# Patient Record
Sex: FEMALE | Race: WHITE
[De-identification: names, ages, dates, MRNs, and addresses within clinical notes are randomized per-mention and may not be internally consistent; named-entity substitution may affect disease eponyms.]

---

## 2021-02-11 NOTE — HPE
HISTORY AND PHYSICAL



DATE OF ADMISSION:  2021



HISTORY OF PRESENT ILLNESS:  This lady is a 24-year-old  4, para 2, last

menstrual period (LMP) May 23, 2020, estimated date of confinement (EDC) 2021 by dating ultrasound at eight weeks, two days.  Her past history was a

previous  section times two.  Initial section was at 39 weeks because

of a breech presentation and the second was an elective repeat 

section.  She came in several days ago with acute itching. liver enzymes were 
performed

and she was given ursodiol for the severe itching and her  section was

moved up to 2021.  She received a Beta 12 mg 02/10/2021 and

received her second dose on 2021.



PAST MEDICAL HISTORY:

1.  2012 at 38 weeks, primary  section, breech presentation, 8

pounds 5 ounces; infant required hip braces for four months.

2.  2017 at 39 weeks, elective repeat  section, 7 pounds 11

ounces.

3.  Several years later, had a spontaneous  at 6 weeks, which was

confirmed.  



PHYSICAL EXAMINATION:   

The rest of the examination is unremarkable.  She is normocephalic, atraumatic.

 Neck full range of motion.  Pupils equal and reactive to light.  Distal pulses

are symmetric.  No evidence of deep venous thrombosis (DVT), pulmonary embolism

(PE) or superficial phlebitis.  

CHEST:  Clear bilaterally to bases.  No wheezes or rhonchi.  No CVAT. 

ABDOMEN:  Soft.  Four quadrant bowel sounds are noted.  

Symphysis fundus height is 36.5.  

Fetal heart is 145 and regular.  

Her weight today is 198.8 pounds.  She is 5 feet 6 inches.  Her initial body

mass index (BMI) at visit was 25.8.  

Blood pressure 131/65, respirations 18, pulse 72.  



LABORATORY DATA:

She is A positive. Human immunodeficiency virus negative.  Hepatitis negative. 

RPR negative.  Rubella immune.  Varicella nonimmune.  Pap was normal.  Urine

was mixed alfa.  Gonorrhea and chlamydia were negative.  Her one-hour glucose

was 113.  Group B Streptococcus (GBS) is pending.  



We reviewed the risks and benefits of  section including hemorrhage,

infection, perforation, death, reoperation, remote possibility of blood

transfusion, remote possibility of hysterectomy for life-threatening bleeding

issues, remote possibility of fetal laceration and/or baby in the 

intensive care unit (NICU) for respiratory difficulty, despite the fact she has

had two Beta shots already.  Patient expressed understanding of risks and

benefits, has significant itching today.



She had a nonstress test, which is reactive.  All questions were answered, 30

minute discussion.  Patient signed a consent form.  She is scheduled for COVID

testing.   her medications at [place].

[facility]

## 2021-02-12 ENCOUNTER — HOSPITAL ENCOUNTER (INPATIENT)
Dept: HOSPITAL 53 - M LDI | Age: 26
LOS: 2 days | Discharge: HOME | DRG: 771 | End: 2021-02-14
Attending: OBSTETRICS & GYNECOLOGY | Admitting: OBSTETRICS & GYNECOLOGY
Payer: COMMERCIAL

## 2021-02-12 VITALS — WEIGHT: 199.3 LBS | HEIGHT: 66 IN | BODY MASS INDEX: 32.03 KG/M2

## 2021-02-12 VITALS — SYSTOLIC BLOOD PRESSURE: 128 MMHG | DIASTOLIC BLOOD PRESSURE: 82 MMHG

## 2021-02-12 VITALS — DIASTOLIC BLOOD PRESSURE: 70 MMHG | SYSTOLIC BLOOD PRESSURE: 125 MMHG

## 2021-02-12 VITALS — DIASTOLIC BLOOD PRESSURE: 68 MMHG | SYSTOLIC BLOOD PRESSURE: 126 MMHG

## 2021-02-12 VITALS — DIASTOLIC BLOOD PRESSURE: 63 MMHG | SYSTOLIC BLOOD PRESSURE: 115 MMHG

## 2021-02-12 VITALS — DIASTOLIC BLOOD PRESSURE: 56 MMHG | SYSTOLIC BLOOD PRESSURE: 114 MMHG

## 2021-02-12 VITALS — SYSTOLIC BLOOD PRESSURE: 117 MMHG | DIASTOLIC BLOOD PRESSURE: 76 MMHG

## 2021-02-12 VITALS — DIASTOLIC BLOOD PRESSURE: 69 MMHG | SYSTOLIC BLOOD PRESSURE: 117 MMHG

## 2021-02-12 VITALS — DIASTOLIC BLOOD PRESSURE: 64 MMHG | SYSTOLIC BLOOD PRESSURE: 128 MMHG

## 2021-02-12 DIAGNOSIS — Z3A.36: ICD-10-CM

## 2021-02-12 DIAGNOSIS — D64.9: ICD-10-CM

## 2021-02-12 DIAGNOSIS — O34.211: ICD-10-CM

## 2021-02-12 DIAGNOSIS — K83.1: ICD-10-CM

## 2021-02-12 LAB
BASE EXCESS BLDCOA CALC-SCNC: -4.5 MMOL/L
BASE EXCESS BLDCOV CALC-SCNC: -3.3 MMOL/L
CO2 BLDCOA CALC-SCNC: 24.5 MEQ/L
CO2 BLDCOV CALC-SCNC: 23.9 MEQ/L
HCO3 STD BLDCOA-SCNC: 19.3 MEQ/L
HCO3 STD BLDCOA-SCNC: 22.9 MEQ/L
HCO3 STD BLDCOV-SCNC: 21 MEQ/L
HCO3 STD BLDCOV-SCNC: 22.5 MEQ/L
HCT VFR BLD AUTO: 32.1 % (ref 36–47)
HGB BLD-MCNC: 9.4 G/DL (ref 12–15.5)
MCH RBC QN AUTO: 21.8 PG (ref 27–33)
MCHC RBC AUTO-ENTMCNC: 29.3 G/DL (ref 32–36.5)
MCV RBC AUTO: 74.5 FL (ref 80–96)
PCO2 BLDCOA: 51.1 MMHG
PCO2 BLDCOV: 43.3 MMHG
PH BLDCOA: 7.27 UNITS
PH BLDCOV: 7.33 UNITS
PLATELET # BLD AUTO: 276 10^3/UL (ref 150–450)
PO2 BLDCOA: 17.8 MMHG
PO2 BLDCOV: 29.2 MMHG
RBC # BLD AUTO: 4.31 10^6/UL (ref 4–5.4)
SAO2 % BLDCOA: 35.9 %
SAO2 % BLDCOV: 69.1 %
WBC # BLD AUTO: 12.6 10^3/UL (ref 4–10)

## 2021-02-12 RX ADMIN — KETOROLAC TROMETHAMINE SCH MG: 30 INJECTION, SOLUTION INTRAMUSCULAR at 21:13

## 2021-02-12 RX ADMIN — KETOROLAC TROMETHAMINE SCH MG: 30 INJECTION, SOLUTION INTRAMUSCULAR at 15:14

## 2021-02-12 NOTE — RO
OPERATIVE NOTE



DATE OF OPERATION: 2021



This is a 25-year-old  4, para 2 admitted for repeat  section at

36 weeks and 6 days gestation. She has had two previous  sections and

she has cholestasis of pregnancy which is significant in that she has elevated

liver enzymes and bile salts.



PREOPERATIVE DIAGNOSIS: Repeat  section x3, cholestasis of pregnancy.



POSTOPERATIVE DIAGNOSIS: Repeat  section x3, cholestasis of pregnancy.



OPERATION PROPOSED: Repeat  section.



PROCEDURE PERFORMED: Repeat  section.



SURGEON: Wil Lindsay MD



ASSISTANT: Dr. Hsu for extraction, retraction and visualization without

which the procedure could not be completed.



ANESTHESIA: Spinal plus local anesthetic for intraperitoneal procedures.



ESTIMATED BLOOD LOSS: 400 mL. 



DESCRIPTION OF PROCEDURE: After adequate time out prepped and draped in supine

position, Davenport catheter in the bladder draining clear urine, Acetaminophen

suppository 1300 mg per rectum, sequentials in place, appropriate antibiotics

preoperatively, a Pfannenstiel incision was made through the two previous

Pfannenstiel incisions, passing through abdominal layers, securing hemostasis,

opening the peritoneal cavity. We noticed that this lady had a huge varicosity

on her left side and had a very thin lower uterine segment. A Mobius was placed

in the abdomen. The bladder flap was developed. Small transverse incision into

the uterus was made, ARM, draining clear lochia. We delivered a livebirth

female infant, 7 pounds 0 ounces, 3170 gm, Apgars of 8 and 9 at 1 and 5 minutes

respectively, cord x1 around the neck and around the ankle. Arterial pH 7.27,

base excess -4.5, venous pH 7.33, base excess -3.3. Placenta was manually

removed, three vessels of the cord, membranes and tissues intact. The uterus

contracted well down under Pitocin. The lower segment was oversewn in usual

fashion in two layers and reperitonealization was performed. With instrument

and pad count correct we viewed the left side. She still had the varicosities,

we did remove the Mobius and evaluated the left side, seemed to be stable. We

then closed the abdomen with running stitch for peritoneum, same for the

fascia, irrigation to subcu, subcuticular stitch was placed. Marcaine 0.25% 10

mL to the incision site. The Mepore dressing was placed. The patient was sent

to recovery in good condition.

## 2021-02-12 NOTE — CCD
Summarization Of Episode

                             Created on: 2021



MR ODILIA SANTIAGO

External Reference #: [id number]

: 1995

Sex: Female



Demographics





                          Address                   [address]  [address]

 

                          Home Phone                [phone]

 

                          Preferred Language        English

 

                          Marital Status            

 

                          Orthodoxy Affiliation     Mormon

 

                          Race                      White

 

                          Ethnic Group              Not  or 





Author





                          Author                    [facility]

 

                          Organization              [facility]

 

                          Address                   Unknown

 

                          Phone                     Unavailable







Support





                Name            Relationship    Address         Phone

 

                    OLNAKUL              Next Of Kin         [address], [place]  [address] [phone]

 

                    PAMELA  GERHARD    Next Of Kin         [address]  [address] [phone]

 

                UE              Next Of Kin     Unknown         Unavailable

 

                    PAMELA  ADRIAN      Next Of Kin         [address]  [address] [phone]







Care Team Providers





                    Care Team Member Name Role                Phone

 

                    Li,  Zhenbo PA      Unavailable         Unavailable

 

                    Li,  Zhenbo PA      Unavailable         Unavailable

 

                    Li,  Zhenbo PA      Unavailable         Unavailable

 

                    Li,  Zhenbo PA      Unavailable         Unavailable

 

                    Swatsworth, A Dwayne PA Unavailable         Unavailable

 

                    Swatsworth, A Dwayne PA Unavailable         Unavailable

 

                    Swatsworth, A Dwayne PA Unavailable         Unavailable

 

                    Swatsworth, A Dwayne PA Unavailable         Unavailable

 

                    Swatsworth, A Dwayne PA Unavailable         Unavailable

 

                    Swatsworth, A Dwayne PA Unavailable         Unavailable

 

                    Swatsworth, A Dwayne PA Unavailable         Unavailable

 

                    Swatsworth, A Dwayne PA Unavailable         Unavailable

 

                    Swatsworth, A Dwayne PA Unavailable         Unavailable

 

                    Swatsworth, A Dwayne PA Unavailable         Unavailable

 

                    Swatsworth, A Dwayne PA Unavailable         Unavailable

 

                    Swatsworth, A Dwayne PA Unavailable         Unavailable

 

                    Swatsworth, A Dwayne PA Unavailable         Unavailable

 

                    Swatsworth, A Dwayne PA Unavailable         Unavailable

 

                    WALESKA CRAWFORD MD Unavailable         Unavailable

 

                    WALESKA CRAWFORD MD Unavailable         Unavailable

 

                    WALESKA CRAWFORD MD Unavailable         Unavailable

 

                    WALESKA CRAWFORD MD Unavailable         Unavailable

 

                    WALESKA CRAWFORD MD Unavailable         Unavailable

 

                    WALESKA CRAWFORD MD Unavailable         Unavailable

 

                    WALESKA CRAWFORD MD Unavailable         Unavailable

 

                    WALESKA CRAWFORD MD Unavailable         Unavailable

 

                    WALESKA CRAWFORD MD Unavailable         Unavailable

 

                    WALESKA CRAWFORD MD Unavailable         Unavailable

 

                    WALESKA CRAWFORD MD Unavailable         Unavailable

 

                    WALESKA CRAWFORD MD Unavailable         Unavailable

 

                    TY F MICHAEL STAUFFER Unavailable         Unavailable

 

                    TYWALESKA MD Unavailable         Unavailable

 

                    TY F MICHAEL STAUFFER Unavailable         Unavailable

 

                    TY, F MICHAEL STAUFFER Unavailable         Unavailable

 

                    TY, F MICHAEL STAUFFER Unavailable         Unavailable

 

                    TY, F MICHAEL STAUFFER Unavailable         Unavailable

 

                    TY, F MICHAEL STAUFFER Unavailable         Unavailable

 

                    TY, F MICHAEL STAUFFER Unavailable         Unavailable

 

                    TY, F MICHAEL STAUFFER Unavailable         Unavailable

 

                    TY, F MICAHEL STAUFFER Unavailable         Unavailable

 

                    TY, F MICHAEL STAUFFER Unavailable         Unavailable

 

                    TY, F MICHAEL STAUFFER Unavailable         Unavailable

 

                    TY, F MICHAEL STAUFFER Unavailable         Unavailable

 

                    TY, F MICHAEL STAUFFER Unavailable         Unavailable

 

                    TY, F MICHAEL STAUFFER Unavailable         Unavailable

 

                    [facility], [facility] Unavailable         Unavailable

 

                    TURRIN,  AV   Unavailable         Unavailable

 

                    TURRIN,  AV   Unavailable         Unavailable

 

                    TURRIN,  [facility]   Unavailable         Unavailable

 

                    TURRIN,  AV   Unavailable         Unavailable

 

                    Ezequiel, J David PA-C Unavailable         Unavailable

 

                    Ezequiel, J David PA-C Unavailable         Unavailable

 

                    Ezequiel, J David PA-C Unavailable         Unavailable

 

                    Ezequiel, J David PA-C Unavailable         Unavailable

 

                    Ezequiel, J David PA-C Unavailable         Unavailable

 

                    Ezequiel, J David PA-C Unavailable         Unavailable

 

                    Ezequiel, J David PA-C Unavailable         Unavailable

 

                    Ezequiel, J David PA-C Unavailable         Unavailable

 

                    Ezequiel, J David PA-C Unavailable         Unavailable

 

                    Ezequiel, J David PA-C Unavailable         Unavailable

 

                    Ezequiel, J Advid PA-C Unavailable         Unavailable

 

                    MARQUES FAIR MD  Unavailable         Unavailable

 

                    MARQUES FAIR MD  Unavailable         Unavailable

 

                    MARQUES FAIR MD  Unavailable         Unavailable

 

                    MARQUES FAIR MD  Unavailable         Unavailable

 

                    MARQUES FAIR MD  Unavailable         Unavailable

 

                    MARQUES FAIR MD  Unavailable         Unavailable

 

                    MARQUES FAIR MD  Unavailable         Unavailable

 

                    MARQUES FAIR MD  Unavailable         Unavailable

 

                    MARQUES FAIR MD  Unavailable         Unavailable

 

                    MARQUES FAIR MD  Unavailable         Unavailable

 

                    MARQUES FAIR MD  Unavailable         Unavailable

 

                    MARQUES FAIR MD  Unavailable         Unavailable

 

                    MARQUES FAIR MD  Unavailable         Unavailable

 

                    MARQUES FAIR MD  Unavailable         Unavailable

 

                    MARQUES FAIR MD  Unavailable         Unavailable

 

                    MARQUES FAIR MD  Unavailable         Unavailable

 

                    MARQUES FAIR MD  Unavailable         Unavailable

 

                    MARQUES FAIR MD  Unavailable         Unavailable

 

                    MARQUES FAIR MD  Unavailable         Unavailable

 

                    MARQUES FAIR MD  Unavailable         Unavailable

 

                    MARQUES FAIR MD  Unavailable         Unavailable

 

                    MARQUES FAIR MD  Unavailable         Unavailable

 

                    MARQUES FAIR MD  Unavailable         Unavailable

 

                    VENERUS, POPPY DOVER MD Unavailable         Unavailable

 

                    VENERUS, POPPY DOVER MD Unavailable         Unavailable

 

                    VENERUS, POPPY DOVER MD Unavailable         Unavailable

 

                    VENERUS, POPPY DOVER MD Unavailable         Unavailable

 

                    VENERUS, POPPY DOVER MD Unavailable         Unavailable

 

                    VENERUS, POPPY DOVER MD Unavailable         Unavailable

 

                    VENERUS, POPPY DOVER MD Unavailable         Unavailable

 

                    VENERUS, POPPY DOVER MD Unavailable         Unavailable

 

                    VENERUS, POPPY DOVER MD Unavailable         Unavailable



                                  



Re-disclosure Warning

          The records that you are about to access may contain information from 
federally-assisted alcohol or drug abuse programs. If such information is 
present, then the following federally mandated warning applies: This information
has been disclosed to you from records protected by federal confidentiality 
rules (42 CFR part 2). The federal rules prohibit you from making any further 
disclosure of this information unless further disclosure is expressly permitted 
by the written consent of the person to whom it pertains or as otherwise 
permitted by 42 CFR part 2. A general authorization for the release of medical 
or other information is NOT sufficient for this purpose. The Federal rules 
restrict any use of the information to criminally investigate or prosecute any 
alcohol or drug abuse patient.The records that you are about to access may 
contain highly sensitive health information, the redisclosure of which is 
protected by Article 27-F of the [facility] Public Health law. If you 
continue you may have access to information: Regarding HIV / AIDS; Provided by 
facilities licensed or operated by the [facility] Office of Mental Health; 
or Provided by the [facility] Office for People With Developmental 
Disabilities. If such information is present, then the following [place] 
mandated warning applies: This information has been disclosed to you from 
confidential records which are protected by state law. State law prohibits you 
from making any further disclosure of this information without the specific 
written consent of the person to whom it pertains, or as otherwise permitted by 
law. Any unauthorized further disclosure in violation of state law may result in
a fine or prison sentence or both. A general authorization for the release of 
medical or other information is NOT sufficient authorization for further disc
losure.                                                                         
    



Allergies and Adverse Reactions

          



           Type       Description Substance  Reaction   Status     Data Source(s

)

 

                      No Known Allergies No Known Allergies                     

  [facility]



                                                                                
       



Encounters

          



           Encounter  Providers  Location   Date       Indications Data Source(s

)

 

                Outpatient      Attender: MICHAEL CRAWFORD MDConsultant: CARINA NEGRON

NKNOWBRIDGETTE                 2021 

07:27:00 PM EST - 2021 09:59:00 PM [facility]

 

                                        Patient discharged. 

 

                Emergency       Attender: David Nieves PA-C                 2020 02:08:00 PM EDT - 2020

05:45:00 PM EDT                                     [facility]

 

                                        Patient discharged. 

 

                Inpatient       Attender: Donna Morrison PAAttender: AV DAVIS 

                2020 01:45:00 

PM EDT - 2020 02:00:00 PM EDT                           [facility]

ital

 

                                        Patient discharged. 

 

                Emergency       Attender: KALEN BAXTER MD                  08:12:00 AM EDT - 2020 

12:59:00 PM EDT                                     [facility]

 

                                        Patient discharged. 

 

                Emergency       Attender: AV DAVIS                 2020 09:16:00 PM EDT - 2020 

01:07:00 AM EDT                                     [facility]

 

                                        Patient discharged. 

 

                Emergency       Attender: AV DAVIS                 2020 06:58:00 PM EDT - 2020 

10:04:00 PM EDT                                     [facility]

 

                                        Patient discharged. 

 

                Emergency       Attender: CAITY FAIR MD                 2020 06:07:00 PM EDT - 2020 

09:28:00 PM EDT                                     [facility]

 

                                        Patient discharged. 

 

                Emergency       Attender: Dwayne STAPLES                 2020 09:32:00 PM EDT - 2020

12:11:00 AM EDT                                     [facility]

 

                                        Patient discharged. 



                                                                                
                                                                             



Medications

          



          Medication Brand Name Start Date Product Form Dose      Route     Admi

nistrative 

Instructions Pharmacy Instructions Status     Indications Reaction   Description

 Data 

Source(s)

 

          400 mg (241.3 mg magnesium)           2020 12:00:00 AM EDT table

t    40                  TAKE ONE 

TABLET BY MOUTH TWICE A DAY TAKE ONE TABLET BY MOUTH TWICE A DAY SOLD: 

2020                                                      [facility]

 

                    Meclizine Hydrochloride 25 MG Oral Tablet MECLIZINE HCL     

  2020 12:00:00 AM 

EDT          tablet       30                        TAKE ONE TABLET BY MOUTH TWI

CE A DAY TAKE ONE TABLET BY MOUTH 

TWICE A DAY  SOLD: 2020                                        [facility]

s



                                                                              



Insurance Providers

          



             Payer name   Policy type / Coverage type Policy ID    Covered party

 ID Covered 

party's relationship to pierson Policy Pierson             Plan Information

 

          [facility]           [id number]           [facility]        

         [id number]

 

          [facility] - O/[facility]           [id number]           01          

        [id number]

 

          [facility] - I/P           [id number]           01          

        [id number]

 

          [facility] - PHYSICIAN           [id number]           01    

              [id number]



                                                                                
                                     



Problems, Conditions, and Diagnoses

          



           Code       Display Name Description Problem Type Effective Dates Data

 Source(s)

 

             Z3A35        35 weeks gestation of pregnancy 35 weeks gestation of 

pregnancy Diagnosis    

2021 07:27:00 PM [facility]

 

                    [id number]              Maternal care for low transverse scar fr

om previous  delivery 

Maternal care for low transverse scar from previous  delivery Diagnosis 

                

2021 07:27:00 PM [facility]

 

             Z3A09        9 weeks gestation of pregnancy 9 weeks gestation of pr

egnancy Diagnosis    

2020 02:08:00 PM EDT              [facility]

 

                    O211                Hyperemesis gravidarum with metabolic di

sturbance Hyperemesis gravidarum 

with metabolic disturbance Diagnosis           2020 02:08:00 PM EDT [facility]

 

                O219            Vomiting of pregnancy, unspecified Vomiting of p

regnancy, unspecified 

Diagnosis                 2020 02:08:00 PM EDT [facility]

 

                          [id number]                    Endocrine, nutritional and m

etabolic diseases complicating pregnancy, 

first trimester                         Endocrine, nutritional and metabolic dis

eases complicating 

pregnancy, first trimester Diagnosis           2020 01:45:00 PM EDT [facility]

 

                 Hypomagnesemia Hypomagnesemia Diagnosis  2020 01:45:

00 PM EDT 

[facility]

 

           E860       Dehydration Dehydration Diagnosis  2020 01:45:00 PM 

EDT [facility]

 

           R824       Acetonuria Acetonuria Diagnosis  2020 01:45:00 PM ED

T [facility]

 

             Z3A08        8 weeks gestation of pregnancy 8 weeks gestation of pr

egnancy Diagnosis    

2020 01:45:00 PM EDT              [facility]

 

             E869         Volume depletion, unspecified Volume depletion, unspec

ified Diagnosis    

2020 08:12:00 AM EDT              [facility]

 

                    M48685              Other specified pregnancy related condit

ions, first trimester Other 

specified pregnancy related conditions, first trimester Diagnosis               

  2020 

08:12:00 AM EDT                         [facility]

 

                    Z3A01               Less than 8 weeks gestation of pregnancy

 Less than 8 weeks gestation of 

pregnancy           Diagnosis           2020 06:58:00 PM EDT [facility]

 

           E876       Hypokalemia Hypokalemia Diagnosis  2020 06:07:00 PM 

EDT [facility]

 

             R102         Pelvic and perineal pain Pelvic and perineal pain Diag

nosis    2020 

09:32:00 PM [facility]



                                                                                
                                                                                
                                                                            



Surgeries/Procedures

          



             Procedure    Description  Date         Indications  Data Source(s)

 

                                        Introduction of Electrolytic and Water B

alance Substance into Peripheral Vein, 

Percutaneous Approach                   Introduction of Electrolytic and Water B

alance Substance 

into Peripheral Vein, Percutaneous Approach 2020 12:00:00 AM [facility]



                                                                                
       



Results

          



                    ID                  Date                Data Source

 

                    [id number]     2021 02:38:00 PM EST [facility]









          Name      Value     Range     Interpretation Code Description Data Karlie

rce(s) Supporting 

Document(s)

 

          CULTURE URINE                                         [facility]

spital  

 

                                            _CULTURE 

URINE_$$[id number]$$[id number]$$584260$$449134$$501055$$683480$$597247$$290250$$917765$$

[id number]$$010420$$916695$$144729$$813866$$114536$$655814$$591118$$535551$$395349$$

[id number]$$591810$$322687$$849424$$714833$$942798$$455381$$991083                  
      -- Continued on next page --Patient: PAMELA MCKEON                Order:
[id number]                   Page 2Culture: CULTURE URINE                            
              Status: 
Final===========================================================================

====                         -- Continued on next page --Patient: PAMELA MCKEON                Order: [id number]                   Page 2Culture: CULTURE URINE    
                                     Status: 
Prelim==========================================================================

=====                         -- Continued on next page --Patient: PAMELA MCKEON                Order: [id number]                   Page 2Culture: CULTURE URINE    
                                     Status: 
Prelim==========================================================================

=====$$[id number]$$[id number] DATE/TIME: 2021 14:07Culture: CULTURE URINE 
                                         Status: FinalUrine 
Culture,Comprehensive:                                                  P1No 
growth in 36 - 48 hours.    **** Previous result entered on 2021 13:31 ET 
****Specimen has been received and testing has been initiated.    **** Previous 
result entered on 2021 04:45 ET ****Specimen has been received and testing
has been initiated.P1 Test performed by: [facility] #: [id number]                      [address] [place] [address]                      
[id number]                      [facility] [id number] Director
 :   Reyes Araceli B MD                       NPI #:      :         
                                                                          
[email or web address] REF                                                      
                             [id number].SENT REF                         
                                                          [id number].SENT
REF 









                    ID                  Date                Data Source

 

                    [id number]     2021 07:55:00 PM EST [facility]









          Name      Value     Range     Interpretation Code Description Data Karlie

rce(s) Supporting 

Document(s)

 

          URINALYSIS                                         [facility]

te  

 

                                            URINALYSIS 

 

          SOURCE    R                                       [facility]

al  

 

          COLOR     yellow    NORMAL: Yellow                     [facility]

ospital  

 

          CLARITY   clear     NORMAL: Clear                     [facility]

spital  

 

           Specific gravity of Urine by Test strip 1.020      1.001 - 1.030     

                  [facility]                                 

 

          pH        6         5 - 9                         [facility]

al  

 

           Glucose [Mass/volume] in Urine by Test strip NORM       NORMAL: Negat

[facility]                            

 

           Bilirubin.total [Presence] in Urine by Test strip NEG        NORMAL: 

Negative                       

[facility]                   

 

           Ketones [Presence] in Urine by Test strip 15         NORMAL: Negative

 A                     [facility]                                 

 

           Protein [Mass/volume] in Urine by Test strip NEG        NORMAL: Negat

[facility]                            

 

           Nitrite [Presence] in Urine by Test strip NEG        NORMAL: Negative

                       [facility]                                

 

          BLOOD     NEG       NORMAL: Negative                     [facility]  

 

           Leukocyte esterase [Presence] in Urine by Test strip NEG        CAITY

L: Negative                       

[facility]                   

 

           Urobilinogen [Mass/volume] in Urine by Test strip NOR        less radha

n 1.0 mg/dL                       

[facility]                   

 

          MICROSCOPIC Not Indicate                               [facility]

ospital  









                    ID                  Date                Data Source

 

                    [id number]     2020 11:32:00 AM EST [facility]









          Name      Value     Range     Interpretation Code Description Data Karlie

rce(s) Supporting 

Document(s)

 

          SARS-COV-2 RNA RESP QL ELENA+PROBE                                      

   [facility]     

 

                                        This lab was ordered by [facility] 6434 and rep

orted by [facility]. 









                    ID                  Date                Data Source

 

                    [id number]     2020 09:53:00 AM EDT [facility] [address] [place] [address] PHONE: [phone]

 FAX: [phone]  Name .................. : PAMELA MCKEON              Acct 
Number.................. : [id number] ROOM. ................. : [place]             
            Number ................... : [id number] Stay type ............. : E/R 
                         Discharge Date......... ... : 20 Admit Date 
......... : 20                        Admit Phys .................... : 
EZEQUIEL LIANNA Date of Birth ....... : 1995                     Family Phys 
................... : UNKNOWN  Phone .................. : [phone]        
        Age ................................ : 24 Film# .................. 
.:[id number]                      Sex ................................. : F  
Unsigned transcriptions are preliminary reports and do not represent a medical 
or legal document         OB 1ST TRI UP TO 14 WEEKS 05263        
COMPLETE:20 15:12 KNB [id number]                              Reason(s): lower 
abd pain, vomiting     OBSTETRICAL ULTRASOUND:  HISTORY: Lower abdominal pain 
and vomiting.  FINDINGS: The uterus measures 9.3 x 7.6 x 6.2 cm.  The right 
ovary measures 2.7 x 2.9 x 3.0 cm.  The left ovary measures 2.3 x 1.9 x 1.7 cm. 
 Within the endometrial cavity, there is a single live intrauterine gestation 
with a crown rump length measuring 28 mm, which corresponds to 9 weeks 5 days 
plus/minus 1 week. EDC is 3/4/21. Fetal heart rate is 172 beats per minute.  
IMPRESSION: Single live intrauterine gestation at 9 weeks 5 days. EDC is 3/4/21.
 EDC based on the initial sonogram was 3/7/21. This indicates appropriate 
interval growth. Fetal heart rate is 172 beats per minute.  Examination dictated
 by JHOAN Garcias. Examination was reviewed with Arash Artis MD, 
radiologist at the time of this dictation.   ___________________________________
 Electronically Reviewed and Signed By Arash Artis MD                 , 
20 09:53, AML  Transcribe Initials: DZ , Transcribe Date: 20 00:34, 
Dictation Date:   Copy for: EZEQUIEL MCWILLIAMS              via fax Copy for: 
EMERGENCY DEPT                via modem                                         
                                                      Page 1 of 2 [facility] [address] [place] [address] PHONE: [phone] FAX: 
[phone]  Name .................. : PAMELA MCKEON              Acct 
Number.................. : [id number] ROOM. ................. : [place]             
           MR Number ................... : [id number] Stay type ............. : E/R 
                         Discharge Date......... ... : 20 Admit Date 
......... : 20                        Admit Phys .................... : 
EZEQUIEL LIANNA Date of Birth ....... : 1995                     Family Phys 
................... : UNKNOWN  Phone .................. : [phone]        
        Age ................................ : 24 Film# .................. 
.:[id number]                      Sex ................................. : F  
Unsigned transcriptions are preliminary reports and do not represent a medical 
or legal document        US OB 1ST TRI UP TO 14 WEEKS 91458        
COMPLETE:20 15:12 KNB [id number]                              Reason(s): lower 
abd pain, vomiting   Copy for: 710 MED REC DISCHARGED                           
                                                                       Page 2 of
 2   









          Name      Value     Range     Interpretation Code Description Data Karlie

rce(s) Supporting 

Document(s)

 

                                                                       









                    ID                  Date                Data Source

 

                    [id number]      2020 02:08:00 PM EDT [facility]

 

                                                                                

                                        

                1                                           OrderSheet          
                           [facility]                               
      Emergency Department                               [address] [place] [address]                                 Phone #: [phone] ext- [phone]                                         2020 13:54                   
    ----------------------------------------------------                        
           Patient: ODILIA SANTIAGO                                 MRN: [id number]
      Acct#: [id number]                               Sex: F : 1995 Age: 
24yWEIGHT:67.5 kg (S) HEIGHT:66 inches (S) BMI:24.0ALLERGIES: No Known Drug 
AllergyCHIEF COMPLAINT: vomitingDIAGNOSIS: Hyperemesis gravidarum, Urinary tract
 infectious diseaseLAB ORDERSOrder Description        Priority      Entered     
           Acknowledged     InitialedBeta-HCG, Quant          STAT          
14:43 2020                        14:44 Maryam,Serum                     
            David STAPLES;                       Kiran REYESCBC w Diff        
       STAT          14:43 2020                        14:44 David Francisco;                       Kiran REYESCMP                      STAT          14:43 2020                     
   14:44 David Francisco;        
               Kiran REYESLactic Acid              STAT          14:43 2020
                        14:44 David Francisco;                       Kiran REYESLipase                   STAT 
         14:43 2020                        14:44 David Francisco;                       Kiran REYESUrinalysis (Clean        STAT          14:43 2020                     
   16:45 Piotr,Catch)                                David STAPLES;          
             Kena REYESCulture, Urine           STAT          17:23 2020
                        18:21 Maryam,(Urine, Clean                         
David STAPLES;                       Kiran REYESCatch)DIAGNOSTIC STUDY 
ORDERSOrder Description  Priority     Entered                Acknowledged       
     InitialedUS OB 1ST TRI UP   STAT         14:43 2020                  
             14:44 Maryam,TO 14 WEEKS                    David STAPLES;     
                         Kiran REYES(Oxygen?(No))(IV?(Yes))                  
Reason for Study: lower abd pain, vomitingMEDICATION/IV/DRIP/FLUID ORDERSOrder 
Description    Priority          Entered                Acknowledged     
InitialedNS IV : Bolus 1000                     14:43 2020                
        15:13 Maryam,mL, then 125 mL/hr                    David STAPLES;   
                    Kiran REYES(NOW x1)Reglan 10 mg IVP                       
14:43 2020                        15:03 Maryam,X1 dose: 10 mg            
            David STAPLES;                       Kiran REYES                  
                                                                               2
                                           OrderSheet                           
          [facility]                                     Emergency 
Department                               [address] [place] [address]   
                              Phone #: [phone] ext- [phone]                 
                        2020 13:54                       
----------------------------------------------------                            
       Patient: ODILIA SANTIAGO                                 MRN: [id number]    
  Acct#: [id number]                               Sex: F : 1995 Age: 
24y(NOW x1)GENERAL ORDERSOrder Description        Priority      Entered         
       Acknowledged     InitialedNPO                                    14:43 
2020                        14:44 David Francisco;                       Kiran REYESWarm blanket            
               14:43 2020                        14:44 David Francisco;                       Kiran REYESVitals                                 14:43 2020                     
   14:44 David Francisco;        
               Kiran REYES[Electronically signed by Kiran Francisco R.N. (18:21 
2020)][Electronically signed by David Nieves (18:27 
2020)][Electronically locked by Kiran Francisco R.N. (18:21 2020)]  









          Name      Value     Range     Interpretation Code Description Data Karlie

rce(s) Supporting 

Document(s)

 

                                                                       









                    ID                  Date                Data Source

 

                    [id number]      2020 02:08:00 PM EDT [facility]

 

                                                                                

                                        

               1                            Medication Reconciliation Report    
                              [facility]                            
      Emergency Department                             [address] [place] [address]                               Phone #: [phone] ext- [phone]                                       2020 13:54                     
----------------------------------------------------                            
    Patient: ODILIA SANTIAGO                               MRN: [id number]      
Acct#: [id number]                             Sex: F : 1995 Age: 
24yWeight:      67.5 kgHeight/Length:      66 in.BMI:         24.0ALLERGIES: No 
Known Drug AllergyThe patient's Home Medications are listed below:CONTINUE 
TAKING THE FOLLOWING MEDICATIONS:   Meclizine HCl Oral 25 mg, prnThe source(s) 
of the original Home Medication information:patientThe following Medications 
were given to the patient in the Emergency Department:Reglan [IVP] IVP 10 mg, 
administered: 2020 2:48:00 PMNS [IV] IV Fluids bolus 0, then 1500 mL/hr, 
administered: 2020 3:12:00 PMThe following Medications were prescribed to 
the patient:Macrobid 100 mg capsule Take 1 capsule twice a day as directed for 7
 days -- for UTI. Dispense 14capsule. Refills: 0. Substitution 
permitted.Pharmacy - [facility] [address] ; [place] 
[address]. Phone: [phone] FaxNumber: [phone]. -- JHOAN English  









          Name      Value     Range     Interpretation Code Description Data Karlie

rce(s) Supporting 

Document(s)

 

                                                                       









                    ID                  Date                Data Source

 

                    [id number]      2020 02:08:00 PM EDT [facility]

 

                                                                                

                                        

                 1                              Medication Administration Record
                                      [facility]                    
                  Emergency Department                                [address] [place] [address]                                  Phone #: [phone] ext- [phone]                                          2020 13:54    
                    ----------------------------------------------------        
                            Patient: ODILIA SANTIAGO                           
       MRN: [id number]      Acct#: [id number]                                Sex: F 
: 1995 Age: 24yWeight: 67.5 kgHeight/Length: 66 inBMI:    24ALLERGIES: 
      No Known Drug Allergy      Date/Time                    Medication 
Administered             Medication OrderedStart                         NS [IV]
                              NS IV : Bolus 1000 mL, then 60667:12 2020   
           Dose: IV Fluids                      mL/hr (NOW x1)Kiran Francisco 
RJEREMI      Rate: 1500 mL/hr over 40 minute(s)----                          
Dispensed: 1000 mL bagStop                          Site: #1 right AC16:45 
2020Kena Saldaña R.N.Given                         REGLAN [IVP] 
(METOCLOPRAMIDE         Reglan 10 mg IVP X1 dose: 10 mg14:48 2020         
     HCL)                                 (NOW x1)Kiran Francisco R.N.      
Dose: 10 mg IVP                          Site: #1 right AC  









          Name      Value     Range     Interpretation Code Description Data Karlie

rce(s) Supporting 

Document(s)

 

                                                                       









                    ID                  Date                Data Source

 

                    [id number]      2020 02:08:00 PM EDT [facility]

 

                                                                                

                                        

                                1                                       General 
Instructions                                     [facility]         
                            Emergency Department                               
[address] [place] [address]                                 Phone #: 
[phone] ext- [phone]                                         2020 
13:54                       ----------------------------------------------------
                                   Patient: ODILIA SANTIAGO                    
             MRN: [id number]      Acct#: [id number]                               Sex:
 F : 1995 Age: 24yAcute urinary tract infection with cystitis and 
hematuria. Not associated with indwelling catheter orobstruction.Moderate 
hyperemesis gravidarum less than 21 weeks with dehydration.INSTRUCTIONSNo 
strenuous activity until better. Rest at home for two days. Do not work for two 
days.Drink plenty of fluids for the next 48 hours as needed. Avoid alcohol and 
NSAIDS. NSAIDS includeaspirin, ibuprofen (Advil) and naproxen (Aleve). Avoid 
fatty, fried/greasy, lactose-containing (such as milk,cheese and ice cream), 
salty and spicy foods until better. No alcohol. Do not smoke.Warnings: Further 
evaluation is necessary. It is very important to follow up with a healthcare 
provider.GENERAL WARNINGS: Return or contact your physician immediately if your 
condition worsens orchanges unexpectedly, if not improving as expected, or if 
other problems arise. SPECIFICALLY, return ifyou develop pain in the abdomen, 
back or shoulder, fever, vomiting, the inability to keep fluids down, bloodin 
vomitus, blood in diarrhea, fainting, lightheadedness or vaginal bleeding.Your 
Current Medications: Your current home medications have been reviewed.CONTINUE 
TAKING THE FOLLOWING MEDICATIONS:Meclizine HCl Oral : 25 mg, prn.Prescription 
Medications:Macrobid 100 mg capsule Take 1 capsule twice a day as directed for 7
 days -- for UTI. Dispense 14capsule. Refills: 0. Substitution 
permitted.Pharmacy - [facility] - [address] ; [place] [address]. Phone: [phone] FaxNumber: [phone].Follow-up:Follow up with 
your healthcare provider in three days even if well. Call for the next available
 appointment.Reason for referral: evaluation. Summary of care provided to 
patient and follow-up provider via paper.Understanding of the discharge 
instructions verbalized by patient. Expected course of illness, 
dischargeinstructions, activity level, prescriptions x1, follow-up appointment a
nd risks and benefits of treatmentreviewed with patient and understanding 
verbalized. Agrees to plan of care.                                    
ADDITIONAL INFORMATION                                                          
                                                 2                              
      General Instructions                                   [facility]                                   Emergency Department                 
            [address] [place] [address]                               
Phone #: [phone] ext- [phone]                                       
2020 13:54                     
----------------------------------------------------                            
     Patient: ODILIA SANTIAGO                               MRN: [id number]      
ct#: [id number]                             Sex: F : 1995 Age: 24yBladder
 Infection, Female (Adult)Urine is normally doesn't have any bacteria in it. But
 bacteria can get into the urinary tract from theskin around the rectum. Or they
 can travel in the blood from elsewhere in the body. Once they are inyour 
urinary tract, they can cause infection in the urethra (urethritis), the bladder
 (cystitis), or thekidneys (pyelonephritis).The most common place for an 
infection is in the bladder. This is called a bladder infection. This isone of 
the most common infections in women. Most bladder infections are easily treated.
 They arenot serious unless the infection spreads to the kidney.The phrases 
"bladder infection," "UTI," and "cystitis" are often used to describe the same 
thing. Butthey are not always the same. Cystitis is an inflammation of the 
bladder. The most common cause ofcystitis is an infection.SymptomsThe infection 
causes inflammation in the urethra and bladder. This causes many of the 
symptoms.The most common symptoms of a bladder infection are:    Pain or burning
 when urinating    Having to urinate more often than usual    Urgent need to 
urinate    Only a small amount of urine comes out                               
                                                                        3       
                             General Instructions                               
      [facility]                                     Emergency 
Department                               [address] [place] [address]   
                              Phone #: [phone] ext- [phone]                 
                        2020 13:54                       
----------------------------------------------------                            
       Patient: ODILIA SANTIAGO                                 MRN: [id number]    
  Acct#: [id number]                               Sex: F : 1995 Age: 24y 
  Blood in urine   Abdominal discomfort. This is usually in the lower abdomen 
above the pubic bone.   Cloudy urine   Strong- or bad-smelling urine   Unable to
 urinate (urinary retention)   Unable to hold urine in (urinary incontinence)   
Fever   Loss of appetite   Confusion (in older adults)CausesBladder infections 
are not contagious. You can't get one from someone else, from a toilet seat, 
orfrom sharing a bath.The most common cause of bladder infections is bacteria 
from the bowels. The bacteria get onto theskin around the opening of the 
urethra. From there, they can get into the urine and travel up to thebladder, 
causing inflammation and infection. This usually happens because of:   Wiping 
improperly after urinating. Always wipe from front to back.   Bowel incontinence
   Pregnancy   Procedures such as having a catheter inserted   Older age   Not 
emptying your bladder. This can allow bacteria a chance to grow in your urine.  
 Dehydration   Constipation   Sex   Use of a diaphragm for birth 
controlTreatmentBladder infections are diagnosed by a urine test. They are 
treated with antibiotics and usually clear up                                   
                                                                           4    
                                 General Instructions                           
         [facility]                                    Emergency 
Department                              [address] [place] [address]    
                            Phone #: [phone] ext- [phone]                   
                     2020 13:54                      
----------------------------------------------------                            
      Patient: ODILIA SANTIAGO                                MRN: [id number]      
Acct#: [id number]                              Sex: F : 1995 Age: 
24yquickly without complications. Treatment helps prevent a more serious kidney 
infection.MedicinesMedicines can help in the treatment of a bladder infection:  
  Take antibiotics until they are used up, even if you feel better. It is 
important to finish them to    make sure the infection has cleared.    You can 
use acetaminophen or ibuprofen for pain, fever, or discomfort, unless another   
 medicine was prescribed. If you have chronic liver or kidney disease, talk with
 your    healthcare provider before using these medicines. Also talk with your 
provider if you've ever    had a stomach ulcer or gastrointestinal bleeding, or 
are taking blood-thinner medicines.    If you are given phenazopydridine to 
reduce burning with urination, it will cause your urine to    become a bright 
orange color. This can stain clothing.Care and preventionThese self-care steps 
can help prevent future infections:    Drink plenty of fluids to prevent 
dehydration and flush out your bladder. Do this unless you    must restrict 
fluids for other health reasons, or your doctor told you not to.    Proper 
cleaning after going to the bathroom is important. Wipe from front to back after
 using    the toilet to prevent the spread of bacteria.    Urinate more often. 
Don't try to hold urine in for a long time.    Wear loose-fitting clothes and 
cotton underwear. Avoid tight-fitting pants.    Improve your diet and prevent 
constipation. Eat more fresh fruit and vegetables, and fiber,    and less junk 
and fatty foods.    Avoid sex until your symptoms are gone.    Avoid caffeine, 
alcohol, and spicy foods. These can irritate your bladder.    Urinate right 
after intercourse to flush out your bladder.    If you use birth control pills 
and have frequent bladder infections, discuss it with your doctor.Follow-up 
careCall your healthcare provider if all symptoms are not gone after 3 days of 
treatment. This is especiallyimportant if you have repeat infections.If a 
culture was done, you will be told if your treatment needs to be changed. If 
directed, you can                                                               
                                                                                
5                                                   General Instructions        
                                     [facility]                     
                        Emergency Department                                    
   [address] [place] [address]                                         
Phone #: [phone] ext- [phone]                                               
  2020 13:54                               ----------
------------------------------------------                                      
     Patient: ODILIA SANTIAGO                                         MRN: 
[id number]      Acct#: [id number]                                       Sex: F : 
1995 Age: 24ycall to find out the results.If X-rays were done, you will be
 told if the results will affect your treatment.Call 911Call [phone] if any of the 
following occur:       Trouble breathing       Hard to wake up or confusion     
  Fainting or loss of consciousness       Rapid heart rateWhen to seek medical 
adviceCall your healthcare provider right away if any of these occur:       
Fever of 100.4F (38.0C) or higher, or as directed by your healthcare provider   
    Symptoms are not better by the third day of treatment       Back or belly 
(abdominal) pain that gets worse       Repeated vomiting, or unable to keep 
medicine down       Weakness or dizziness       Vaginal discharge       Pain, 
redness, or swelling in the outer vaginal area (labia) 1510-2439 [employer] [address] [place] [address]. All rights reserved. 
This information is not intended as asubstitute for professional medical care. 
Always follow your healthcare professional's instructions.Hyperemesis 
GravidarumHyperemesis gravidarum is a severe form of morning sickness that can 
affect some women duringpregnancy. It may develop around the 5th week and last 
until the 16th week of pregnancy. In somewomen, it may last longer. Symptoms 
include severe nausea and vomiting. This can lead to problemssuch as weight loss
 and dehydration.It's not clear what causes hyperemesis gravidarum. It may be 
from rising hormone levels early in thepregnancy. It can be a serious threat to 
mother and fetus if symptoms are severe. Follow the advicebelow carefully. If 
your symptoms don't get better with home care measures, you may need to stay 
inthe hospital. In the hospital, you may get IV (intravenous) fluids and 
medicines.                                                                      
                                     6                                      
General Instructions                                   [facility]   
                                Emergency Department                            
 [address] [place] [address]                               Phone #: 
[phone]- [phone]                                       2020 13:54 
                    ----------------------------------------------------        
                         Patient: ODILIA SANTIAGO                              
 MRN: [id number]      Acct#: [id number]                             Sex: F : 
1995 Age: 24yHome careDiet   Keep a log of the foods you eat and how they 
affect your symptoms. Don't eat foods that    trigger your symptoms.    Eat 
small meals often throughout the day rather than 3 large meals. This can help 
keep your    stomach from being empty. An empty stomach can make nausea worse.  
  Choose foods that are high in carbohydrates. Eating foods high in protein may 
also help. Limit    greasy or spicy foods.    Before getting out of bed in the 
morning, try eating crackers or dry toast. This may help settle    your stomach.
    Drink cold, clear liquids. Drinking small amounts of liquids with 
electrolytes, such as sports    drinks, may help as well.Medicine   If needed, 
your healthcare provider may prescribe certain medicines to help ease nausea and
    vomiting. Your provider may suggest vitamin B6 and ginger. Don't try any 
over-the-counter    medicines or home remedies without talking with your 
provider first.Follow-up careFollow up with your healthcare provider, or as 
advised.When to seek medical adviceCall your healthcare provider right away if 
any of these occur:    Signs of dehydration. These include dry mouth, extreme 
thirst, dark urine or little urine output,    dizziness, weakness, or fainting. 
   Vomiting that won't stop    Inability to keep down liquids    Frequent 
diarrhea   Weight loss or no weight gain over a 2-week period    Severe constant
 pain in the lower right abdomen    Fever of 100.4F (38C) or higher, or as 
directed by your healthcare provider                                            
                                                                                
                   7                                                   General 
Instructions                                             [facility] 
                                            Emergency Department                
                       [address] [place] [address]                     
                    Phone #: [phone] ext- [phone]                           
                      2020 13:54                               
----------------------------------------------------                            
               Patient: ODILIA SANTIAGO                                        
 MRN: [id number]      Acct#: [id number]                                       Sex: F 
: 1995 Age: 24y [id number] 10-[address] Media. [address] [place] [address]. All rights reserved. This information is not intended 
as asubstitute for professional medical care. Always follow your healthcare 
professional's instructions.[place] DietYour healthcare provider may recommend a 
bland diet if you have an upset stomach. It consists offoods that are mild and 
easy to digest. It is better to eat small frequent meals rather than 3 
largemeals a day.BeveragesOK: Fruit juices, non-caffeinated teas and coffee, 
non-carbonated watersAvoid: Carbonated beverage, caffeinated tea and coffee, all
 alcoholic beveragesBreadOK: Refined white, wheat or rye bread, chevy or soda 
crackers, Shaunna toast, plain rolls, bagelsAvoid: Whole-grain breadCerealOK: 
Refined cereals: cooked or ready to eatAvoid: Whole-grain cereals and granola, 
or those containing bran, seeds or nutsDesserts                                 
                                                                      8         
                             General Instructions                               
     [facility]                                    Emergency 
Department                              [address] [place] [address]    
                            Phone #: [phone] ext- [phone]                   
                     2020 13:54                      
----------------------------------------------------                            
      Patient: ODILIA SANTIAGO                                MRN: [id number]      
Acct#: [id number]                              Sex: F : 1995 Age: 24yOK: 
Peanut butter and all others except those to "avoid"Avoid: Chocolate, cocoa, 
coconut, popcorn, nuts, seeds, jam, marmaladeFruitsOK: Canned, cooked, frozen or
 fresh fruits without seeds or tough skinAvoid: Olives, skin and seeds of fruit,
 dried fruitMeatsOK: All fresh or preserved meat, fish and fowlAvoid: Any that 
are prepared with those spices to "avoid"Cheese and eggsOK: Eggs, cottage 
cheese, cream cheese, other cheesesAvoid: All cheeses made with those spices to 
"avoid"Potatoes and pastaOK: Potato, rice, macaroni, noodles, spaghettiAvoid: 
NoneSoupsOK: All soups without heavy seasoningAvoid: Soups made with those 
spices to "avoid"VegetablesOK: Canned, cooked, fresh or frozen mildly flavored 
vegetables without seeds, skins or coarse fiberAvoid: Vegetables prepared with 
those spices to "avoid&quot;; skin and seeds of vegetables and those withcoarse 
fiber, broccoli, cabbage, cauliflower, cucumber, green peppers, and 
cornSpicesOK: Salt, lemon and limejuice, vinegar, all extracts, marie, cinnamon, 
thyme, mace, allspice, paprikaAvoid: Chili powder, cloves, pepper, seed spices, 
garlic, gravy pickles, highly seasoned saladdressings                           
                                                                                
                                    9                                           
        General Instructions                                             
[facility]                                             Emergency 
Department                                       [address] [place] [address]                                         Phone #: [phone]- [phone] 
                                                2020 13:54                
               ----------------------------------------------------             
                              Patient: ODILIA SANTIAGO                         
                MRN: [id number]      Acct#: [id number]                                
       Sex: F : 1995 Age: 24y [id number] [employer]. [address] [place], [place] [address]. All rights reserved. This information is 
not intended as asubstitute for professional medical care. Always follow your 
healthcare professional's instructions.Clear Liquid DietClear liquids are any 
liquid that you can see through. They are also very easy to digest. You may 
beput on a clear liquid diet if you are recovering from irritation or infection 
of the stomach or intestinaltract. This diet may also be used before surgery or 
special procedures such as a colonoscopy. Youshould not be on this diet for more
 than 3 days. Below are some clear liquids you can have on thisdiet.Adults and 
children over 2 years oldAdults should drink a total of 2 to 3 quarts of liquid 
per day. It may be easier to drink small frequentservings rather than a few la
rge ones. Liquids can include:      Fruit juices. Strained orange juice or 
lemonade (no pulp); apple, grape, and cranberry juice;      clear fruit drinks  
    Beverages. Sport drinks, sodas, mineral water (plain or flavored), tea, 
black coffee, liquid      gelatin (add twice the recommended amount of water)   
    Soups. Clear broth       Desserts. Plain gelatin, popsicles, fruit juice 
barsChildren under 2 years oldOral rehydration fluids are available at drug 
stores and most grocery stores. You don't need aprescription. 2332-3962 [employer]. [address] [place] [address]. All rights 
reserved. This information is not intended as asubstitute for professional 
medical care. Always follow your healthcare professional's instructions.        
                                                                                
      10                                     General Instructions               
                     [facility]                                    
Emergency Department                              [address] [place] [address]                                Phone #: [phone] ext- [phone]         
                               2020 13:54                      
----------------------------------------------------                            
      Patient: ODILIA SANTIAGO                                MRN: [id number]      
Acct#: [id number]                              Sex: F : 1995 Age: 24yYou 
have been given the following additional information:Bladder Infection, Female 
(Adult)Hyperemesis GravidarumDiet, [place] (Adult)Clear Liquid DietNo strenuous 
activity until better. Rest at home for two days. Do not work for two 
days.(Electronically signed by JHOAN English 2020 18:28)  









          Name      Value     Range     Interpretation Code Description Data Karlie

rce(s) Supporting 

Document(s)

 

                                                                       









                    ID                  Date                Data Source

 

                    [id number]      2020 02:08:00 PM EDT [facility]

 

                                                                                

                                        

                         1                                   Clinical Report - 
Nurses                                    [facility]                
                    Emergency Department                              [address] [place] [address]                                Phone #: [phone]
[phone]                                        2020 13:54          
            ----------------------------------------------------                
                  Patient: ODILIA SANTIAGO                                MRN: 
[id number]      Acct#: [id number]                              Sex: F : 1995 
Age: 24yTRIAGEArrived by private vehicle. Historian: patient.Acuity: LEVEL 
3.Chief Complaint: (nausea,vomiting,dizziness at 9 weeks gestation).Alert. No 
acute distress.This started yesterday. Onset. (11 AM). ( patient seen last week 
here in ED, admitted for 5 days.). Nofever. Last oral intake by patient was this
 morning.Treatment PTA:(attempted meclizine PO PTA.). --14:20 Kena Saldaña R.N.14:20. HR: 90. RR: 16. O2 saturation: 96%. Temp: 98 F. 
Pain level now: 4/10. --14:07 20Kena Saldaña R.N.14:08 20. BP: 
98/40. MAP: 59. --14:08 20 Kena Saldaña R.N.Weight: 67.5 kg stated. 
Height/Length: 66 inches Per Patient. BMI: 24. --14:01 20 Kena Saldaña R. N.MedicationsMeclizine HCl Oral 25 mg, as needed. --14:04 20 Kena Saldaña R.N.AllergiesNo Known Drug Allergy. --14:20 Kena Saldaña R.N.Medication/allergy information source: the patient. --14:20 Kena Saldaña R.N.ADDITIONAL SURGERIES:Csection.Vertigo. --14:05 20 Kena Saldaña R.N.HistoryPAST MEDICAL HX: Immunizations: up-to-date. Currently 
pregnant. Pregnancy confirmed with serumtest and sonogram. Risks and / or 
problems associated with current pregnancy include hyperemesisgravidarum.SOCIAL 
HX: Never smoker. No alcohol use or drug use. The patient was offered HIV testin
g butdecltd and hepatitis C testing but declined. The patient has not traveled
 outside the [place]Infectious disease exposure: No infectious disease exposure. 
The patient was not exposed to C-diff,MRSA, VRE or CRE.                         
                                                                                
        2                                     Clinical Report - Nurses          
                            [facility]                              
        Emergency Department                                [address] [place] [address]                                  Phone #: [phone] ext- [phone]                                          2020 13:54                 
       ----------------------------------------------------                     
               Patient: ODILIA SANTIAGO                                  MRN: 
[id number]      Acct#: [id number]                                Sex: F : 
1995 Age: 24y SELF HARM ASSESSMENT: Self harm assessment was performed. 
The patient answered "no" to the question(s) "Have you recently felt down, 
depressed, or hopeless?", "Do you have thoughts of harming or killing 
yourself?", &quot;Do you have a plan for harming or killing yourself?", "Have 
you recently had thoughts about harming or killing others?", "Do you have any 
dangerous items in your possession?&quot;, "Have you noticed less interest or 
pleasure in doing things?", "Are you here because you tried to hurt yourself?" 
and "Have you ever tried to hurt yourself before today?&quot;. ABUSE ASSESSMENT:
 No report of abuse. NUTRITIONAL RISK ASSESSMENT: The nutritional risk 
assessment revealed no deficiencies. FUNCTIONAL ASSESSMENT: Functional 
assessment: no impairments noted. LEARNING NEEDS ASSESSMENT: The learning needs 
assessment revealed no barriers. FALL RISK ASSESSMENT: Fall risk assessment 
completed. Risk factors identified include dizziness. SKIN INTEGRITY ASSESSMENT:
 Skin integrity risk assessment completed. No skin integrity risk identified. 
--14:07 20 Kena Saldaña R.N.PHYSICAL TSHAAEFZRT33:48 20. Ambulatory
 to room.GENERAL / NEURO / PSYCH: Alert. Oriented X 4. Appears in no acute 
distress.HEENT: Mucous membranes are pink.RESPIRATORY: Respirations not labored.
 Breath sounds within normal limits.CVS: Normal heart rate and rhythm. Capillary
 refill less than 2 seconds.GI / : Abdomen soft and nontender. Bowel sounds 
within normal limits.EXTREMITIES: No lower extremity edema.SKIN: Skin is warm 
and dry. --15:04 20 Kiran Francisco R.N.NURSING PROGRESS NOTES14:47 
2020 Site #1 started via IV in the right antecubital space with an 20g 
angiocath, with aseptictechnique and good blood return; one attempt. Saline lock
 flushed with 10 mL saline. --15:02 Kiran morgan R.N. 14:48 2020 
Reglan (Metoclopramide HCl) IVP 10 mg given over 2 minute(s) via site #1. 
Allergies verified and confirmed 5 rights. IV patency established. IV site 
checked: no pain, redness, or swelling. IV flushed thoroughly pre- and post-
medication administration. IVP given by RN. Information reviewed with patient 
including reason for taking this medication, signs of allergic reaction and 
precautions. Verbalizes understanding. --15:03 20 Kiran Francisco R.N. 14:48
 20. Reassurance given. Reassessment acuity: LEVEL 3. Two patient 
identifiers checked. Call light placed in reach. Bed placed in lowest position. 
Brakes of bed on. Patient ready for evaluation-                                 
                                                                               3
                                    Clinical Report - Nurses                    
                 [facility]                                     
Emergency Department                               [address] [place] [address]                                 Phone #: [phone] ext- [phone]      
                                   2020 13:54                       -----
-----------------------------------------------                                 
  Patient: ODILIA SANTIAGO                                 MRN: [id number]      
Acct#: [id number]                               Sex: F : 1995 Age: 24y [place]
 notified. --15:03 20 Kiran Francisco R.N. 14:49 20. Patient 
transported to [facility] by wheelchair with radiology tech. --15:04 20 
Kiran Francisco R.N. 15:04 20. [facility]. --15:04 20 Kiran Francisco R.N. 15:12 
2020 Started bag #1 1000 mL IV Fluids NS; at 1500 mL/hr over 40 minute(s) 
via site #1 via IV pump. Allergies verified and confirmed 5 rights. IV patency 
established. IV site checked: no pain, redness, or swelling. IV flushed 
thoroughly pre- and post-medication administration. Information reviewed with 
patient including reason for taking this medication, signs of allergic reaction 
and precautions. Verbalizes understanding. --15:13 20 Kiran Francisco R.N. 
16:19 20. Reassurance given. Reassessment acuity: LEVEL 3. Reassessment 
after fluids administered. She reports no complaints and she is calm, resting 
quietly and sleeping. GI / : Denies nausea. SKIN: Skin is warm and dry. Two 
patient identifiers checked. Call light placed in reach. Bed placed in lowest 
position. Brakes of bed on. Patient ready for evaluation- PA notified. --16:29 
20 Kiran Francisco R.N. 16:45 2020 IV Fluids NS via IV site #1 
Discontinued: completed. Total amount infused: 1000 mL. IV patency established. 
IV site checked: no pain, redness, or swelling. IV flushed thoroughly. --16:45 
20 Kena Saldaña R.N.DISPOSITION / DISCHARGE 17:44 2020 Site #1 
removed upon discharge. Catheter intact. Bandage applied. --17:44 20 
Kiran Francisco R.N. Condition at departure: improved and stable. The goals 
identified in the patient's plan of care were met. Fall risk assessment 
completed. No risk factors identified. No learning barriers present. Discharge 
instructions provided and reviewed with the patient. Reviewed warnings. Reviewed
 medication(s) side effects, precautions, dosing and course information. 
Prescription(s) sent electronically to pharmacy. Treatments reviewed. Reviewed 
referral to a primary care physician. Work note given. Patient verbalized 
understanding. Written instructions provided in English. The patient was 
discharged by the physician assistant. She was discharged home and accompanied 
by spouse. She left ambulatory and via private vehicle. Spouse driving. --17:45 
20 Kiran Francisco R.N. 17:43 20. BP: 94/68. MAP: 76. HR: 62. RR: 16. 
O2 saturation: 100%. Temp: 98.5 F. Pain level now: 0/10. --17:45 20 Kiran Francisco R.N. Departure time: 17:45 2020. --17:45 20 Kiran Francisco R.N.                                                                            
   4                                  Clinical Report - Nurses                  
                  [facility]                                    
Emergency Department                              [address] [place] [address]                                Phone #: [phone] ext- [phone]         
                               2020 13:54                      
----------------------------------------------------                            
      Patient: ODILIA SANTIAGO                                MRN: [id number]      
Acct#: [id number]                              Sex: F : 1995 Age: 
24yLocked/Released at 2020 18:21 by Kiran Francisco R.N.  









          Name      Value     Range     Interpretation Code Description Data Karlie

rce(s) Supporting 

Document(s)

 

                                                                       









                    ID                  Date                Data Source

 

                    [id number]      2020 02:08:00 PM EDT [facility]

 

                                                                                

                                        

                           1                          Clinical Report - 
Physicians/Mid Levels                                      [facility]                                      Emergency Department              
                  [address] [place] [address]                          
        Phone #: [phone] ext- [phone]                                       
   2020 13:54                        
----------------------------------------------------                            
        Patient: ODILIA SANTIAGO                                  MRN: [id number]  
    Acct#: [id number]                                Sex: F : 1995 Age: 
24y Time Seen: 14:35 2020. Arrived- By private vehicle. Historian- 
patient. Disposition decision: 17:24 2020.HISTORY OF PRESENT ILLNESS Chief
 Complaint: VOMITING. No recent travel. She has had nausea. She has had moderate
 vomiting. The vomiting has occurred several times. It has been similar to 
previous episodes. No diarrhea, black stools, bloody stools, abdominal pain or 
constipation. No flank pain, history of possible bad food exposure, known cont
act with a sick individual or change in routine. Has not recently been camping 
or on antibiotics. This started just prior to arrival 9 weeks gestation and 
recent admission x 5 days for hyperemisis gravidarum, followed by OB on [place], 
vomited 6x today, lower abdominal pain. No fever. and is still present. It has 
been constant. The illness is described as moderate. (24 year old female who is 
currently 9 weeks pregnant is here today with hyperemesis. She was admitted for 
5 days last week due to similar symptoms. Pt states she has vomited 6 times 
today and nothing is making it better.). Similar symptoms previously. Patient 
has had similar symptoms frequently. Recent medical care: The patient was seen 
recently in the emergency department and hospitalized.REVIEW OF SYSTEMSNo fever,
 muscle aches, difficulty with urination, dark urine or missed periods. No 
abnormal bleeding,irregular periods, headache, dizziness or sore throat. No 
cough, chest pain, difficulty breathing, excessiveurination or skin rash. No 
jaundice, back pain, fainting episodes or blurred vision. Currently pregnant: 
9weeks In 1st trimester. All other systems reviewed and are negative.PAST 
HISTORYSee nurses notes. Problems: Hyperemesis Gravidarum. Hypokalemia. 
Dehydration. Discomfort of Pregnancy. OB History. Pregnant. Psoriasis. Other 
Disease. Pregnancy Problems.                                                    
                                                       2                        
    Clinical Report - Physicians/Mid Levels                                     
   [facility]                                        Emergency 
Department                                  [address] [place] [address]
                                    Phone #: [phone] ext- [phone]           
                                 2020 13:54                          
----------------------------------------------------                            
          Patient: ODILIA SANTIAGO                                    MRN: 
[id number]      [facility]#: [id number]                                  Sex: F : 
1995 Age: 24y Additional Surgeries: C- sections. Cellulitus. Csection. C-
Section. Elbow wash out. Vertigo. Medications: Meclizine HCl Oral 25 mg, as 
needed. Allergies: No Known Drug Allergy.SOCIAL HISTORYNever smoker. No alcohol 
use or drug use. No recent travel.ADDITIONAL NOTESThe nursing notes have been 
reviewed with agreement regarding the chief complaint, HPI, ROS, PMH andpatient 
medications and allergies.PHYSICAL EXAMVital Signs: 2020 14:08 BP: 98/40. 
MAP: 59.2020 14:02 HR: 90. RR: 16. O2 saturation: 96%. Temp: 98 F. Pain 
level now: 10. Have beenreviewed as abnormal and appear to be correct. 
Hypotensive. Mean arterial pressure- normal. Heartrate normal. Respiratory rate 
normal. Temperature normal. Oxygen saturation normal.Appearance: Alert. Oriented
 X3. No acute distress.Eyes: Pupils equal, round and reactive to light. Eyes 
normal inspection.ENT: Ears normal. Nose normal. Dry mucous membranes present. 
Pharynx normal.Neck: Normal inspection. Neck supple.CVS: Normal heart rate and 
rhythm. Heart sounds normal. Pulses normal.Respiratory: No respiratory distress.
 Painless inspiration. Breath sounds normal.Abdomen: Soft and nontender. Bowel 
sounds normal. No organomegaly. No mass.Back: Normal inspection.Skin: Skin warm 
and dry. Normal skin color. No rash. Normal skin turgor.Extremities: Extremities
 exhibit normal ROM. No lower extremity edema.Neuro: Oriented X 3. No motor 
deficit. No sensory deficit. Reflexes normal.LABS, X-RAYS, AND EKGLaboratory 
Tests: Laboratory tests have been ordered, with results reviewed and considered 
in themedical decision making process. Beta-HCG, Quant Serum:     (GUERITA: 
2020 15:40)         ( MsgRcvd 2020 16:30) Final results     **Test**
                                **Result**   **Flag**   **Units**     
**(Reference)**     HCG QUANT                               970574.0            
    mIU/mL                                                                      
                                        3                         Clinical 
Report - Physicians/Mid Levels                                    [facility]                                    Emergency Department                
              [address] [place] [address]                              
  Phone #: [phone]                                         13:54                      
----------------------------------------------------                            
      Patient: ODILIA SANTIAGO                                MRN: [id number]      
Acct#: [id number]                              Sex: F : 1995 Age: 24y    
                               Interpretation:                            Less 
than 5 mU/mL: Negative6-10 mU/mL: Borderline (suggest repeat in 48 hours)       
                           >10: PositiveApprox HCG range (mU/mL) Weeks post LMP 
                           5.4-708 mU/mL 3-4 Fsdpu883-70831 mU/mL 5-6 Weeks     
                       4059-525586 mU/mL 7-8 Dkrre68493-715380 mU/mL 9-10 Weeks 
                           85947-10282 mU/mL 12-14 Llods33493-25269 mU/mL 15-16 
Weeks                            8240-80755 mU/mL 17-18 WeeksCBC w Diff:      
(GUERITA: 2020 15:40)            ( MsgRcvd 2020 15:56) Final results 
**Test**                                   **Result**       **Flag**    
**Units**      **(Reference)** CBC W/AUTOMATED DIFF     COMPLETE BLOOD COUNT WBC
                                        7.8                          10/uL      
   (4.2 - 11.0) RBC                                        5.18                 
        10/uL         (4.20 - 5.40) HEMOGLOBIN                                 
13.9                         g/dL            (12.0 - 16.0) HEMATOCRIT           
                      42.3                         %               (37.0 - 47.0)
 MCV                                        81.7                         fL     
         (81.0 - 101) MCH                                        26.8           
  L           pg              (27.0 - 34.0) MCHC                                
       32.9                         g/dL            (31.0 - 36.0) RDW           
                             15.2             H           %               (11.5 
- 14.5) PLATELETS                                  247                          
10/uL         (150 - 450) MPV                                        11.9       
      H           fL              (7.4 - 10.4) NEUT                             
          56.6                         %               (37.0 - 80.0) LYMPH      
                                21.4             L           %               
(25.0 - 40.0) MONO                                       6.2                    
      %               (3.0 - 8.0) EOS                                        
14.4             H           %               (0.0 - 7.0) BASO                   
                    1.0                          %               (0.0 - 2.5) %IG
                                        0.4              H           %          
     (0.0 - 0.0) %NRBC                                      0.0                 
         %               (0.0 - 0.0) #NEUT                                      
4.41                         10/uL         (2.00 - 6.90) #LYMPH                 
                    1.67                         10/uL         (0.60 - 3.40) 
#MONO                                      0.48                         10/uL   
      (0.00 - 0.90) #EOS                                       1.12             
H           10/uL         (0.00 - 0.70) #BASO                                   
   0.08                         10/uL         (0.00 - 0.20) #IG                 
                       0.03                         10/uL         (0.00 - 0.10) 
#NRBC                                      0.00                         10/uL   
      (0.00 - 0.00) MANUAL DIFF                                NOT INDICATED RBC
 MORPH                                  NOT INDICATEDCMP:    (GUERITA: 2020 
15:40)            ( MsgRcvd 2020 16:32) Final results **Test**            
                       **Result**       **Flag**    **Units**      
**(Reference)** COMPREHENSIVE METABOLIC PANEL     COMPREHENSIVE METABOLIC PANEL 
SODIUM                                     135                          mEq/L   
        (134 - 153) POTASSIUM                                  4.0              
            mEq/L           (3.6 - 5.0) CHLORIDE                                
   98                           mEq/L           (98 - 107) CO2                  
                      21               L           MEQ/L           (22 - 30) 
GLUCOSE                                    99                           MG/DL   
        (65 - 110) BUN                                        10                
           MG/DL           (7 - 21) CREATININE                                 
0.5              L           MG/DL           (0.7 - 1.5) BUN/CREAT              
                    20                                           (8 - 27) TOTAL 
PROTEIN                              7.2                          G/DL          
  (6.3 - 8.2) ALBUMIN                                    4.6                    
      G/DL            (3.9 - 5.0) GLOBULIN                                   2.6
                          GM/DL           (2.4 - 3.2) A/G RATIO                 
                 1.8                                          (0.8 - 2.0) 
CALCIUM                                    10.2                         MG/DL   
        (8.4 - 10.2) TOTAL BILI                                 1.1             
             MG/DL           (0.2 - 1.3) ALKALINE PHOS                          
    84                           U/L             (38 - 126)                     
                                                                                
                    4                          Clinical Report - Physicians/Mid 
Levels                                     [facility]               
                      Emergency Department                               [address] [place] [address]                                 Phone #: [phone]                                         2020 13:54     
                  ----------------------------------------------------          
                         Patient: ODILIA SANTIAGO                              
   MRN: [id number]      Acct#: [id number]                               Sex: F : 
1995 Age: 24y  SGOT/AST                                 32                
           U/L               (5 - 40)  SGPT/ALT                                 
55                           U/L               (7 - 56)  ANION GAP              
                  16.0                         mmol/L            (8.0 - 16.0)  
AGE                                      24                           yrs  NON-
AA GFR                               >60                          mL/min  AFR 
AMER GFR                             >60                          mL/min        
                        Male GFR Interprentation                   20-49 yrs    
   >60 mL/min   Gnxpmu33-41 yrs     >56 mL/min   Normal                   60-69 
yrs     >49 mL/min    Normal                   70-79yrs>42 mL/min   Normal      
             80 and above >35 mL/min     Normal                      Female 
GFRInterpretation                   20-39 yrs      >60 mL/min   Normal          
          40-49 yrs     >58 mL/minNormal                   50-59 yrs     >51 
mL/min    Normal                   60-69 yrs      >45 mL/min   Umadxp81-01 yrs  
   >39 mL/min    Normal                   80 and above >32 mL/min     
NormalLactic Acid:       (GUERITA: 2020 15:40)            ( MsgRcvd 
2020 16:04) Final results  **Test**                                       
**Result**         **Flag**      **Units**       **(Reference)**  LACTIC ACID   
                                 2.2                              MMOL/L        
   (0.2 - 2.2)Lipase:    (GUERITA: 2020 15:40)                    ( MsgRcvd 
2020 16:31) Final results  **Test**                                       
**Result**         **Flag**      **Units**       **(Reference)**  LIPASE        
                                 21                               U/L           
   (13 - 60)Urinalysis:       (GUERITA: 2020 16:40)             ( MsgRcvd 
2020 17:13) Final results  **Test**                                       
**Result**         **Flag**      **Units**       **(Reference)**  URINALYSIS    
  URINALYSIS  SOURCE                                         R   COLOR          
                               alonso                                            
 (NORMAL: Yello   CLARITY                                       clear           
                                  (NORMAL: Clear   SPEC GRAVITY                 
                 1.010                                             (1.001 - 
1.030   pH                                            7                         
                        (5 - 9)   GLUCOSE                                       
NORM                                              (NORMAL: Negat   BILIRUBIN    
                                 NEG                                            
   (NORMAL: Negat   KETONE                                        150           
     A                              (NORMAL: Negat   PROTEIN                    
                   15                                                (NORMAL: 
Negat   NITRITE                                       NEG                       
                        (NORMAL: Negat   BLOOD                                  
       NEG                                               (NORMAL: Negat   LEUK 
EST                                      25                                     
           (NORMAL: Negat   UROBILINOGEN                                  4     
                                            (less than 1.0   MICROSCOPIC        
                           See Below   WBC                                      
     1 - 3                                             (NORMAL:   NONE   RBC    
                                       0 - 1                                    
         (NORMAL:   NONE   EPITHELIAL                                    
MODERATE           A                              (NORMAL:   NONE   BACTERIA    
                                  1+ SMALL                                      
    (NORMAL:   NONE   MUCOUS                                        2+          
       A                              (NORMAL:   NONEUS OB 1ST TRI UP TO 14 
WEEKS:      (GUERITA: 2020 14:43)                         ( [facility] In ProgressUS OB 1ST TRI UP TO 14 WEEKSReason(s): lower abd 
pain, vomitingTRANSPORTATION: S                            IV?    IV?(Yes)    
O2?       Oxygen?(No)    Ro.Note - Tests: (OB US- SLIUP  9w5d edc 
3/4/21).                                                                        
                                           5                           Clinical 
Report - Physicians/Mid Levels                                      [facility]                                      Emergency Department         
                       [address] [place] [address]                     
             Phone #: [phone] ext- [phone]                                  
        2020 13:54                        
----------------------------------------------------                            
        Patient: ODILIA SANTIAGO                                  MRN: [id number]  
    Acct#: [id number]                                Sex: F : 1995 Age: 
24yPROGRESS AND PROCEDURESCourse of Care: 17:32 Aug 04 2020. Pt feeling improved
 after IV fluids and reglan in ED, no episodes ofvomiting while in ED, may have 
mild UTI, will tx as outpt, advised 3 day PCM f/u, and advised returnprecau
tions. Disposition: Discharged home in good and improved condition. Discharge 
decision based on the following: patient's condition is improved; patient is 
ambulatory; patient is active; patient's exam is improved; minimally abnormal 
test results; improving condition on repeat evaluation; social support is 
adequate; transportation is available; follow-up is available; clinical 
impression is consistent with outpatient treatment.CLINICAL IMPRESSION Acute 
urinary tract infection with cystitis and hematuria. Not associated with 
indwelling catheter or obstruction. Moderate hyperemesis gravidarum less than 21
 weeks with dehydration.INSTRUCTIONS No strenuous activity until better. Rest at
 home for two days. Do not work for two days. Drink plenty of fluids for the 
next 48 hours as needed. Avoid alcohol and NSAIDS. NSAIDS include aspirin, 
ibuprofen (Advil) and naproxen (Aleve). Avoid fatty, fried/greasy, lactose-
containing (such as milk, cheese and ice cream), salty and spicy foods until 
better. No alcohol. Do not smoke. Warnings: Further evaluation is necessary. It 
is very important to follow up with a healthcare provider. GENERAL WARNINGS: 
Return or contact your physician immediately if your condition worsens or 
changes unexpectedly, if not improving as expected, or if other problems arise. 
SPECIFICALLY, return if you develop pain in the abdomen, back or shoulder, 
fever, vomiting, the inability to keep fluids down, blood in vomitus, blood in 
diarrhea, fainting, lightheadedness or vaginal bleeding. Your Current 
Medications: Your current home medications have been reviewed. CONTINUE TAKING 
THE FOLLOWING MEDICATIONS: Meclizine HCl Oral : 25 mg, prn. Prescription 
Medications: Macrobid 100 mg capsule Take 1 capsule twice a day as directed for 
7 days -- for UTI. Dispense 14 capsule. Refills: 0. Substitution permitted. 
Pharmacy - [facility] - [address] ; [place] [address]. 
Phone: [phone] FaxNumber: [phone]. Follow-up: Follow up with your 
healthcare provider in three days even if well. Call for the next available 
appointment.                                                                    
                                           6                          Clinical 
Report - Physicians/Mid Levels                                     [facility]                                     Emergency Department              
                 [address] [place] [address]                           
      Phone #: [phone] ext- [phone]                                         
2020 13:54                       ----------------------------------------
------------                                   Patient: ODILIA SANTIAGO        
                         MRN: [id number]      [id number]                       
        Sex: F : 1995 Age: 24y Reason for referral: evaluation. Summary
 of care provided to patient and follow-up provider via paper. Understanding of 
the discharge instructions verbalized by patient. Expected course of illness, 
discharge instructions, activity level, prescriptions x1, follow-up appointment 
and risks and benefits of treatment reviewed with patient and understanding 
verbalized. Agrees to plan of care.(Electronically signed by JHOAN English 
2020 18:28)  









          Name      Value     Range     Interpretation Code Description Data Karlie

rce(s) Supporting 

Document(s)

 

                                                                       









                    ID                  Date                Data Source

 

                    [id number]     2020 11:32:00 AM EDT [facility]









          Name      Value     Range     Interpretation Code Description Data Karlie

rce(s) Supporting 

Document(s)

 

          CULTURE URINE                                         [facility]

spital  

 

                                            _CULTURE 

URINE_$$[id number]$$[id number]$$904625$$328090$$727046$$688560$$830125$$087894$$066415$$

[id number]$$311753$$337577$$958956$$752457$$602169$$023167$$570147$$503645$$250914$$

[id number]$$734684$$447168$$024937$$491496$$675814$$952350$$123763                  
       -- Continued on next page --Patient: PAMELA MCKEON                
Order: [id number]                   Page 2Culture: CULTURE URINE                     
                      Status: 
Final===========================================================================

====                         -- Continued on next page --Patient: PAMELA ALURA 
M                Order: [id number]                   Page 2Culture: CULTURE URINE    
                                      Status: 
Prelim==========================================================================

=====                         -- Continued on next page --Patient: PAMELA MCKEON                Order: [id number]                   Page 2Culture: CULTURE URINE   
                                       Status: 
Prelim==========================================================================

=====$$[id number]$$[id number] DATE/TIME: 2020 10:06Culture: CULTURE URINE 
                                          Status: FinalUrine 
Culture,Comprehensive:                                                  P1No 
growth in 36 - 48 hours.    **** Previous result entered on 2020 14:02 ET 
****No growth after 18-24 hours.    **** Previous result entered on 2020 
05:39 ET ****Specimen has been received and testing has been initiated.P1 Test 
performed by: [facility] #: [id number]        
              [address] [place] [address]                      [id number]                   
   [facility] [id number] Director  :   Reyes Araceli B MD     
                  NPI #:      :                                     
                                               [id number].SENT REF       
                                                                             
[id number].SENT REF                                                      
                              [id number].SENT REF 









                    ID                  Date                Data Source

 

                    [id number]     2020 05:12:00 PM EDT [facility]









          Name      Value     Range     Interpretation Code Description Data Karlie

rce(s) Supporting 

Document(s)

 

          URINALYSIS                                         [facility]

te  

 

                                            URINALYSIS 

 

          SOURCE    R                                       [facility]

al  

 

          COLOR     alonso     NORMAL: Yellow                     [facility]

ospital  

 

          CLARITY   clear     NORMAL: Clear                     [facility]

spital  

 

           Specific gravity of Urine by Test strip 1.010      1.001 - 1.030     

                  [facility]                                 

 

          pH        7         5 - 9                         [facility]

al  

 

           Glucose [Mass/volume] in Urine by Test strip NORM       NORMAL: Negat

[facility]                            

 

           Bilirubin.total [Presence] in Urine by Test strip NEG        NORMAL: 

Negative                       

[facility]                   

 

           Ketones [Presence] in Urine by Test strip 150        NORMAL: Negative

 A                     [facility]                                

 

           Protein [Mass/volume] in Urine by Test strip 15         NORMAL: Negat

[facility]                            

 

           Nitrite [Presence] in Urine by Test strip NEG        NORMAL: Negative

                       [facility]                                

 

          BLOOD     NEG       NORMAL: Negative                     [facility]  

 

           Leukocyte esterase [Presence] in Urine by Test strip 25         CAITY

L: Negative                       

[facility]                   

 

           Urobilinogen [Mass/volume] in Urine by Test strip 4          less radha

n 1.0 mg/dL                       

[facility]                   

 

          MICROSCOPIC See Below                               [facility]

ital  

 

          WBC       1 - 3     NORMAL: NONE SEEN                     [facility]  

 

           Erythrocytes [#/volume] in Urine by Test strip 0 - 1      NORMAL: NON

E SEEN                       

[facility]                   

 

          EPITHELIAL MODERATE  NORMAL: NONE SEEN A                   [facility]  

 

                    Bacteria [Presence] in Urine sediment by Light microscopy 1+

 SMALL            NORMAL: NONE 

SEEN                                            [facility]  

 

           Mucus [Presence] in Urine sediment by Light microscopy 2+         NOR

MAL: NONE SEEN A                     

[facility]                   









                    ID                  Date                Data Source

 

                    [id number]     2020 04:32:00 PM EDT [facility]









          Name      Value     Range     Interpretation Code Description Data Karlie

rce(s) Supporting 

Document(s)

 

          COMPREHENSIVE METABOLIC PANEL                                         

[facility]  

 

                                            COMPREHENSIVE METABOLIC PANEL 

 

           Sodium [Moles/volume] in Serum or Plasma 135 mEq/L  134 - 153        

                [facility]                                 

 

           Potassium [Moles/volume] in Serum or Plasma 4.0 mEq/L  3.6 - 5.0     

                   [facility]                            

 

           Chloride [Moles/volume] in Serum or Plasma 98 mEq/L   98 - 107       

                  [facility]                                 

 

           Carbon dioxide, total [Moles/volume] in Serum or Plasma 21 MEQ/L   22

 - 30    L                     

[facility]                   

 

           Glucose [Mass/volume] in Serum or Plasma 99 MG/DL   65 - 110         

                [facility]                                 

 

          BUN       10 MG/DL  7 - 21                        [facility]

al  

 

           Creatinine [Mass/volume] in Serum or Plasma 0.5 MG/DL  0.7 - 1.5  L  

                   [facility]                            

 

          BUN/CREAT 20        8 - 27                        [facility]

al  

 

           Protein [Mass/volume] in Serum or Plasma 7.2 G/DL   6.3 - 8.2        

                [facility]                                 

 

           Albumin [Mass/volume] in Serum or Plasma 4.6 G/DL   3.9 - 5.0        

                [facility]                                 

 

           Globulin [Mass/volume] in Serum by calculation 2.6 GM/DL  2.4 - 3.2  

                      [facility]                            

 

          A/G RATIO 1.8       0.8 - 2.0                     [facility]  

 

           Calcium [Mass/volume] in Serum or Plasma 10.2 MG/DL 8.4 - 10.2       

                [facility]                                

 

           Bilirubin.total [Mass/volume] in Serum or Plasma 1.1 MG/DL  0.2 - 1.3

                        

[facility]                   

 

                    Alkaline phosphatase [Enzymatic activity/volume] in Serum or

 Plasma 84 U/L              38 - 

126                                             [facility]  

 

                          Aspartate aminotransferase [Enzymatic activity/volume]

 in Serum or Plasma 32 U/L

             5 - 40                                 [facility]  

 

                    Alanine aminotransferase [Enzymatic activity/volume] in Seru

m or Plasma 55 U/L              7

- 56                                            [facility]  

 

           Anion gap 3 in Serum or Plasma 16.0 mmol/L 8.0 - 16.0                

       [facility]

                                         

 

          AGE       24 yrs                                  [facility]

al  

 

          NON-AA GFR >60 mL/min                               [facility]

ital  

 

          AFR AMER GFR >60 mL/min                               [facility]

spital  

 

                                                                     Male GFR In

terprentation                  20-49 yrs

    >60 mL/min   Normal                  50-59 yrs     >56 mL/min   Normal      
           60-69 yrs     >49 mL/min   Normal                  70-79yrs      >42 
mL/min   Normal                  80 and above  >35 mL/min   Normal              
     Female GFR  Interpretation                  20-39 yrs     >60 mL/min   
Normal                  40-49 yrs     >58 mL/min   Normal                  50-59
yrs     >51 mL/min   Normal                  60-69 yrs     >45 mL/min   Normal  
               70-79 yrs     >39 mL/min   Normal                  80 and above  
>32 mL/min   Normal 









                    ID                  Date                Data Source

 

                    [id number]     2020 04:31:00 PM EDT [facility]









          Name      Value     Range     Interpretation Code Description Data Karlie

rce(s) Supporting 

Document(s)

 

           Lipase [Enzymatic activity/volume] in Serum or Plasma 21 U/L     13 -

 60                          

[facility]                   









                    ID                  Date                Data Source

 

                    [id number]     2020 04:30:00 PM EDT [facility]









          Name      Value     Range     Interpretation Code Description Data Karlie

rce(s) Supporting 

Document(s)

 

             Choriogonadotropin.intact [Units/volume] in Serum or Plasma 215910.

0 mIU/mL                           

                          [facility]     

 

                                                                         Interpr

etation:                           Less 

than 5 mU/mL: Negative                 6-10 mU/mL: Borderline (suggest repeat in
 48 hours)                                 >10: Positive                     
Approx HCG range (mU/mL) Weeks post LMP                           5.4-708 mU/mL 
 3-4 Weeks                           217-22572 mU/mL 5-6 Weeks                  
         4059-906107 mU/mL 7-8 Weeks                           61734-643008 
mU/mL 9-10 Weeks                           32547-21551 mU/mL 12-14 Weeks        
                   96717-46255 mU/mL 15-16 Weeks                           8240-
65189 mU/mL 17-18 Weeks 









                    ID                  Date                Data Source

 

                    [id number]     2020 04:04:00 PM EDT [facility]









          Name      Value     Range     Interpretation Code Description Data Karlie

rce(s) Supporting 

Document(s)

 

           Lactate [Moles/volume] in Serum or Plasma 2.2 MMOL/L 0.2 - 2.2       

                 [facility]                                









                    ID                  Date                Data Source

 

                    [id number]     2020 03:56:00 PM EDT [facility]









          Name      Value     Range     Interpretation Code Description Data Karlie

rce(s) Supporting 

Document(s)

 

          CBC W/AUTOMATED DIFF                                         [facility]  

 

                                            COMPLETE BLOOD COUNT 

 

           Leukocytes [#/volume] in Blood by Automated count 7.8 10^3/uL 4.2 - 1

1.0                       

[facility]                   

 

             Erythrocytes [#/volume] in Blood by Automated count 5.18 10^6/uL 4.

20 - 5.40                

                          [facility]     

 

           Hemoglobin [Mass/volume] in Blood 13.9 g/dL  12.0 - 16.0             

          [facility]                                 

 

           Hematocrit [Volume Fraction] of Blood by Automated count 42.3 %     3

7.0 - 47.0                       

[facility]                   

 

                    Erythrocyte mean corpuscular volume [Entitic volume] by Auto

mated count 81.7 fL             

81.0 - 101                                      [facility]  

 

                          Erythrocyte mean corpuscular hemoglobin [Entitic mass]

 by Automated count 26.8 

pg           27.0 - 34.0  L                         [facility]  

 

                                        Erythrocyte mean corpuscular hemoglobin 

concentration [Mass/volume] by Automated

 count     32.9 g/dL  31.0 - 36.0                       [facility]  

 

             Erythrocyte distribution width [Ratio] by Automated count 15.2 %   

    11.5 - 14.5  H             

                          [facility]     

 

           Platelets [#/volume] in Blood by Automated count 247 10^3/uL 150 - 45

0                        

[facility]                   

 

                    Platelet mean volume [Entitic volume] in Blood by Automated 

count 11.9 fL             7.4 - 

10.4            H                               [facility]  

 

           Neutrophils/100 leukocytes in Blood by Automated count 56.6 %     37.

0 - 80.0                       

[facility]                   

 

           Lymphocytes/100 leukocytes in Blood by Manual count 21.4 %     25.0 -

 40.0 L                     

[facility]                   

 

           Monocytes/100 leukocytes in Blood by Automated count 6.2 %      3.0 -

 8.0                        

[facility]                   

 

           Eosinophils/100 leukocytes in Blood by Automated count 14.4 %     0.0

 - 7.0  H                     

[facility]                   

 

           Basophils/100 leukocytes in Blood by Automated count 1.0 %      0.0 -

 2.5                        

[facility]                   

 

          %IG       0.4 %     0.0 - 0.0 H                   [facility]

al  

 

          %NRBC     0.0 %     0.0 - 0.0                     [facility]

al  

 

           Neutrophils [#/volume] in Blood by Automated count 4.41 10^3/uL 2.00 

- 6.90                       

[facility]                   

 

           Lymphocytes [#/volume] in Blood by Automated count 1.67 10^3/uL 0.60 

- 3.40                       

[facility]                   

 

           Monocytes [#/volume] in Blood by Automated count 0.48 10^3/uL 0.00 - 

0.90                       

[facility]                   

 

           Eosinophils [#/volume] in Blood by Automated count 1.12 10^3/uL 0.00 

- 0.70 H                     

[facility]                   

 

           Basophils [#/volume] in Blood by Automated count 0.08 10^3/uL 0.00 - 

0.20                       

[facility]                   

 

          #IG       0.03 10^3/uL 0.00 - 0.10                     [facility]

ospital  

 

          #NRBC     0.00 10^3/uL 0.00 - 0.00                     [facility]

ospital  

 

          MANUAL DIFF NOT INDICATED                               [facility]  

 

          RBC MORPH NOT INDICATED                               [facility]

spital  









                    ID                  Date                Data Source

 

                    [id number]     2020 08:40:00 AM EDT [facility]









          Name      Value     Range     Interpretation Code Description Data Karlie

rce(s) Supporting 

Document(s)

 

           Magnesium [Mass/volume] in Serum or Plasma 1.8 MG/DL  1.7 - 2.2      

                  [facility]                                









                    ID                  Date                Data Source

 

                    [id number]     2020 08:40:00 AM EDT [facility]

 Hospital









          Name      Value     Range     Interpretation Code Description Data Karlie

rce(s) Supporting 

Document(s)

 

          BASIC METABOLIC PANEL                                         [facility]  

 

                                            BASIC METABOLIC PANEL 

 

           Sodium [Moles/volume] in Serum or Plasma 136 mEq/L  134 - 153        

                [facility]                                 

 

           Potassium [Moles/volume] in Serum or Plasma 4.1 mEq/L  3.6 - 5.0     

                   [facility]                            

 

           Chloride [Moles/volume] in Serum or Plasma 104 mEq/L  98 - 107       

                  [facility]                                 

 

           Carbon dioxide, total [Moles/volume] in Serum or Plasma 20 MEQ/L   22

 - 30    L                     

[facility]                   

 

           Glucose [Mass/volume] in Serum or Plasma 93 MG/DL   65 - 110         

                [facility]                                 

 

          BUN       <3 MG/DL  7 - 21    L                   [facility]  

 

           Creatinine [Mass/volume] in Serum or Plasma 0.4 MG/DL  0.7 - 1.5  L  

                   [facility]                            

 

          BUN/CREAT 8         8 - 27                        [facility]  

 

           Calcium [Mass/volume] in Serum or Plasma 8.9 MG/DL  8.4 - 10.2       

                [facility]                                 

 

           Anion gap 3 in Serum or Plasma 12.0 mmol/L 8.0 - 16.0                

       [facility]

                                         

 

          AGE       24 yrs                                  [facility]

al  

 

          AFR AMER GFR >60 mL/min                               [facility]

spital  

 

          NON-AA GFR >60 mL/min                               [facility]

ital  

 

                                                                  Male GFR Inter

prentation                  20-49 yrs   

  >60 mL/min   Normal                  50-59 yrs     >56 mL/min   Normal        
          60-69 yrs     >49 mL/min   Normal                  70-79yrs      >42 
mL/min   Normal                  80 and above  >35 mL/min   Normal              
      Female GFR  Interpretation                  20-39 yrs     >60 mL/min   
Normal                  40-49 yrs     >58 mL/min   Normal                  50-59
 yrs     >51 mL/min   Normal                  60-69 yrs     >45 mL/min   Normal 
                 70-79 yrs     >39 mL/min   Normal                  80 and above
  >32 mL/min   Normal 









                    ID                  Date                Data Source

 

                    [id number]     2020 07:01:00 AM EDT [facility]









          Name      Value     Range     Interpretation Code Description Data Karlie

rce(s) Supporting 

Document(s)

 

          BASIC METABOLIC PANEL                                         [facility]  

 

                                            BASIC METABOLIC PANEL 

 

           Sodium [Moles/volume] in Serum or Plasma 137 mEq/L  134 - 153        

                [facility]                                 

 

           Potassium [Moles/volume] in Serum or Plasma 3.8 mEq/L  3.6 - 5.0     

                   [facility]                            

 

           Chloride [Moles/volume] in Serum or Plasma 108 mEq/L  98 - 107   H   

                  [facility]                                 

 

           Carbon dioxide, total [Moles/volume] in Serum or Plasma 18 MEQ/L   22

 - 30    L                     

[facility]                   

 

           Glucose [Mass/volume] in Serum or Plasma 87 MG/DL   65 - 110         

                [facility]                                 

 

          BUN       <3 MG/DL  7 - 21    L                   [facility]

al  

 

           Creatinine [Mass/volume] in Serum or Plasma <0.4 MG/DL 0.7 - 1.5  L  

                   [facility]                            

 

          BUN/CREAT 10        8 - 27                        [facility]  

 

           Calcium [Mass/volume] in Serum or Plasma 8.3 MG/DL  8.4 - 10.2 L     

                [facility]                                 

 

           Anion gap 3 in Serum or Plasma 11.0 mmol/L 8.0 - 16.0                

       [facility]

                                         

 

          AGE       24 yrs                                  [facility]

al  

 

          AFR AMER GFR >60 mL/min                               [facility]

spital  

 

          NON-AA GFR >60 mL/min                               [facility]

ital  

 

                                                                  Male GFR Inter

prentation                  20-49 yrs   

  >60 mL/min   Normal                  50-59 yrs     >56 mL/min   Normal        
          60-69 yrs     >49 mL/min   Normal                  70-79yrs      >42 
mL/min   Normal                  80 and above  >35 mL/min   Normal              
      Female GFR  Interpretation                  20-39 yrs     >60 mL/min   
Normal                  40-49 yrs     >58 mL/min   Normal                  50-59
 yrs     >51 mL/min   Normal                  60-69 yrs     >45 mL/min   Normal 
                 70-79 yrs     >39 mL/min   Normal                  80 and above
  >32 mL/min   Normal 









                    ID                  Date                Data Source

 

                    [id number]     2020 06:58:00 AM EDT [facility]









          Name      Value     Range     Interpretation Code Description Data Karlie

rce(s) Supporting 

Document(s)

 

           Magnesium [Mass/volume] in Serum or Plasma 1.8 MG/DL  1.7 - 2.2      

                  [facility]                                









                    ID                  Date                Data Source

 

                    [id number]     2020 07:03:00 AM EDT [facility]









          Name      Value     Range     Interpretation Code Description Data Karlie

rce(s) Supporting 

Document(s)

 

          CBC W/AUTOMATED DIFF                                         [facility]  

 

                                            COMPLETE BLOOD COUNT 

 

           Leukocytes [#/volume] in Blood by Automated count 5.8 10^3/uL 4.2 - 1

1.0                       

[facility]                   

 

             Erythrocytes [#/volume] in Blood by Automated count 4.15 10^6/uL 4.

20 - 5.40  L             

                          [facility]     

 

           Hemoglobin [Mass/volume] in Blood 11.0 g/dL  12.0 - 16.0 L           

          [facility]                                 

 

           Hematocrit [Volume Fraction] of Blood by Automated count 33.4 %     3

7.0 - 47.0 L                     

[facility]                   

 

                    Erythrocyte mean corpuscular volume [Entitic volume] by Auto

mated count 80.5 fL             

81.0 - 101      L                               [facility]  

 

                          Erythrocyte mean corpuscular hemoglobin [Entitic mass]

 by Automated count 26.5 

pg           27.0 - 34.0  L                         [facility]  

 

                                        Erythrocyte mean corpuscular hemoglobin 

concentration [Mass/volume] by Automated

 count     32.9 g/dL  31.0 - 36.0                       [facility]  

 

             Erythrocyte distribution width [Ratio] by Automated count 15.2 %   

    11.5 - 14.5  H             

                          [facility]     

 

           Platelets [#/volume] in Blood by Automated count 195 10^3/uL 150 - 45

0                        

[facility]                   

 

                    Platelet mean volume [Entitic volume] in Blood by Automated 

count 11.7 fL             7.4 - 

10.4            H                               [facility]  

 

           Neutrophils/100 leukocytes in Blood by Automated count 60.2 %     37.

0 - 80.0                       

[facility]                   

 

           Lymphocytes/100 leukocytes in Blood by Manual count 28.1 %     25.0 -

 40.0                       

[facility]                   

 

           Monocytes/100 leukocytes in Blood by Automated count 7.0 %      3.0 -

 8.0                        

[facility]                   

 

           Eosinophils/100 leukocytes in Blood by Automated count 3.9 %      0.0

 - 7.0                        

[facility]                   

 

           Basophils/100 leukocytes in Blood by Automated count 0.5 %      0.0 -

 2.5                        

[facility]                   

 

          %IG       0.3 %     0.0 - 0.0 H                   [facility]

al  

 

          %NRBC     0.0 %     0.0 - 0.0                     [facility]

al  

 

           Neutrophils [#/volume] in Blood by Automated count 3.51 10^3/uL 2.00 

- 6.90                       

[facility]                   

 

           Lymphocytes [#/volume] in Blood by Automated count 1.64 10^3/uL 0.60 

- 3.40                       

[facility]                   

 

           Monocytes [#/volume] in Blood by Automated count 0.41 10^3/uL 0.00 - 

0.90                       

[facility]                   

 

           Eosinophils [#/volume] in Blood by Automated count 0.23 10^3/uL 0.00 

- 0.70                       

[facility]                   

 

           Basophils [#/volume] in Blood by Automated count 0.03 10^3/uL 0.00 - 

0.20                       

[facility]                   

 

          #IG       0.02 10^3/uL 0.00 - 0.10                     [facility]

ospital  

 

          #NRBC     0.00 10^3/uL 0.00 - 0.00                     [facility]

ospital  

 

          MANUAL DIFF NOT INDICATED                               [facility]  

 

          RBC MORPH NOT INDICATED                               [facility]

spital  









                    ID                  Date                Data Source

 

                    [id number]     2020 07:00:00 AM EDT [facility]









          Name      Value     Range     Interpretation Code Description Data Karlie

rce(s) Supporting 

Document(s)

 

           Magnesium [Mass/volume] in Serum or Plasma 1.5 MG/DL  1.7 - 2.2  L   

                  [facility]                                









                    ID                  Date                Data Source

 

                    [id number]     2020 07:00:00 AM EDT [facility]









          Name      Value     Range     Interpretation Code Description Data Karlie

rce(s) Supporting 

Document(s)

 

          COMPREHENSIVE METABOLIC PANEL                                         

[facility]  

 

                                            COMPREHENSIVE METABOLIC PANEL 

 

           Sodium [Moles/volume] in Serum or Plasma 140 mEq/L  134 - 153        

                [facility]                                 

 

           Potassium [Moles/volume] in Serum or Plasma 3.0 mEq/L  3.6 - 5.0  L  

                   [facility]                            

 

           Chloride [Moles/volume] in Serum or Plasma 109 mEq/L  98 - 107   H   

                  [facility]                                 

 

           Carbon dioxide, total [Moles/volume] in Serum or Plasma 20 MEQ/L   22

 - 30    L                     

[facility]                   

 

           Glucose [Mass/volume] in Serum or Plasma 95 MG/DL   65 - 110         

                [facility]                                 

 

          BUN       <2 MG/DL  7 - 21    L                   [facility]

al  

 

           Creatinine [Mass/volume] in Serum or Plasma <0.4 MG/DL 0.7 - 1.5  L  

                   [facility]                            

 

          BUN/CREAT 3         8 - 27    L                   [facility]  

 

           Protein [Mass/volume] in Serum or Plasma 5.6 G/DL   6.3 - 8.2  L     

                [facility]                                 

 

           Albumin [Mass/volume] in Serum or Plasma 3.6 G/DL   3.9 - 5.0  L     

                [facility]                                 

 

           Globulin [Mass/volume] in Serum by calculation 2.0 GM/DL  2.4 - 3.2  

L                     [facility]                            

 

          A/G RATIO 1.8       0.8 - 2.0                     [facility]  

 

           Calcium [Mass/volume] in Serum or Plasma 8.3 MG/DL  8.4 - 10.2 L     

                [facility]                                 

 

           Bilirubin.total [Mass/volume] in Serum or Plasma 0.8 MG/DL  0.2 - 1.3

                        

[facility]                   

 

                    Alkaline phosphatase [Enzymatic activity/volume] in Serum or

 Plasma 57 U/L              38 - 

126                                             [facility]  

 

                          Aspartate aminotransferase [Enzymatic activity/volume]

 in Serum or Plasma 17 U/L

             5 - 40                                 [facility]  

 

                    Alanine aminotransferase [Enzymatic activity/volume] in Seru

m or Plasma 31 U/L              7

- 56                                            [facility]  

 

           Anion gap 3 in Serum or Plasma 11.0 mmol/L 8.0 - 16.0                

       [facility]

                                         

 

          AGE       24 yrs                                  [facility]

al  

 

          NON-AA GFR >60 mL/min                               [facility]

ital  

 

          AFR AMER GFR >60 mL/min                               [facility]

spital  

 

                                                                     Male GFR In

terprentation                  20-49 yrs

    >60 mL/min   Normal                  50-59 yrs     >56 mL/min   Normal      
           60-69 yrs     >49 mL/min   Normal                  70-79yrs      >42 
mL/min   Normal                  80 and above  >35 mL/min   Normal              
     Female GFR  Interpretation                  20-39 yrs     >60 mL/min   
Normal                  40-49 yrs     >58 mL/min   Normal                  50-59
yrs     >51 mL/min   Normal                  60-69 yrs     >45 mL/min   Normal  
               70-79 yrs     >39 mL/min   Normal                  80 and above  
>32 mL/min   Normal 









                    ID                  Date                Data Source

 

                    [id number]     2020 07:12:00 PM EDT [facility]









          Name      Value     Range     Interpretation Code Description Data Karlie

rce(s) Supporting 

Document(s)

 

          URINALYSIS                                         [facility]  

 

                                            URINALYSIS 

 

          SOURCE    R                                       [facility]  

 

          COLOR     yellow    NORMAL: Yellow                     [facility]

ospital  

 

          CLARITY   clear     NORMAL: Clear                     [facility]

spital  

 

           Specific gravity of Urine by Test strip 1.010      1.001 - 1.030     

                  [facility]                                 

 

          pH        6         5 - 9                         [facility]

al  

 

           Glucose [Mass/volume] in Urine by Test strip NORM       NORMAL: Negat

[facility]                            

 

           Bilirubin.total [Presence] in Urine by Test strip NEG        NORMAL: 

Negative                       

[facility]                   

 

           Ketones [Presence] in Urine by Test strip NEG        NORMAL: Negative

                       [facility]                                

 

           Protein [Mass/volume] in Urine by Test strip NEG        [facility]                            

 

           Nitrite [Presence] in Urine by Test strip NEG        NORMAL: Negative

                       [facility]                                

 

          BLOOD     NEG       NORMAL: Negative                     [facility]  

 

           Leukocyte esterase [Presence] in Urine by Test strip NEG        [facility]

L: Negative                       

[facility]                   

 

           Urobilinogen [Mass/volume] in Urine by Test strip NOR        less radha

n 1.0 mg/dL                       

[facility]                   

 

          MICROSCOPIC Not Indicate                               [facility]

ospital  









                    ID                  Date                Data Source

 

                    [id number]     2020 08:53:00 AM EDT [facility] [address] [place] [address] PHONE: [phone]

 FAX: [phone]  Name .................. : PAMELA MCKEON              Acct 
Number.................. : [id number] ROOM. ................. : 109-1             
           MR Number ................... : [id number] Stay type ............. : O/P 
                         Discharge Date......... ... : Admit Date ......... : 
20                        Admit Phys .................... : NIEVES LOCKHART Date
 of Birth ....... : 1995                     Family Phys 
................... : UNKNOWN  Phone .................. : [phone]        
        Age ................................ : 24 Film# .................. 
.:[id number]                      Sex ................................. : F  
Unsigned transcriptions are preliminary reports and do not represent a medical 
or legal document         OB 1ST TRI UP TO 14 WEEKS [address]           
COMPLETE:20 15:34 [facility] [id number]                  Reason(s): left pelvic pain, 
N/V, EDC 3-5-21, 8 weeks pregnant     FIRST TRIMESTER OB ULTRASOUND:  
INDICATION: Left pelvic pain.  FINDINGS: A single viable intrauterine gestation 
is identified with a crown rump length of 18 mm, which corresponds to 8 weeks 2 
days plus/minus 1 week. EDC is 3/6/21. Fetal heart rate is 183 beats per minute.
  The uterus measures 7.6 x 7.2 x 5.7 cm.  The right ovary measures 2.2 x 2.7 x 
2.1 cm. The corpus luteum is identified, measuring 1.5 cm.  The left ovary 
measures 2.2 x 1.9 x 1.7 cm.  No free fluid or torsion is identified.  
IMPRESSION: Single live intrauterine gestation at 8 weeks 2 days plus/minus 1 we
ek. EDC is 3/6/21. Fetal heart rate is 183 beats per minute.  Examination 
dictated by JHOAN Garcias. Examination was reviewed with José Medina MD, radiologist at the time of this dictation.   _
__________________________________ Electronically Reviewed and Signed By JOSÉ MEDINA MD                        , 20 08:53, [facility]  Transcribe Initials:
 DZ , Transcribe Date: 20 18:17, Dictation Date:                          
                                                                        Page 1 
of 2 [place] [address] PHONE: 
[phone] FAX: [phone]  Name .................. : PAMELA MCKEON       
       Acct Number.................. : [id number] ROOM. ................. : 109-1 
                       MR Number ................... : [id number] Stay type 
............. : O/P                          Discharge Date......... ... : Admit
 Date ......... : 20                        Admit Phys 
.................... : NIEVES LOCKHART Date of Birth ....... : 1995             
        Family Phys ................... : UNKNOWN  Phone .................. : 
[phone]                Age ................................ : 24 Film# 
.................. .:[id number]                      Sex 
................................. : F  Unsigned transcriptions are preliminary 
reports and do not represent a medical or legal document         OB 1ST TRI UP
 TO 14 WEEKS [address]           COMPLETE:20 15:34 [facility] [id number]                  
Reason(s): left pelvic pain, N/V, EDC 3-5-21, 8 weeks pregnant   Copy for: 
EMERGENCY DEPT                via modem Copy for: 710 MED REC                   
                                                                               
Page 2 of 2   









          Name      Value     Range     Interpretation Code Description Data Karlie

rce(s) Supporting 

Document(s)

 

                                                                       









                    ID                  Date                Data Source

 

                    [id number]     2020 07:08:00 AM EDT [facility]









          Name      Value     Range     Interpretation Code Description Data Karlie

rce(s) Supporting 

Document(s)

 

          COMPREHENSIVE METABOLIC PANEL                                         

[facility]  

 

                                            COMPREHENSIVE METABOLIC PANEL 

 

           Sodium [Moles/volume] in Serum or Plasma 135 mEq/L  134 - 153        

                [facility]                                 

 

           Potassium [Moles/volume] in Serum or Plasma 3.2 mEq/L  3.6 - 5.0  L  

                   [facility]                            

 

           Chloride [Moles/volume] in Serum or Plasma 109 mEq/L  98 - 107   H   

                  [facility]                                 

 

           Carbon dioxide, total [Moles/volume] in Serum or Plasma 17 MEQ/L   22

 - 30    L                     

[facility]                   

 

           Glucose [Mass/volume] in Serum or Plasma 139 MG/DL  65 - 110   H     

                [facility]                                 

 

          BUN       <2 MG/DL  7 - 21    L                   [facility]

[facility]  

 

           Creatinine [Mass/volume] in Serum or Plasma 0.4 MG/DL  0.7 - 1.5  L  

                   [facility]                            

 

          BUN/CREAT 5         8 - 27    L                   [facility]

[facility]  

 

           Protein [Mass/volume] in Serum or Plasma 5.5 G/DL   6.3 - 8.2  L     

                [facility]                                 

 

           Albumin [Mass/volume] in Serum or Plasma 3.6 G/DL   3.9 - 5.0  L     

                [facility]                                 

 

           Globulin [Mass/volume] in Serum by calculation 1.9 GM/DL  2.4 - 3.2  

L                     [facility]                            

 

          A/G RATIO 1.9       0.8 - 2.0                     [facility]

al  

 

           Calcium [Mass/volume] in Serum or Plasma 8.1 MG/DL  8.4 - 10.2 L     

                [facility]                                 

 

           Bilirubin.total [Mass/volume] in Serum or Plasma 1.7 MG/DL  0.2 - 1.3

  H                     

[facility]                   

 

                    Alkaline phosphatase [Enzymatic activity/volume] in Serum or

 Plasma 54 U/L              38 - 

126                                             [facility]  

 

                          Aspartate aminotransferase [Enzymatic activity/volume]

 in Serum or Plasma 17 U/L

             5 - 40                                 [facility]  

 

                    Alanine aminotransferase [Enzymatic activity/volume] in Seru

m or Plasma 31 U/L              7

- 56                                            [facility]  

 

          Anion gap 3 in Serum or Plasma 9.0 mmol/L 8.0 - 16.0                  

   [facility] 



 

          AGE       24 yrs                                  [facility]

al  

 

          NON-AA GFR >60 mL/min                               [facility]

ital  

 

          AFR AMER GFR >60 mL/min                               [facility]

spital  

 

                                                                     Male GFR In

terprentation                  20-49 yrs

    >60 mL/min   Normal                  50-59 yrs     >56 mL/min   Normal      
           60-69 yrs     >49 mL/min   Normal                  70-79yrs      >42 
mL/min   Normal                  80 and above  >35 mL/min   Normal              
     Female GFR  Interpretation                  20-39 yrs     >60 mL/min   
Normal                  40-49 yrs     >58 mL/min   Normal                  50-59
yrs     >51 mL/min   Normal                  60-69 yrs     >45 mL/min   Normal  
               70-79 yrs     >39 mL/min   Normal                  80 and above  
>32 mL/min   Normal 









                    ID                  Date                Data Source

 

                    [id number]     2020 07:08:00 AM EDT [facility]









          Name      Value     Range     Interpretation Code Description Data Karlie

rce(s) Supporting 

Document(s)

 

           Magnesium [Mass/volume] in Serum or Plasma 1.6 MG/DL  1.7 - 2.2  L   

                  [facility]                                









                    ID                  Date                Data Source

 

                    [id number]     2020 06:46:00 AM T [facility]









          Name      Value     Range     Interpretation Code Description Data Karlie

rce(s) Supporting 

Document(s)

 

          CBC W/AUTOMATED DIFF                                         [facility]  

 

                                            COMPLETE BLOOD COUNT 

 

           Leukocytes [#/volume] in Blood by Automated count 5.8 10^3/uL 4.2 - 1

1.0                       

[facility]                   

 

             Erythrocytes [#/volume] in Blood by Automated count 4.15 10^6/uL 4.

20 - 5.40  L             

                          [facility]     

 

           Hemoglobin [Mass/volume] in Blood 11.0 g/dL  12.0 - 16.0 L           

          [facility]                                 

 

           Hematocrit [Volume Fraction] of Blood by Automated count 33.8 %     3

7.0 - 47.0 L                     

[facility]                   

 

                    Erythrocyte mean corpuscular volume [Entitic volume] by Auto

mated count 81.4 fL             

81.0 - 101                                      [facility]  

 

                          Erythrocyte mean corpuscular hemoglobin [Entitic mass]

 by Automated count 26.5 

pg           27.0 - 34.0  L                         [facility]  

 

                                        Erythrocyte mean corpuscular hemoglobin 

concentration [Mass/volume] by Automated

 count     32.5 g/dL  31.0 - 36.0                       [facility]  

 

             Erythrocyte distribution width [Ratio] by Automated count 15.1 %   

    11.5 - 14.5  H             

                          [facility]     

 

           Platelets [#/volume] in Blood by Automated count 196 10^3/uL 150 - 45

0                        

[facility]                   

 

                    Platelet mean volume [Entitic volume] in Blood by Automated 

count 11.4 fL             7.4 - 

10.4            H                               [facility]  

 

           Neutrophils/100 leukocytes in Blood by Automated count 68.7 %     37.

0 - 80.0                       

[facility]                   

 

           Lymphocytes/100 leukocytes in Blood by Manual count 21.2 %     25.0 -

 40.0 L                     

[facility]                   

 

           Monocytes/100 leukocytes in Blood by Automated count 7.3 %      3.0 -

 8.0                        

[facility]                   

 

           Eosinophils/100 leukocytes in Blood by Automated count 1.9 %      0.0

 - 7.0                        

[facility]                   

 

           Basophils/100 leukocytes in Blood by Automated count 0.7 %      0.0 -

 2.5                        

[facility]                   

 

          %IG       0.2 %     0.0 - 0.0 H                   [facility]

[facility]  

 

          %NRBC     0.0 %     0.0 - 0.0                     [facility]

al  

 

           Neutrophils [#/volume] in Blood by Automated count 3.98 10^3/uL 2.00 

- 6.90                       

[facility]                   

 

           Lymphocytes [#/volume] in Blood by Automated count 1.23 10^3/uL 0.60 

- 3.40                       

[facility]                   

 

           Monocytes [#/volume] in Blood by Automated count 0.42 10^3/uL 0.00 - 

0.90                       

[facility]                   

 

           Eosinophils [#/volume] in Blood by Automated count 0.11 10^3/uL 0.00 

- 0.70                       

[facility]                   

 

           Basophils [#/volume] in Blood by Automated count 0.04 10^3/uL 0.00 - 

0.20                       

[facility]                   

 

          #IG       0.01 10^3/uL 0.00 - 0.10                     [facility]

ospital  

 

          #NRBC     0.00 10^3/uL 0.00 - 0.00                     [facility]

ospital  

 

          MANUAL DIFF NOT INDICATED                               [facility]  

 

          RBC MORPH NOT INDICATED                               [facility]

spital  









                    ID                  Date                Data Source

 

                    [id number]      2020 01:45:00 PM EDT [facility]

 

                                                                                

                                        

                    1                                          OrderSheet       
                             [facility]                             
       Emergency Department                              [address] [place] [address]                                Phone #: [phone]                                        2020 13:45                    
  ----------------------------------------------------                          
        Patient: ODILIA SANTIAGO                                MRN: [id number]    
  Acct#: [id number]                              Sex: F : 1995 Age: 
24yWEIGHT:69.8 kg (S) HEIGHT:66 inches (S) BMI:24.8ALLERGIES: NoneCHIEF 
COMPLAINT: vomitingDIAGNOSIS: Hyperemesis gravidarumLAB ORDERSOrder Description 
      Priority     Entered               Acknowledged          InitialedCBC w 
Diff              STAT         14:2020                            14:25
 Tameka Calix Riccardo R.N. M.D.;CMP                     
STAT         14:2020                            14:25 Tameka Calix Riccardo R.N. M.D.;Lipase                  STAT         14:2020                            14:25 Tameka Calix Riccardo R.N. M.D.;Urinalysis (Clean       STAT         14:2020       
                     15:50 Braydon Calix Riccardo                            R.N.                                    MJt LARA;HCG Serum Quant         STAT         14:24 2020                        
    14:25 Tameka Calix Riccardo R.N.                                    MSELVIN;DIAGNOSTIC 
STUDY ORDERSOrder Description  Priority     Entered                   
Acknowledged          InitialedUS OB 1ST TRI UP   STAT         14:25 2020 
                               14:29 Pia Calix 14 WEEKS                    
Av Davis                                R.NJt(Oxygen?(No))               
   M.D.;(IV?(Yes))                  NOTES: do TV if needed                  
Reason for Study: left pelvic pain, N/V, EDC 3-5-21, 8 weeks 
pregnantMEDICATION/IV/DRIP/FLUID ORDERSOrder Description    Priority        
Entered               Acknowledged          InitialedNS IV 1000 mL              
          14:25 2020                            14:29 Jannette Calix: : 
Bolus 1000                 Fideliarin, Av                            R.N.mL 
(X1)                             M.D.;Phenergan 25 mg                      14:25
 2020                            14:30 Tameka Calix                         
                                                                          2     
                                       OrderSheet                               
      [facility]                                     Emergency 
Department                               [address] [place] [address]   
                              Phone #: [phone]                 
                        2020 13:45                       ----
------------------------------------------------                                
   Patient: ODILIA SANTIAGO                                 MRN: [id number]      
Acct#: [id number]                               Sex: F : 1995 Age: 24yin 
50 mL NS, give                     Bola Daviscardo                        
R.NJtwide open: 25 mg                      M.D.;(NOW x1, HIGHALERTMEDICATION)IV 
NS 1000 mL                          15:49 2020                        
15:49 Jannette Calix : Bolus 1000                    Tameka Calix.N.;          
              R.N.mL (X1)                               Verbal order per;       
                               Lesa Av                                 
     M.DJtReglan 10 mg IVP                       16:04 2020                
        16:11 Tameka CalixX1 dose: 10 mg                        Fideliarin, Av  
                      R.N.(NOW x1)                              M.D.;IV NS 1000 
mL                          16:33 2020                        16:33 Tameka CalixBolus : Bolus 1000                    Tameka Calix R.N.;                     
   R.N.mL (X1)                               Verbal order per;                  
                    Av Davis M.D.Zofran 4 mg IVP X 1                    16:36 2020                     
   16:47 Harman Calixose: 4 mg (NOW                       Av Davis R.N.x1)                                   TUNGDJt;Pepcid IVPB 20  
                       16:36 2020                        16:47 Tameka Calixmg/50mL (NOW x1,                      Av Davis R.N.Infuse over 30                        M.D.;minutes.)D5NS IV : 150        
                  16:40 2020                        17:10 Lv Calix/hr 
                                Av Davis R.N., M.D.;GENERAL ORDERSOrder Description        
Priority      Entered                Acknowledged     InitialedNPO              
                      14:24 2020                        14:25 Tameka Calix Riccardo R.N. M.D.;Saline Lock                          
  14:24 2020                        14:25 Tameka Calix Riccardo R.N. M.D.;[Electronically signed by Tameka Calix R.N. (18:04 
2020)][Electronically signed by Av Davis M.D. (18:24 
2020)][Electronically locked by Tameka Calix R.N. (18:04 2020)]  









          Name      Value     Range     Interpretation Code Description Data Karlie

rce(s) Supporting 

Document(s)

 

                                                                       









                    ID                  Date                Data Source

 

                    [id number]      2020 01:45:00 PM EDT [facility]

 

                                                                                

                                        

         1                           Medication Reconciliation Report           
                       [facility]                                   
Emergency Department                             [address] [place] [address]                               Phone #: [phone] ext- [phone]           
                           2020 13:45                     
----------------------------------------------------                            
    Patient: ODILIA SANTIAGO                               MRN: [id number]      
Acct#: [id number]                             Sex: F : 1995 Age: 
24yWeight:      69.8 kgHeight/Length:      66 in.BMI:         24.8ALLERGIES: 
NoneThe patient's Home Medications are listed below:THE FOLLOWING MEDICATIONS 
NEED TO BE RECONCILED:   Phenergan (Promethazine) Rectal, prn   Unisom Sleepgels
Oral, prn   Vitamin B-6 Oral, prn   Zofran ODT Oral, prnThe source(s) of the 
original Home Medication information:patientThe following Medications were given
to the patient in the Emergency Department:IV NS IV Fluids bolus 1000 mL over 1 
hour(s), administered: 2020 2:29:00 PMPhenergan [IVPB] IVPB bolus 0, then 
25 mg 200 mL/hr, administered: 2020 2:30:00 PMIV NS IV Fluids bolus 1000 mL
 over 1 hour(s), administered: 2020 3:49:00 PMReglan [IVP] IVP 10 mg, 
administered: 2020 4:11:00 PMIV NS IV Fluids bolus 1000 mL over 1 hour(s), 
administered: 2020 4:33:00 PMZofran [IVP] IVP 4 mg, administered: 2020
 4:47:00 PMPepcid [IVPB] IVPB bolus 0, then 20 mg 100 mL/hr, administered: 
2020 4:47:00 PMD5NS IV IV Fluids bolus 0, then 150 mL/hr, administered: 
2020 5:10:00 PM                                                            
                   2                           Medication Reconciliation Report 
                                  [facility]                        
           Emergency Department                             [address] [place] [address]                               Phone #: [phone] ext- [phone]                                       2020 13:45                     
----------------------------------------------------                            
     Patient: ODILIA SANTIAGO                               MRN: [id number]      
Acct#: [id number]                             Sex: F : 1995 Age: 24yThe 
following Medications were prescribed to the patient:None.  









          Name      Value     Range     Interpretation Code Description Data Karlie

rce(s) Supporting 

Document(s)

 

                                                                       









                    ID                  Date                Data Source

 

                    [id number]      2020 01:45:00 PM EDT [facility]

 

                                                                                

                                        

              1                          Medication Administration Record       
                            [facility]                              
     Emergency Department                             [address] [place] [address]                               Phone #: [phone] ext- [phone]                                       2020 13:45                     
----------------------------------------------------                            
     Patient: ODILIA SANTIAGO                               MRN: [id number]      
Acct#: [id number]                             Sex: F : 1995 Age: 
24yWeight: 69.8 kgHeight/Length: 66 inBMI:    24.8ALLERGIES:        None      
Date/Time                Medication Administered           Medication 
OrderedStart                     IV NS                               NS IV 1000 
mL Bolus: : Bolus 019641:29 2020          Dose: IV Fluids                 
    mL (X1)Tameka Calix RJtNJt      Bolus: 1000 mL over 1 hour(s)----              
        Dispensed: 1000 mL bagStop                      Site: #1 right AC15:48 
2020Tameka Calix RJtNJtStart                     PHENERGAN [IVPB]            
        Phenergan 25 mg in 50 mL NS,14:30 2020          Dose: 25 mg IVPB  
                  give wide open: 25 mg (NOW x1,Tameka Calix RJtNJt      Rate: 200 
mL/hr over 15 minute(s)   HIGH ALERT MEDICATION)----                      
Dispensed: 50 mL bagStop                      Site: #1 right AC15:48 
2020Tameka Calix RJtNJtStart                     IV NS                       
        IV NS 1000 mL Bolus : Bolus 539736:49 2020          Dose: IV 
Fluids                     mL (X1)Tameka Calix RJtNJt      Bolus: 1000 mL over 1 
hour(s)----                      Dispensed: 1000 mL bagStop                     
 Site: #1 right AC16:33 2020Tameka Calix RJEREMIGiven                     
REGLAN [IVP] (METOCLOPRAMIDE        Reglan 10 mg IVP X1 dose: 10 mg16:11 2020          HCL)                                (NOW x1)Tameka Calix R.N.      
Dose: 10 mg IVP                      Site: #1 right ACStart                     
IV NS                               IV NS 1000 mL Bolus : Bolus 201238:33 
2020          Dose: IV Fluids                     mL (X1)Tameka Calix R.N. 
     Bolus: 1000 mL over 1 hour(s)----                      Dispensed: 1000 mL 
bagStop                      Site: #1 right AC17:09 2020Tameka Calix R.N.Given                     ZOFRAN [IVP] (ONDANSETRON HCL)      Zofran 4 mg 
IVP X 1 dose: 4 mg16:47 2020          Dose: 4 mg IVP                      
(NOW x1)Tameka Calix R.N.      Site: #1 right ACStart                     PEPCID 
[IVPB]                       Pepcid IVPB 20 mg/50mL (NOW16:47 2020        
  Dose: 20 mg IVPB                    x1, Infuse over 30 minutes.)Tameka Calix R.N.      Rate: 100 mL/hr over 30 minute(s)----                      Dispensed: 
50 mL bagStop                      Site: #1 right AC17:10 2020Tameka Calix R.N.Start                     D5NS IV                             D5NS IV : 150 
mL/hr17:10 2020          Dose: IV Fluids                                  
                                                  2                             
 Medication Administration Record                                       [facility]                                       Emergency Department       
                          [address] [place] [address]                  
                 Phone #: [phone] ext- [phone]                              
             2020 13:45                         
----------------------------------------------------                            
         Patient: ODILIA SANTIAGO                                   MRN: [id number]
      Acct#: [id number]                                 Sex: F : 1995 
Age: 24Tameka Han R.N.          Rate: 150 mL/hr over 4 hour(s)----            
              Dispensed: 1000 mL bagContinued Upon Disposition    Site: #1 right
 AC17:45 2020Tameka Calix R.N.  









          Name      Value     Range     Interpretation Code Description Data Karlie

rce(s) Supporting 

Document(s)

 

                                                                       









                    ID                  Date                Data Source

 

                    [id number]      2020 01:45:00 PM EDT [facility]

 

                                                                                

                                        

 1                                      General Instructions                    
                 [facility]                                     
Emergency Department                               [address] [place] [address]                                 Phone #: [phone] ext- [phone]      
                                   2020 13:45                       
----------------------------------------------------                            
       Patient: ODILIA SANTIAGO                                 MRN: [id number]    
  Acct#: [id number]                               Sex: F : 1995 Age: 
24ySevere hyperemesis gravidarum less than 21 weeks with dehydration ( 
8w2d).(Electronically signed by Av Davis M.D. 2020 18:24)  









          Name      Value     Range     Interpretation Code Description Data [facility](s) Supporting 

Document(s)

 

                                                                       









                    ID                  Date                Data Source

 

                    [id number]      2020 01:45:00 PM EDT [facility]

 

                                                                                

                                        

                            1                                    Clinical Report
 - Nurses                                    [facility]             
                       Emergency Department                              [address] [place] [address]                                Phone #: [phone] ext- [phone]                                        2020 13:45      
                ----------------------------------------------------            
                      Patient: ODILIA SANTIAGO                                
MRN: [id number]      Acct#: [id number]                              Sex: F : 
1995 Age: 24yTRIAGEArrived by EMS. Historian: patient. 
Unaccompanied.Triage time: late entry - 14:11 2020. Acuity: LEVEL 4.Chief 
Complaint: (vomiting).Alert. No acute distress.This started today. ( Pt states 
she is 8.5 weeks pregnant and has a history of hyperemesis; Pt hasbeen seen in 
this ER mutliple times. Pt states she has been vomiting today, 7-8 times today, 
and recentlyvomited a small amount of blood, pt believes from throat irritation.
 Pt does c/o headache, Right sided pelvicpain and dizziness).Treatment 
PTA:(Zofran last dose at 0900 and vomited after;).SEPSIS SCREEN: SIRS Screen 
negative. (14:20 2020). --14:20 20 Sonia Castillo R.N.14:13 
20. BP: 130/77. MAP: 94. HR: 71. RR: 18. O2 saturation: 98% on room air. 
Temp: 97.8 F.Pain level now: 7/10. --14:20 20 Sonia Castillo R.N.Weight:
 69.8 kg stated. Height/Length: 66 inches Per Patient. BMI: 24.8. --14:10 
20 Sonia Castillo R.N.MedicationsPhenergan (Promethazine) Rectal, as 
needed. --14:15 20 Sonia Castillo R.N. Zofran ODT Oral, as needed. 
--14:15 20 Sonia Castillo R.N. Vitamin B-6 Oral, as needed. --14:15 
20 Sonia Castillo R.N. Unisom Sleepgels Oral, as needed. --14:15 20
 Sonia Castillo R.N.AllergiesNone. --14:15 20 Sonia Castillo R.N.Medication/allergy information source: the patient. --14:20 20 Sonia Castillo R.N.ADDITIONAL SURGERIES:C- sections.Cellulitus. (X2). 
--14:16 20 Sonia Castillo R.N.HistoryPAST MEDICAL HX: Immunizations: 
up-to-date. Last normal menstrual period- End of may/beginning of               
                                                                                
                                                    Clinical Report - Nurses   
                                  [facility]                        
             Emergency Department                               [address] [place] [address]                                 Phone #: [phone]
[phone] ext- [phone]                                         2020 13:45         
              ----------------------------------------------------              
                     Patient: ODILIA SANTIAGO                                 
MRN: [id number]      Acct#: [id number]                               Sex: F : 
1995 Age: 24y 2020. Confirmed pregnancy. In 1st trimester. Pregnancy 
confirmed with sonogram. Has had prenatal care by obstetrician. OB history: G 4;
 P 2; Ab 1. SOCIAL HX: Former smoker. No alcohol use or drug use. She was 
offered HIV testing but declined. Patient education was provided. She was 
offered hepatitis C testing but declined. Patient education was provided. ( 
COVID screen negative). She has not traveled outside the [place] Infectious disease
 exposure: No infectious disease exposure. Patient is not a known carrier of 
tuberculosis, hepatitis, HIV, MRSA or VRE. Patient is not a known carrier of 
CRE. SELF HARM ASSESSMENT: Self harm assessment was performed. The patient 
answered "no" to the question(s) "Do you have thoughts of harming or killing 
yourself?" and "Do you have a plan for harming or killing yourself?". ABUSE 
ASSESSMENT: Abuse assessment. The patient had positive responses to the 
question(s) "Do you feel safe in your home?". Abuse denied. No suspicion of 
abuse. No report of abuse. NUTRITIONAL RISK ASSESSMENT: The nutritional risk 
assessment revealed no deficiencies. FUNCTIONAL ASSESSMENT: Functional 
assessment: no impairments noted. LEARNING NEEDS ASSESSMENT: The learning needs 
assessment revealed no barriers. FALL RISK ASSESSMENT: Fall risk assessment 
completed. No risk factors identified. SKIN INTEGRITY ASSESSMENT: Skin integrity
 risk assessment completed. No skin integrity risk identified. --14:20 20 
Sonia Castillo R.N. Interventions Identification band on patient. --14:20 
20 Sonia Castillo R.N.PHYSICAL ASSESSMENTGENERAL / NEURO / PSYCH: Alert.
 Oriented X 4.RESPIRATORY: Respirations not labored. Breath sounds within normal
 limits.CVS: Normal heart rate and rhythm. Capillary refill less than 2 
seconds.GI / : Emesis noted. Abdomen soft. Abdominal tenderness in the left 
upper quadrant. Bowel soundswithin normal limits.EXTREMITIES: No lower extremity
 edema.SKIN: Skin is warm and dry. --14:24 20 Tameka Calix R.N.NURSING 
PROGRESS NOTESNIBP monitor and pulse oximeter placed on patient; monitor alarms 
on. Patient gowned. Reassurancegiven. Three patient identifiers checked. Call 
light placed in reach. Side rails up x 2. Bed placed inlowest position. Brakes 
of bed on. Patient ready for evaluation- ED physician and PA notified. 
--14: Sonia Castillo R.N.                                           
                                                                   3            
                      Clinical Report - Nurses                                  
 [facility]                                   Emergency Department  
                           [address] [place] [address]                 
              Phone #: [phone] ext- [phone]                                 
      2020 13:45                     
----------------------------------------------------                            
     Patient: ODILIA SANTIAGO                               MRN: [id number]      
Acct#: [id number]                             Sex: F : 1995 Age: 24y14:25
 2020 Site #1 started via IV in the right antecubital space with an 20g 
angiocath, with aseptictechnique and good blood return; one attempt. --14:20 Tameka Calix R.N.14:29 2020 Started bag #1 1000 mL IV Fluids IV NS;
 bolus of 1000 mL over 1 hour(s) via site #1 viaIV pump. Allergies verified and 
confirmed 5 rights. IV patency established. IV site checked: no pain,redness, or
 swelling. IV flushed thoroughly pre- and post-medication administration. 
Information reviewedwith patient including reason for taking this medication, 
signs of allergic reaction and precautions.Verbalizes understanding. --14:29 
20 Tameka Calix R.N.14:30 2020 Started 25 mg of Phenergan IVPB in bag 
#1 50 mL; at 200 mL/hr over 15 minute(s) viasite #1. via IV pump. Allergies 
verified and confirmed 5 rights. IV patency established. IV site checked: 
nopain, redness, or swelling. IV flushed thoroughly pre- and post-medication 
administration. Informationreviewed with patient including reason for taking 
this medication, signs of allergic reaction and precautions.Verbalizes 
understanding. --14:30 20 Tameka Calix R.N.( pt notified of need for urine 
sample pt states she cannot void at present). --14:31 20 Tameka Calix R.N.Patient transported to sonogram by wheelchair with tech. --14:54 
20 Tameka Calix R.N.14:50 20. BP: 130/74. MAP: 92. HR: 85. RR: 18. O2 
saturation: 99%. --14:56 20 Tameka Calix R.N.Patient returned from radiology 
by wheelchair with tech. --15:31 20 Tameka Calix R.N.( pt states she does 
not feel any better). --15:32 20 Tameka Calix R.N.15:32 20. BP: 
127/68. MAP: 87. HR: 62. RR: 18. O2 saturation: 100%. --15:32 20 Tameka Calix R.N.15:48 2020 IV Fluids IV NS via IV site #1 Discontinued: 
completed. Total amount infused: 1000 mL.IV patency established. IV site 
checked: no pain, redness, or swelling. IV flushed thoroughly. --15:4820 
Tameka Calix R.N.15:48 2020 Phenergan IVPB via IV site #1 Discontinued: 
completed. Total amount infused: 50 mL.--15:48 20 Tameka Calix R.N.15:49 
2020 Started bag #1 1000 mL IV Fluids IV NS; bolus of 1000 mL over 1 
hour(s) via site #1 viaIV pump. Allergies verified and confirmed 5 rights. IV 
patency established. IV site checked: no pain,redness, or swelling. IV flushed 
thoroughly pre- and post-medication administration. Information reviewedwith 
patient including reason for taking this medication, signs of allergic reaction 
and precautions.Verbalizes understanding. --15:49 20 Tameka Calix R.N.Patient ID band checked for patient name and birthdate: patient confirmed. 
Instructions provided to collect                                                
                                                              4                 
                 Clinical Report - Nurses                                  
[facility]                                  Emergency Department    
                        [address] [place] [address]                    
          Phone #: [phone] ext- [phone]                                     
 2020 13:45                    
----------------------------------------------------                            
    Patient: ODILIA SANTIAGO                              MRN: [id number]      
Acct#: [id number]                            Sex: F : 1995 Age: 24yclean 
catch urine and patient verbalized understanding. Clean catch urine collected; 
sample sent to lab forurinalysis. Specimen labeled in the presence of the 
patient. --15:51 20 Tameka Calix R.N.16:11 2020 Reglan (Metoclopramide
 HCl) IVP 10 mg given over 2 minute(s) via site #1. Allergiesverified and 
confirmed 5 rights. IV patency established. IV site checked: no pain, redness, 
or swelling. IVflushed thoroughly pre- and post-medication administration. IVP 
given by RN. Information reviewed withpatient including reason for taking this 
medication, signs of allergic reaction and precautions. Verbalizesunderstanding.
 --16:12 20 Tameka Calix R.N.16:33 2020 Started bag #1 1000 mL IV 
Fluids IV NS; bolus of 1000 mL over 1 hour(s) via site #1 viaIV pump. Allergies 
verified and confirmed 5 rights. IV patency established. IV site checked: no 
pain,redness, or swelling. IV flushed thoroughly pre- and post-medication 
administration. Information reviewedwith patient including reason for taking 
this medication, signs of allergic reaction and precautions.Verbalizes 
understanding. --16:33 20 Tameka Calix R.N.16:33 2020 IV Fluids IV NS 
via IV site #1 Discontinued: bag #2 infused. Total amount infused: 1000mL. IV 
patency established. IV site checked: no pain, redness, or swelling. IV flushed 
thoroughly. --16: Tameka Calix R.N.16:47 2020 Zofran (Ondansetron 
HCl) IVP 4 mg given over 2 minute(s) via site #1. Allergies verifiedand 
confirmed 5 rights. IV patency established. IV site checked: no pain, redness, 
or swelling. IV flushedthoroughly pre- and post-medication administration. IVP 
given by RN. Information reviewed with patientincluding reason for taking this 
medication, signs of allergic reaction and precautions. Verbalizesunderstanding.
 --16:47 20 Tameka Calix R.N.16:47 2020 Started 20 mg of Pepcid IVPB 
in bag #1 50 mL; at 100 mL/hr over 30 minute(s) via site#1. via IV pump. 
Allergies verified and confirmed 5 rights. IV patency established. IV site 
checked: no pain,redness, or swelling. IV flushed thoroughly pre- and post-
medication administration. Information reviewedwith patient including reason for
 taking this medication, signs of allergic reaction and precautions.Verbalizes 
understanding. --16:47 20 Tameka Calix R.N.16:00 20. BP: 132/76. MAP: 
94. HR: 53. RR: 18. O2 saturation: 100%. --17:09 20 Tameka Calix R.N.17:00 
20. BP: 100/57. MAP: 71. HR: 73. RR: 18. O2 saturation: 100%. --17:09 
20 Tameka Calix R.N.( waiting on bed for admission). --17:20 Tameka Calix R.N.17:2020 IV Fluids IV NS via IV site #1 Discontinued: 
infused. Total amount infused: 1000 mL. IVpatency established. IV site checked: 
no pain, redness, or swelling. IV flushed thoroughly. --17:09 20Tameka Calix R.N.17:10 2020 Started bag #1 1000 mL IV Fluids D5NS IV; at 150 mL/hr 
over 4 hour(s) via site #1 via IV                                               
                                                                  5             
                        Clinical Report - Nurses                                
      [facility]                                      Emergency 
Department                                [address] [place] [address]  
                                Phone #: [phone] ext- [phone]               
                           2020 13:45                        
----------------------------------------------------                            
        Patient: ODILIA SANTIAGO                                  MRN: [id number]  
    Acct#: [id number]                                Sex: F : 1995 Age: [age]y pump. Allergies verified and confirmed 5 rights. IV patency established. IV 
site checked: no pain, redness, or swelling. IV flushed thoroughly pre- and 
post-medication administration. Information reviewed with patient including 
reason for taking this medication, signs of allergic reaction and precautions. 
Verbalizes understanding. --17:10 20 Tameka Calix R.N. 17:10 2020 
Pepcid IVPB via IV site #1 Discontinued: infused. Total amount infused: 50 mL. 
IV patency established. IV site checked: no pain, redness, or swelling. IV 
flushed thoroughly. --17:10 20 Tameka Calix R.N. 17:45 2020 IV Fluids 
D5NS IV via IV site #1 Continued: at the rate of 150 mL/hr. 950 mL remaining bag
 #4. IV patency established. IV site checked: no pain, redness, or swelling. IV 
flushed thoroughly. --18:03 20 Tameka Calix R.N.DISPOSITION / DISCHARGE 
17:45 20. Disposition: observation in the Acute Inpatient Unit for IV 
fluids. Transported via stretcher by nurse with IV. Report was given to a nurse 
in person and via visit overview. Report included information regarding 
patient's treatment, allergies and condition including: recent changes, current 
vital signs and abnormal labs. Report included treatment information regarding 
medications given or pending; type and amount of IV fluids and medications 
infusing. All questions were answered. Report was acknowledged and care was 
transferred. (Twilia). Bed obtained and ready. --18:02 20 Tameka Calix R.N. 
17:45 20. BP: 110/57. MAP: 74. HR: 74. RR: 18. O2 saturation: 100%. Temp: 
98.5 F. Pain level now: 5/10. --18:02 20 Tameka Calix R.N. Departure time: 
17:45 2020. --18:03 20 Tameka Calix R.N.Locked/Released at 2020 
18:04 by Tameka Calix R.N.  









          Name      Value     Range     Interpretation Code Description Data Karlie

rce(s) Supporting 

Document(s)

 

                                                                       









                    ID                  Date                Data Source

 

                    [id number]      2020 01:45:00 PM EDT [facility]

 

                                                                                

                                        

                             1                           Clinical Report - 
Physicians/Mid Levels                                       [facility]                                       Emergency Department             
                    [address] [place] [address]                        
           Phone #: [phone] ext- [phone]                                    
       2020 13:45                         
----------------------------------------------------                            
         Patient: ODILIA SANTIAGO                                   MRN: [id number]
      Acct#: [id number]                                 Sex: F : 1995 
Age: 24y Time Seen: 14:15 2020; initial patient contact. Arrived- By 
ambulance. Historian- patient. Disposition decision: 16:38 2020.HISTORY OF
 PRESENT ILLNESS Chief Complaint: VOMITING. No recent travel. She has had severe
 nausea and vomiting. The vomiting has occurred several times and mild, 
intermittent abdominal pain. The pain is described as located in the LLQ and 
associated with nausea and vomiting. No diarrhea, black stools, bloody stools, 
constipation or flank pain. No history of possible bad food exposure, known 
contact with a sick individual or change in routine. Has not recently been 
camping or on antibiotics. This started today and is still present. It was 
gradual in onset and has been constant. The illness is described as severe. 
Similar symptoms previously. Patient has had similar symptoms many times. Recent
 medical care: The patient was seen recently at this facility. ( on , , 
,  and  for hyperemesis gravidarum; pt has diclegis and phenergan 
and zofran and reglan at home; pt has OB US on 2020, showed IUP; pt saw her
 OB GYN on , given diclegis and phenergan supp.).REVIEW OF SYSTEMSNo fever, 
muscle aches, dark urine, headache or sore throat. No cough, chest pain, 
difficulty breathing,excessive urination or skin rash. No jaundice, back pain, 
fainting episodes or blurred vision. Currentlypregnant: LNMP:  EDC: 
2021 8 weeks In 1st trimester. Has had prenatal care in clinic.Recent son
ogram showed intrauterine pregnancy. G 3. P 2. The patient has had dizziness. 
All othersystems reviewed and are negative.PAST HISTORYSee nurses notes. 
Problems: Hyperemesis Gravidarum. Discomfort of Pregnancy. Pregnant. Additional 
Surgeries: C- sections. Cellulitus. . Elbow wash out.                  
                                                                                
            2                          Clinical Report - Physicians/Mid Levels  
                                   [facility]                       
              Emergency Department                               [address] [place] [address]                                 Phone #: [phone]
[phone]                                         2020 13:45         
              ----------------------------------------------------              
                     Patient: ODILIA SANTIAGO                                 
MRN: [id number]      Acct#: [id number]                               Sex: F : 
1995 Age: 24y Medications: Unisom Sleepgels Oral, as needed.  Vitamin B-6 
Oral, as needed.  Zofran ODT Oral, as needed.  Phenergan (Promethazine) Rectal, 
as needed. Allergies: None.SOCIAL HISTORYFormer smoker. No alcohol use or drug 
use.ADDITIONAL NOTESThe nursing notes have been reviewed with agreement 
regarding the chief complaint, HPI, ROS, PMH andpatient medications and 
allergies.PHYSICAL EXAMVital Signs: 2020 14:13 BP: 130/77. MAP: 94. HR: 
71. RR: 18. O2 saturation: 98% on room air.Temp: 97.8 F. Pain level now: 7/10. 
Have been reviewed. Oxygen saturation normal.Appearance: Alert. Oriented X3. No 
acute distress.Eyes: Pupils equal, round and reactive to light. Eyes normal 
inspection.ENT: Ears normal. Nose normal. Pharynx normal.Neck: Normal 
inspection. Neck supple.CVS: Normal heart rhythm and rate. Heart sounds normal. 
Pulses normal.Respiratory: No respiratory distress. Painless inspiration. Breath
 sounds normal.Abdomen: Soft. Mild tenderness in the left lower quadrant and 
lower abdomen. No guarding or reboundtenderness. Bowel sounds normal. No 
organomegaly. No mass. Femoral pulses equal.Back: Normal inspection. No CVA 
tenderness.Skin: Skin warm and dry. Normal skin color. No rash. Normal skin 
turgor.Extremities: Extremities exhibit normal ROM. No lower extremity 
edema.Neuro: Oriented X 3. No motor deficit. No sensory deficit. Reflexes 
normal.LABS, X-RAYS, AND EKGPelvic Sonogram: Normal study. A single gestation, 
viable intrauterine pregnancy (8w2d, edc 3-6-21, xvc407/min) is present. Study 
type: obstetrical evaluation. The study was interpreted by the radiologist.In
terpretation time: 16:04 2020.Laboratory Tests: Laboratory tests have been
 ordered, with results reviewed and considered in themedical decision making 
process.  OB 1ST TRI UP TO 14 WEEKS:   (GUERITA: 2020 14:25)              (
 [facility] In Progress  OB 1ST TRI UP TO 14 WEEKS Reason(s): 
left pelvic pain, N/V, EDC 3-5-21, 8 weeks pregnant TRANSPORTATION: WC          
            IV? IV?(Yes)    O2? Oxygen?(No)     Ro Pregnant: Yes           CMTS:
 do TV if needed CBC w Diff:   (GUERITA: 2020 14:37)          ( MsgRcvd 
2020 15:11) Final results                                                 
                                                               3                
         Clinical Report - Physicians/Mid Levels                                
   [facility]                                   Emergency Department
                             [address] [place] [address]               
                Phone #: [phone]                               
        2020 13:45                     
----------------------------------------------------                            
     Patient: ODILIA SANTIAGO                               MRN: [id number]      
Acct#: [id number]                             Sex: F : 1995 Age: 24y 
**Test**                                  **Result**      **Flag**    **Units** 
      **(Reference)** CBC W/AUTOMATED DIFF     COMPLETE BLOOD COUNT WBC         
                              8.1                         10/uL           (4.2 -
 11.0) RBC                                       5.34                        
10/uL           (4.20 - 5.40) HEMOGLOBIN                                14.2    
                    g/dL              (12.0 - 16.0) HEMATOCRIT                  
              43.6                        %                 (37.0 - 47.0) MCV   
                                    81.6                        fL              
  (81.0 - 101) MCH                                       26.6            L      
     pg                (27.0 - 34.0) MCHC                                      
32.6                        g/dL              (31.0 - 36.0) RDW                 
                      15.0            H           %                 (11.5 - 
14.5) PLATELETS                                 262                         
10/uL           (150 - 450) MPV                                       11.2      
      H           fL                (7.4 - 10.4) NEUT                           
           80.2            H           %                 (37.0 - 80.0) LYMPH    
                                 13.3            L           %                 
(25.0 - 40.0) MONO                                      5.5                     
    %                 (3.0 - 8.0) EOS                                       0.2 
                        %                 (0.0 - 7.0) BASO                      
                0.6                         %                 (0.0 - 2.5) %IG   
                                    0.2             H           %               
  (0.0 - 0.0) %NRBC                                     0.0                     
    %                 (0.0 - 0.0) #NEUT                                     6.47
                        10/uL           (2.00 - 6.90) #LYMPH                    
                1.07                        10/uL           (0.60 - 3.40) #MONO 
                                    0.44                        10/uL           
(0.00 - 0.90) #EOS                                      0.02                    
    10/uL           (0.00 - 0.70) #BASO                                     0.05
                        10/uL           (0.00 - 0.20) #IG                       
                0.02                        10/uL           (0.00 - 0.10) #NRBC 
                                    0.00                        10/uL           
(0.00 - 0.00) MANUAL DIFF                               NOT INDICATED RBC MORPH 
                                NOT INDICATEDCMP:   (GUERITA: 2020 14:37)    
       ( MsgRcvd 2020 15:32) Final results **Test**                       
           **Result**      **Flag**    **Units**       **(Reference)** 
COMPREHENSIVE METABOLIC PANEL     COMPREHENSIVE METABOLIC PANEL SODIUM          
                          135                         mEq/L            (134 - 
153) POTASSIUM                                 3.5              L          mEq/L
            (3.6 - 5.0) CHLORIDE                                  101           
              mEq/L            (98 - 107) CO2                                   
    14               L          MEQ/L            (22 - 30) GLUCOSE              
                     80                          MG/DL            (65 - 110) BUN
                                       6                L          MG/DL        
    (7 - 21) CREATININE                                0.6              L       
   MG/DL            (0.7 - 1.5) BUN/CREAT                                 10    
                                       (8 - 27) TOTAL PROTEIN                   
          7.6                         G/DL             (6.3 - 8.2) ALBUMIN      
                             4.7                         G/DL             (3.9 -
 5.0) GLOBULIN                                  2.9                         
GM/DL            (2.4 - 3.2) A/G RATIO                                 1.6      
                                    (0.8 - 2.0) CALCIUM                         
          9.6                          MG/DL           (8.4 - 10.2) TOTAL BILI  
                              2.2              H           MG/DL           (0.2 
- 1.3) ALKALINE PHOS                             77                           
U/L             (38 - 126) SGOT/AST                                  26         
                  U/L             (5 - 40) SGPT/ALT                             
     48                           U/L             (7 - 56) ANION GAP            
                     20.0             H           mmol/L          (8.0 - 16.0) 
AGE                                       24                           yrs NON-
AA GFR                                >60                          mL/min AFR 
AMER GFR                              >60                          mL/min       
                         Male GFR Interprentation                   20-49 yrs   
  >60 mL/min      Zgmgxd77-15 yrs     >56 mL/min   Normal                   60-
69 yrs     >49 mL/min   Normal                     70-79yrs>42 mL/min   Normal  
                 80 and above >35 mL/min     Normal                     Female 
GFR                                                                             
                                     4                             Clinical 
Report - Physicians/Mid Levels                                         [facility]                                         Emergency Department      
                             [address] [place] [address]               
                      Phone #: [phone]                         
                    2020 13:45                           -----------------
-----------------------------------                                       
Patient: ODILIA SANTIAGO                                     MRN: [id number]      
[facility]#: [id number]                                   Sex: F : 1995 Age: 
24y  Interpretation                  20-39 yrs     >60 mL/min   Normal          
       40-49 yrs     >58 mL/min  Normal                  50-59 yrs     >51 
mL/min   Normal                 60-69 yrs     >45 mL/min   Normal  70-79 yrs    
 >39 mL/min   Normal                  80 and above >32 mL/min   Normal  Lipase: 
   (GUERITA: 2020 14:37)              ( [facility] 15:[facility]) Final 
results     **Test**                                 **Result**      **Flag**   
  **Units**      **(Reference)**     LIPASE                                   15
                           U/L             (13 - 60)  Urinalysis:    (GUERITA: 
2020 15:45)          ( [facility] 16:[facility]) Final results     
**Test**                                 **Result**      **Flag**     **Units** 
     **(Reference)**     URINALYSIS         URINALYSIS     SOURCE               
                    R      COLOR                                   alonso        
                                (NORMAL: Yello      CLARITY                     
            clear                                        (NORMAL: Clear      
SPEC GRAVITY                            1.025                                   
     (1.001 - 1.030      pH                                      5              
                              (5 - 9)      GLUCOSE                              
   NORM                                         (NORMAL: Negat      BILIRUBIN   
                            NEG                                          
(NORMAL: Negat      KETONE                                  150             A   
                         (NORMAL: Negat      PROTEIN                            
     30                                           (NORMAL: Negat      NITRITE   
                              NEG                                          
(NORMAL: Negat      BLOOD                                   NEG                 
                         (NORMAL: Negat      LEUK EST                           
     NEG                                          (NORMAL: Negat      
UROBILINOGEN                            4                                       
     (less than 1.0      MICROSCOPIC                             Not Indicate  
Beta-HCG, Quant Serum:     (GUERITA: 2020 14:37)             ( [facility] 
2020 15:47) Final results     **Test**                                 
**Result**       **Flag**    **Units**      **(Reference)**     HCG QUANT       
                         028792.0                     mIU/mL                    
                   Interpretation:                            Less than 5 mU/mL:
 Negative  6-10 mU/mL: Borderline (suggest repeat in 48 hours)                  
                >10: Positive  Approx HCG range (mU/mL) Weeks post LMP          
                  5.4-708 mU/mL 3-4 Weeks  217-98812 mU/mL 5-6 Weeks            
                4059-770551 mU/mL 7-8 Weeks  71902-257780 mU/mL 9-10 Weeks      
                      66236-65241 mU/mL 12-14 Weeks  53693-41190 mU/mL 15-16 Wee
ks                            8240-72426 mU/mL 17-18 Weeks.PROGRESS AND 
PROCEDURESCourse of Care: 16:36 20. workup all in and reviewed, as well as
 pelvic US which shows [id number]; this is the pt's 6th ER visit in 2 weeks, wants
 to be admitted; will admit; case discussed w Teofilo Morrison,hospitalist, who agrees. 
 Critical care performed (60 minutes). Time is exclusive of separately billable 
procedures. Time includes:  direct patient care, patient reassessment, 
coordination of patient care, interpretation of data (laboratory  data), medical
 consultation and documentation of patient care- see progress notes.  Patient 
counseled in person regarding the patient's stable condition, test results, 
diagnosis and need for  follow-up. Patient agrees with plan of care.            
                                                                                
       5                          Clinical Report - Physicians/Mid Levels       
                               [facility]                           
           Emergency Department                                [address] [place] [address]                                  Phone #: [phone]                                          2020 13:45             
           ----------------------------------------------------                 
                   Patient: ODILIA SANTIAGO                                  MR
N: [id number]      Acct#: [id number]                                Sex: F : 
1995 Age: 24y Disposition: Condition: good and stable. Admit decision 
based on need for further evaluation, observation, IV hydration and medications 
and stabilization of condition.CLINICAL IMPRESSION Severe hyperemesis gravidarum
 less than 21 weeks with dehydration ( 8w2d).(Electronically signed by 
Av Davis M.D. 2020 18:24)  









          Name      Value     Range     Interpretation Code Description Data Karlie

rce(s) Supporting 

Document(s)

 

                                                                       









                    ID                  Date                Data Source

 

                    [id number]     2020 04:06:00 PM EDT [facility]









          Name      Value     Range     Interpretation Code Description Data Karlie

rce(s) Supporting 

Document(s)

 

          URINALYSIS                                         [facility]

te  

 

                                            URINALYSIS 

 

          [facility]    [facility]

[facility]  

 

          COLOR     alonso     NORMAL: Yellow                     [facility]

ospital  

 

          CLARITY   clear     NORMAL: Clear                     [facility]

spital  

 

           Specific gravity of Urine by Test strip 1.025      1.001 - 1.030     

                  [facility]                                 

 

          pH        5         5 - 9                         [facility]

[facility]  

 

           Glucose [Mass/volume] in Urine by Test strip NORM       NORMAL: Negat

[facility]                            

 

           Bilirubin.total [Presence] in Urine by Test strip NEG        NORMAL: 

Negative                       

[facility]                   

 

           Ketones [Presence] in Urine by Test strip 150        NORMAL: Negative

 A                     [facility]                                

 

           Protein [Mass/volume] in Urine by Test strip 30         NORMAL: Negat

[facility]                            

 

           Nitrite [Presence] in Urine by Test strip NEG        NORMAL: Negative

                       [facility]                                

 

          BLOOD     NEG       NORMAL: Negative                     [facility]  

 

           Leukocyte esterase [Presence] in Urine by Test strip NEG        CAITY

L: Negative                       

[facility]                   

 

           Urobilinogen [Mass/volume] in Urine by Test strip 4          less radha

n 1.0 mg/dL                       

[facility]                   

 

          MICROSCOPIC Not Indicate                               [facility]

ospital  









                    ID                  Date                Data Source

 

                    [id number]     2020 03:47:00 PM EDT [facility]









          Name      Value     Range     Interpretation Code Description Data Karlie

rce(s) Supporting 

Document(s)

 

             Choriogonadotropin.intact [Units/volume] in Serum or Plasma 018133.

0 mIU/mL                           

                          [facility]     

 

                                                                         Interpr

etation:                           Less 

than 5 mU/mL: Negative                 6-10 mU/mL: Borderline (suggest repeat in
 48 hours)                                 >10: Positive                     
Approx HCG range (mU/mL) Weeks post LMP                           5.4-708 mU/mL 
 3-4 Weeks                           217-82797 mU/mL 5-6 Weeks                  
         4059-486976 mU/mL 7-8 Weeks                           33425-855736 
mU/mL 9-10 Weeks                           95628-15522 mU/mL 12-14 Weeks        
                   19900-52960 mU/mL 15-16 Weeks                           8240-
98572 mU/mL 17-18 Weeks 









                    ID                  Date                Data Source

 

                    [id number]     2020 03:32:00 PM EDT [facility]









          Name      Value     Range     Interpretation Code Description Data Karlie

rce(s) Supporting 

Document(s)

 

          COMPREHENSIVE METABOLIC PANEL                                         

[facility]  

 

                                            COMPREHENSIVE METABOLIC PANEL 

 

           Sodium [Moles/volume] in Serum or Plasma 135 mEq/L  134 - 153        

                [facility]                                 

 

           Potassium [Moles/volume] in Serum or Plasma 3.5 mEq/L  3.6 - 5.0  L  

                   [facility]                            

 

           Chloride [Moles/volume] in Serum or Plasma 101 mEq/L  98 - 107       

                  [facility]                                 

 

           Carbon dioxide, total [Moles/volume] in Serum or Plasma 14 MEQ/L   22

 - 30    L                     

[facility]                   

 

           Glucose [Mass/volume] in Serum or Plasma 80 MG/DL   65 - 110         

                [facility]                                 

 

          BUN       6 MG/DL   7 - 21    L                   [facility]

al  

 

           Creatinine [Mass/volume] in Serum or Plasma 0.6 MG/DL  0.7 - 1.5  L  

                   [facility]                            

 

          BUN/CREAT 10        8 - 27                        [facility]  

 

           Protein [Mass/volume] in Serum or Plasma 7.6 G/DL   6.3 - 8.2        

                [facility]                                 

 

           Albumin [Mass/volume] in Serum or Plasma 4.7 G/DL   3.9 - 5.0        

                [facility]                                 

 

           Globulin [Mass/volume] in Serum by calculation 2.9 GM/DL  2.4 - 3.2  

                      [facility]                            

 

          A/G RATIO 1.6       0.8 - 2.0                     [facility]  

 

           Calcium [Mass/volume] in Serum or Plasma 9.6 MG/DL  8.4 - 10.2       

                [facility]                                 

 

           Bilirubin.total [Mass/volume] in Serum or Plasma 2.2 MG/DL  0.2 - 1.3

  H                     

[facility]                   

 

                    Alkaline phosphatase [Enzymatic activity/volume] in Serum or

 Plasma 77 U/L              38 - 

126                                             [facility]  

 

                          Aspartate aminotransferase [Enzymatic activity/volume]

 in Serum or Plasma 26 U/L

             5 - 40                                 [facility]  

 

                    Alanine aminotransferase [Enzymatic activity/volume] in Seru

m or Plasma 48 U/L              7

- 56                                            [facility]  

 

           Anion gap 3 in Serum or Plasma 20.0 mmol/L 8.0 - 16.0 H              

       [facility]

                                         

 

          AGE       24 yrs                                  [facility]

[facility]  

 

          NON-AA GFR >60 mL/min                               [facility]

ital  

 

          AFR AMER GFR >60 mL/min                               [facility]

spital  

 

                                                                     Male GFR In

terprentation                  20-49 yrs

    >60 mL/min   Normal                  50-59 yrs     >56 mL/min   Normal      
           60-69 yrs     >49 mL/min   Normal                  70-79yrs      >42 
mL/min   Normal                  80 and above  >35 mL/min   Normal              
     Female GFR  Interpretation                  20-39 yrs     >60 mL/min   
Normal                  40-49 yrs     >58 mL/min   Normal                  50-59
yrs     >51 mL/min   Normal                  60-69 yrs     >45 mL/min   Normal  
               70-79 yrs     >39 mL/min   Normal                  80 and above  
>32 mL/min   Normal 









                    ID                  Date                Data Source

 

                    [id number]     2020 03:31:00 PM EDT [facility]









          Name      Value     Range     Interpretation Code Description Data Karlie

rce(s) Supporting 

Document(s)

 

           Lipase [Enzymatic activity/volume] in Serum or Plasma 15 U/L     13 -

 60                          

[facility]                   









                    ID                  Date                Data Source

 

                    [id number]     2020 03:11:00 PM EDT [facility]









          Name      Value     Range     Interpretation Code Description Data Karlie

rce(s) Supporting 

Document(s)

 

          CBC W/AUTOMATED DIFF                                         [facility]  

 

                                            COMPLETE BLOOD COUNT 

 

           Leukocytes [#/volume] in Blood by Automated count 8.1 10^3/uL 4.2 - 1

1.0                       

[facility]                   

 

             Erythrocytes [#/volume] in Blood by Automated count 5.34 10^6/uL 4.

20 - 5.40                

                          [facility]     

 

           Hemoglobin [Mass/volume] in Blood 14.2 g/dL  12.0 - 16.0             

          [facility]                                 

 

           Hematocrit [Volume Fraction] of Blood by Automated count 43.6 %     3

7.0 - 47.0                       

[facility]                   

 

                    Erythrocyte mean corpuscular volume [Entitic volume] by Auto

mated count 81.6 fL             

81.0 - 101                                      [facility]  

 

                          Erythrocyte mean corpuscular hemoglobin [Entitic mass]

 by Automated count 26.6 

pg           27.0 - 34.0  L                         [facility]  

 

                                        Erythrocyte mean corpuscular hemoglobin 

concentration [Mass/volume] by Automated

 count     32.6 g/dL  31.0 - 36.0                       [facility]  

 

             Erythrocyte distribution width [Ratio] by Automated count 15.0 %   

    11.5 - 14.5  H             

                          [facility]     

 

           Platelets [#/volume] in Blood by Automated count 262 10^3/uL 150 - 45

0                        

[facility]                   

 

                    Platelet mean volume [Entitic volume] in Blood by Automated 

count 11.2 fL             7.4 - 

10.4            H                               [facility]  

 

           Neutrophils/100 leukocytes in Blood by Automated count 80.2 %     37.

0 - 80.0 H                     

[facility]                   

 

           Lymphocytes/100 leukocytes in Blood by Manual count 13.3 %     25.0 -

 40.0 L                     

[facility]                   

 

           Monocytes/100 leukocytes in Blood by Automated count 5.5 %      3.0 -

 8.0                        

[facility]                   

 

           Eosinophils/100 leukocytes in Blood by Automated count 0.2 %      0.0

 - 7.0                        

[facility]                   

 

           Basophils/100 leukocytes in Blood by Automated count 0.6 %      0.0 -

 2.5                        

[facility]                   

 

          %IG       0.2 %     0.0 - 0.0 H                   [facility]

al  

 

          %NRBC     0.0 %     0.0 - 0.0                     [facility]

al  

 

           Neutrophils [#/volume] in Blood by Automated count 6.47 10^3/uL 2.00 

- 6.90                       

[facility]                   

 

           Lymphocytes [#/volume] in Blood by Automated count 1.07 10^3/uL 0.60 

- 3.40                       

[facility]                   

 

           Monocytes [#/volume] in Blood by Automated count 0.44 10^3/uL 0.00 - 

0.90                       

[facility]                   

 

           Eosinophils [#/volume] in Blood by Automated count 0.02 10^3/uL 0.00 

- 0.70                       

[facility]                   

 

           Basophils [#/volume] in Blood by Automated count 0.05 10^3/uL 0.00 - 

0.20                       

[facility]                   

 

          #IG       0.02 10^3/uL 0.00 - 0.10                     [facility]

ospital  

 

          #NRBC     0.00 10^3/uL 0.00 - 0.00                     [facility]

ospital  

 

          MANUAL DIFF NOT INDICATED                               [facility]  

 

          RBC MORPH NOT INDICATED                               [facility]

spital  









                    ID                  Date                Data Source

 

                    [id number]      2020 08:12:00 AM EDT [facility]

 

                                                                                

                                        

                 1                                         OrderSheet           
                         [facility]                                 
   Emergency Department                              [address] [place] [address]                                Phone #: [phone] ext- [phone]      
                                  2020 08:08                      
----------------------------------------------------                            
      Patient: ODILIA ASNTIAGO                                MRN: [id number]      
Acct#: [id number]                              Sex: F : 1995 Age: 
24yWEIGHT:69.8 kg (S) HEIGHT:66 inches (S) BMI:24.8ALLERGIES: NoneCHIEF 
COMPLAINT: abd crampsDIAGNOSIS: Hyperemesis gravidarumLAB ORDERSOrder 
Description      Priority      Entered              Acknowledged        
InitialedUrinalysis (Clean      STAT          08:32 2020                  
       10:08 Mabel Galvin RN                                    Physician;CBC w Diff 
            STAT          08:32 2020                         08:34 Tameka Calix R.N.                                    Physician;CMP                    STAT 
         08:32 2020                         08:34 Tameka Calix R.N.             
                       Physician;DIAGNOSTIC STUDY ORDERSOrder Description  
Priority          Entered              Acknowledged       
InitialedMEDICATION/IV/DRIP/FLUID ORDERSOrder Description    Priority        
Entered              Acknowledged        InitialedIV NS : Bolus 1000            
       08:31 2020                         08:34 Lv Calix, then 250 
mL/hr                  Kalen Baxter                            R.NJt(can be 
titrated per                Physician;additionalphysicianinstruction)Zofran 4 mg
 IVP X 1                  08:31 2020                         08:35 Harman Calixose: 8 mg (NOW                     Kalen Webbrus                          
  R.N.x1, NOW)                            Physician;Phenergan 25 mg             
         09:04 2020                         09:17 Jodie Calix 50 mL NS, 
give                   Kalen Castanedas                            R.N.wide open: 
25 mg                    Physician;(NOW x1, HIGHALERTMEDICATION)                
                                                                                
   2                                           OrderSheet                       
              [facility]                                     
Emergency Department                               [address] [place] [address]                                 Phone #: [phone] ext- [phone]      
                                   2020 08:08                       ------
----------------------------------------------                                  
 Patient: ODILIA SANTIAGO                                 MRN: [id number]      
Acct#: [id number]                               Sex: F : 1995 Age: 
24yGENERAL ORDERSOrder Description       Priority      Entered                
Acknowledged      InitialedPulse oximeter                        08:32 
2020                         08:34 Tameka Calix(Spot Check)                 
        Kalen Baxter R.N.                           
          Physician;Saline Lock                           08:32 2020      
                   08:34 Tameka Calix R.N.                                     
Physician;[Electronically signed by Tameka Calix R.N. (12:58 
2020)][Electronically signed by Kalen Baxter Physician (08:54 
2020)][Electronically locked by Tameka Calix R.N. (12:58 2020)]  









          Name      Value     Range     Interpretation Code Description Data Karlie

rce(s) Supporting 

Document(s)

 

                                                                       









                    ID                  Date                Data Source

 

                    [id number]      2020 08:12:00 AM EDT [facility]

 

                                                                                

                                        

               1                           Medication Reconciliation Report     
                             [facility]                             
     Emergency Department                             [address] [place] [address]                               Phone #: [phone] ext- [phone]                                       2020 08:08                     
----------------------------------------------------                            
    Patient: ODILIA SANTIAGO                               MRN: [id number]      
Acct#: [id number]                             Sex: F : 1995 Age: 
24yWeight:      69.8 kgHeight/Length:      66 in.BMI:         24.8ALLERGIES: 
NoneThe patient's Home Medications are listed below:THE FOLLOWING MEDICATIONS 
NEED TO BE RECONCILED:   Phenergan (Promethazine) Rectal, prnThe source(s) of 
the original Home Medication information:Not obtained.The following Medications 
were given to the patient in the Emergency Department:IV NS IV Fluids bolus 1000
mL over 1 hour(s), administered: 2020 8:34:00 AMZofran [IVP] IVP 8 mg, 
administered: 2020 8:35:00 AMPHENERGAN [IVP] IVP 25 mg, administered: 
2020 9:17:00 AMThe following Medications were prescribed to the 
patient:Diclegis 10 mg-10 mg tablet,delayed release Take 2 tablet at bedtime as 
needed for 10 days -- fornausea / vomiting / hyperemesis. Dispense 20 tablet. 
Refills: 0. Substitution permitted.Pharmacy - [address] ; [place] [address]. Phone: [phone] FaxNumber: [phone]
[phone]. -- Kalen Baxter, Physician  









          Name      Value     Range     Interpretation Code Description Data Karlie

rce(s) Supporting 

Document(s)

 

                                                                       









                    ID                  Date                Data Source

 

                    [id number]      2020 08:12:00 AM EDT [facility]

 

                                                                                

                                        

           1                        Medication Administration Record            
                     [facility]                                 
Emergency Department                           [address] [place] [address]                             Phone #: [phone] ext- [phone]             
                        2020 08:08                   
----------------------------------------------------                            
   Patient: ODILIA SANTIAGO                             MRN: [id number]      Acct#:
 [id number]                           Sex: F : 1995 Age: 24yWeight: 69.8 
kgHeight/Length: 66 inBMI:    24.8ALLERGIES:      None      Date/Time           
   Medication Administered           Medication OrderedStart                   
IV NS                              IV NS : Bolus 1000 mL, then 92409:34 
2020        Dose: IV Fluids                    mL/hr (can be titrated 
Tameka Melvin RJEREMI    Bolus: 1000 mL over 1 hour(s)      additional physician 
instruction)----                    Dispensed: 1000 mL bagStop                  
  Site: #1 right AC10:18 2020Peshima Galvin RNGiven                   ZOFRAN
 [IVP] (ONDANSETRON HCL)     Zofran 4 mg IVP X 1 dose: 8 mg08:35 2020     
   Dose: 8 mg IVP                     (NOW x1, NOW)Tameka Calix R.N.    Site: #1 
right ACGiven                   PHENERGAN [IVP] (PROMETHAZINE      Phenergan 25 
mg in 50 mL NS,09:17 2020        HCL)                               give 
wide open: 25 mg (Yogi Montano Julie, R.N.    Dose: 25 mg IVP                    
HIGH ALERT MEDICATION)                    Site: #1 right AC  









          Name      Value     Range     Interpretation Code Description Data Karlie

rce(s) Supporting 

Document(s)

 

                                                                       









                    ID                  Date                Data Source

 

                    [id number]      2020 08:12:00 AM EDT [facility]

 

                                                                                

                                        

                             1                                       General 
Instructions                                        [facility]      
                                  Emergency Department                          
        [address] [place] [address]                                    
Phone #: [phone] ext- [phone]                                            
2020 08:08                          
----------------------------------------------------                            
          Patient: ODILIA SANTIAGO                                    MRN: 
[id number]      Acct#: [id number]                                  Sex: F : 
1995 Age: 24yModerate hyperemesis gravidarum less than 21 weeks with 
volume depletion.INSTRUCTIONSDrink plenty of fluids.Warnings: GENERAL WARNINGS: 
Return or contact your physician immediately if your conditionworsens or changes
 unexpectedly, if not improving as expected, or if other problems 
arise.Prescription Medications:Diclegis 10 mg-10 mg tablet,delayed release Take 
2 tablet at bedtime as needed for 10 days -- fornausea / vomiting / hyperemesis.
 Dispense 20 tablet. Refills: 0. Substitution permitted.Pharmacy - [facility] 
[facility] - [address] [facility] ; [place] [address]. Phone: [phone] 
FaxNumber: [phone].Follow-up:Follow up with a gynecologist and an 
obstetrician if not better. Call for an appointment. Reason for 
referral:evaluation, treatment and Hyperemesis gravidarum.Understanding of the 
discharge instructions verbalized by patient.                                   
  ADDITIONAL INFORMATIONHyperemesis GravidarumHyperemesis gravidarum is a severe
 form of morning sickness that can affect some women duringpregnancy. It may 
develop around the 5th week and last until the 16th week of pregnancy. In 
somewomen, it may last longer. Symptoms include severe nausea and vomiting. This
 can lead to problemssuch as weight loss and dehydration.It's not clear what 
causes hyperemesis gravidarum. It may be from rising hormone levels early in 
thepregnancy. It can be a serious threat to mother and fetus if symptoms are 
severe. Follow the advicebelow carefully. If your symptoms don't get better with
 home care measures, you may need to stay inthe hospital. In the hospital, you 
may get IV (intravenous) fluids and medicines.Home care                         
                                                                                
                                      2                                         
          General Instructions                                             
[facility]                                             Emergency 
Department                                       [address] [place] [address]                                         Phone #: [phone] ext- [phone] 
                                                2020 08:08                
               ----------------------------------------------------             
                              Patient: ODILIA SANTIGAO                         
                MRN: [id number]      Acct#: [id number]                                
       Sex: F : 1995 Age: 24yDiet     Keep a log of the foods you eat 
and how they affect your symptoms. Don't eat foods that      trigger your 
symptoms.      Eat small meals often throughout the day rather than 3 large 
meals. This can help keep your      stomach from being empty. An empty stomach 
can make nausea worse.      Choose foods that are high in carbohydrates. Eating 
foods high in protein may also help. Limit      greasy or spicy foods.      
Before getting out of bed in the morning, try eating crackers or dry toast. This
 may help settle      your stomach.      Drink cold, clear liquids. Drinking 
small amounts of liquids with electrolytes, such as sports      drinks, may help
 as well.Medicine     If needed, your healthcare provider may prescribe certain 
medicines to help ease nausea and      vomiting. Your provider may suggest 
vitamin B6 and ginger. Don't try any over-the-counter      medicines or home 
remedies without talking with your provider first.Follow-up careFollow up with 
your healthcare provider, or as advised.When to seek medical adviceCall your 
healthcare provider right away if any of these occur:      Signs of dehydration.
 These include dry mouth, extreme thirst, dark urine or little urine output,    
  dizziness, weakness, or fainting.       Vomiting that won't stop       
Inability to keep down liquids       Frequent diarrhea       Weight loss or no 
weight gain over a 2-week period       Severe constant pain in the lower right 
abdomen       Fever of 100.4F (38C) or higher, or as directed by your healthcare
 provider 1305-0309 [employer]. [address] [place] [address]. All rights reserved. This information is not intended as asubstitute 
for professional medical care. Always follow your healthcare professional's 
instructions.                                                                   
             3                                     General Instructions         
                           [facility]                               
     Emergency Department                              [address] [place] [address]                                Phone #: [phone] ext- [phone]                                        2020 08:08                    
  ----------------------------------------------------                          
        Patient: ODILIA SANTIAGO                                MRN: [id number]    
  Acct#: [id number]                              Sex: F : 1995 Age: 
24yYou have been given the following additional information:Hyperemesis 
Gravidarum(Electronically signed by Kalen Baxter, Physician 2020 08:54)  









          Name      Value     Range     Interpretation Code Description Data Karlie

rce(s) Supporting 

Document(s)

 

                                                                       









                    ID                  Date                Data Source

 

                    [id number]      2020 08:12:00 AM EDT [facility]

 

                                                                                

                                        

                            1                                   Clinical Report 
- Nurses                                    [facility]              
                      Emergency Department                              [address] [place] [address]                                Phone #: [phone] ext- [phone]                                        2020 08:08      
                ----------------------------------------------------            
                      Patient: ODILIA SANTIAGO                                
MRN: [id number]      Acct#: [id number]                              Sex: F : 
1995 Age: 24yTRIAGEArrived by private vehicle. Historian: patient. 
Accompanied by family. ( 8 weeks pregnant and hasnausea and vomit was told to 
come in by OB).Acuity: LEVEL 3.Chief Complaint: ABDOMINAL PAIN, NAUSEA and 
VOMITING.Alert. No acute distress.Onset. (constant since for weeks). The patient
 has had abdominal pain. The pain is described as locatedin the central area of 
the abdomen.Treatment PTA:None.SEPSIS SCREEN: SIRS Screen negative. Sepsis 
Screen negative. No suspected or confirmed signs ofinfection present. --08:16 
20 Tameka Calix R.N.08:10 20. BP: 117/70. MAP: 85. HR: 60. RR: 18. O2 
saturation: 100%. Temp: 98.4 F (temporal).Pain level now: 4/10. --08:16 20 
Tameka Calix R.N.Weight: 69.8 kg stated. Height/Length: 66 inches Per Patient. 
BMI: 24.8. --08:10 20 Tameka Calix R.N.MedicationsPhenergan (Promethazine) 
Rectal, as needed. --08:12 20 Tameka Calix R.N.AllergiesNone. --08:11 
20 Tameka Calix R.N.PROBLEMS:Hyperemesis 
Gravidarum.Dehydration.Hypokalemia.Psoriasis.Pregnant. --08:13 20 Tameka Calix R.N.ADDITIONAL SURGERIES:Cellulitus. (X2).Elbow wash out. 
--08:13 20 Tameka Calix R.N.History                                         
                                                                        2       
                              Clinical Report - Nurses                          
           [facility]                                     Emergency 
Department                               [address] [place] [address]   
                              Phone #: [phone] ext- [phone]                 
                        2020 08:08                       
----------------------------------------------------                            
       Patient: ODILIA SANTIAGO                                 MRN: [id number]    
  Acct#: [id number]                               Sex: F : 1995 Age: 24y 
PAST MEDICAL HX: Immunizations: up-to-date. Last normal menstrual period- end of
 may. Currently pregnant. Pregnancy confirmed with urine test and serum test. G 
4. P 2. SOCIAL HX: Never smoker. No alcohol use or drug use. No recent travel. 
No known contact with a sick individual. The patient was offered HIV testing but
 declined and hepatitis C testing but declined. The patient has not traveled 
outside the [place] Infectious disease exposure: No infectious disease exposure. 
Patient is not a known carrier of tuberculosis, hepatitis, HIV, MRSA or VRE. 
Patient is not a known carrier of CRE. SELF HARM ASSESSMENT: Self harm 
assessment was performed. The patient answered "no" to the question(s) "Have you
 recently felt down, depressed, or hopeless?", "Do you have thoughts of harming 
or killing yourself?", "Do you have a plan for harming or killing yourself?", 
"Have you recently had thoughts about harming or killing others?", "Do you have 
any dangerous items in your possession?", "Have you noticed less interest or 
pleasure in doing things?", "Are you here because you tried to hurt yourself?" 
and "Have you ever tried to hurt yourself before today?". ABUSE ASSESSMENT: 
Abuse assessment. Abuse denied. No suspicion of abuse. No report of abuse. 
NUTRITIONAL RISK ASSESSMENT: The nutritional risk assessment revealed no 
deficiencies. FUNCTIONAL ASSESSMENT: Functional assessment: no impairments 
noted. LEARNING NEEDS ASSESSMENT: The learning needs assessment revealed no 
barriers. FALL RISK ASSESSMENT: Fall risk assessment completed. No risk factors 
identified. SKIN INTEGRITY ASSESSMENT: Skin integrity risk assessment completed.
 No skin integrity risk identified. --08:16 20 Tameka Calix R.N. 
Interventions Identification band on patient. To treatment room. --08:20
 Tameka Calix R.N.PHYSICAL ASSESSMENTGENERAL / NEURO / PSYCH: Alert. Oriented X 
4. Appears in no acute distress.HEENT: Mucous membranes are pink.RESPIRATORY: R
espirations not labored. Breath sounds within normal limits.CVS: Normal sinus 
rhythm noted. Capillary refill less than 2 seconds.GI / : The patient has had 
nausea. Emesis noted. Abdomen soft. Abdominal tenderness in theperiumbilical 
area. Bowel sounds within normal limits.SKIN: Skin is warm and dry. --08:16 
20 Tameka Calix R.N.NURSING PROGRESS NOTESReassurance given. Two patient 
identifiers checked. Call light placed in reach. Side rails up x 2. Bedplaced in
 lowest position. Brakes of bed on. Patient ready for evaluation- ED physician 
notified. --08: Tameka Calix R.N.                                       
                                                                      3         
                         Clinical Report - Nurses                               
    [facility]                                   Emergency 
Department                             [address] [place] [address]     
                          Phone #: [phone] ext- [phone]                     
                  2020 08:08                     
----------------------------------------------------                            
     Patient: ODILIA SANTIAGO                               MRN: [id number]      
Acct#: [id number]                             Sex: F : 1995 Age: 24y08:29
 2020 Site #1 started via IV in the right antecubital space with an 20g 
angiocath, with aseptictechnique and good blood return; two attempts. --08:29 
20 Tameka Calix R.N.08:34 2020 Started bag #1 1000 mL IV Fluids IV NS;
 bolus of 1000 mL over 1 hour(s) via site #1 viaIV pump. Allergies verified and 
confirmed 5 rights. IV patency established. IV site checked: no pain,redness, or
 swelling. IV flushed thoroughly pre- and post-medication administration. 
Information reviewedwith patient including reason for taking this medication, 
signs of allergic reaction and precautions.Verbalizes understanding. --08:34 
20 Tameka Calix R.N.08:35 2020 Zofran (Ondansetron HCl) IVP 8 mg given
 over 2 minute(s) via site #1. Allergies verifiedand confirmed 5 rights. IV 
patency established. IV site checked: no pain, redness, or swelling. IV 
flushedthoroughly pre- and post-medication administration. IVP given by RN. 
Information reviewed with patientincluding reason for taking this medication, 
signs of allergic reaction and precautions. Verbalizesunderstanding. --08:35 
20 Tameka Calix R.N.( blanket given to pt, pt states she still has nausea, 
MD made aware). --08:57 20 Tameka Calix R.N.08:58 20. BP: 120/75. MAP:
 90. HR: 62. RR: 18. O2 saturation: 99%. --08:59 20 Tameka Calix R.N.09:17 
2020 PHENERGAN (Promethazine HCl) IVP 25 mg given over 15 minute(s) via 
site #1.Allergies verified and confirmed 5 rights. IV patency established. IV 
site checked: no pain, redness, orswelling. IV flushed thoroughly pre- and post-
medication administration. IVP given by RN. Informationreviewed with patient 
including reason for taking this medication, signs of allergic reaction, 
precautions andsedative warning. Verbalizes understanding. --09:17 20 Tameka Calix R.N.09:17 2020 IV Fluids IV NS via IV site #1 Bag Change: bag #1 
infused. Total amount infused: 1000.STARTED bag #2 (1000 mL) at 250 mL/hr via IV
 pump. Confirmed 5 rights. IV patency established. IV sitechecked: no pain, 
redness, or swelling. IV flushed thoroughly. --09:17 20 Tameka Calix R.N.( 
resting at present). --09:45 20 Tameka Calix R.N.09:44 20. BP: 118/76.
 MAP: 90. HR: 92. RR: 18. O2 saturation: 99%. --09:45 20 Tameka Calix R.N.( 
pt up ambulating to the bathroom to provide a sample, denies any pain or 
nausea). --10:09 20Waqar Galvin RN10:18 2020 IV Fluids IV NS via IV 
site #1 Discontinued: bag #2 STOPPED. Total amount infused:250 mL (pt states her
 IV hurts, stopped fluid and advised MD, he voices provide a sode to pt and if 
she canhold it down, she'll be discharged). --10:18 20 Waqar Galvin RN10:53
 2020 Site #1 removed (infiltrated). --10:53 20 Waqar Galvin RN      
                                                                                
                           4                                      Clinical 
Report - Nurses                                      [facility]     
                                 Emergency Department                           
     [address] [place] [address]                                  Phone
 #: [phone]- [phone]                                          2020
 08:08                        
----------------------------------------------------                            
        Patient: ODILIA SANTIAGO                                  MRN: [id number]  
    Acct#: [id number]                                Sex: F : 1995 Age: 
24y ( IV attempt unsuccessful x 3, MD made aware). --10:53 20 Waqar Galvin RN 11:06 2020 Site #2 started via IV in the left upper arm with an 20g 
angiocath, with aseptic technique and good blood return; one attempt. Blood 
drawn. Labeled in the presence of the patient and sent to the lab. Saline lock 
flushed with 10 mL saline. --11:06 20 Sonai Castillo R.N. ( IV fluids 
running well, pt states minimal nausea, will continue to infuse second liter of 
fluid). --11:25 20 Waqar Galvin RN 12:57 2020 Site #2 removed upon 
discharge. Pressure dressing applied. --12:57 20 Tameka Calix R.N.DISPOSITION / DISCHARGE Condition at departure: improved. No learning 
barriers present. Discharge instructions provided and reviewed with the patient.
 Reviewed medication(s) side effects, precautions, dosing and course 
information. Prescription(s) given to the patient. Medication(s) for home use 
given to the patient per protocol. Reviewed fever care and rest instructions. 
Patient verbalized understanding. Written instructions provided in English. The 
patient was discharged home and unaccompanied at time of discharge. She left 
ambulatory and via private vehicle. Patient driving. --12:58 20 Tameka Calix R.N. 12:57 20. BP: 122/74. MAP: 90. HR: 90. RR: 8. O2 saturation: 99%. 
Temp: 98.3 F (oral). Pain level now: 2/10. --12:58 20 Tameka Calix R.N. 
Departure time: 12:58 2020. --12:58 20 Tameka Calix R.N.Locked/Released at 2020 12:58 by Tameka Calix R.N.  









          Name      Value     Range     Interpretation Code Description Data Karlie

rce(s) Supporting 

Document(s)

 

                                                                       









                    ID                  Date                Data Source

 

                    [id number]      2020 08:12:00 AM EDT [facility]

 

                                                                                

                                        

                          1                          Clinical Report - 
Physicians/Mid Levels                                     [facility]
                                     Emergency Department                       
        [address] [place] [address]                                 
Phone #: [phone] ext- [phone]                                         
2020 08:08                       
----------------------------------------------------                            
       Patient: ODILIA SANTIAGO                                 MRN: [id number]    
  Acct#: [id number]                               Sex: F : 1995 Age: 24y 
Time Seen: 08:19 2020. Arrived- By private vehicle. Historian- patient. 
RETURN VISIT: recently seen in this ED. Seen now for the same problem as before.
 Disposition decision: 12:48 2020.HISTORY OF PRESENT ILLNESS Chief 
Complaint: ABDOMINAL CRAMPS Vomiting during pregnancy. This started several days
 ago; Pt. is about 8 weeks pregnant and has been having hyperemesis for over a 
week. She has had this in prior pregnancies also. and still present. It was abr
upt in onset and has been waxing/waning. The symptoms are described as moderate.
 The patient has had moderate, crampy abdominal pain. The pain is described as 
located in the upper abdomen and epigastrium. No pelvic pain, vaginal pain, low 
back pain, flank pain or irregular periods. No abnormal bleeding, vaginal 
discharge, vaginal itching, genital lesions or pain with urination. No urinary 
frequency, urgency of urination or hematuria. The patient has missed a period (8
 weeks pregnant). Does not use birth control measures. Currently pregnant. In 
1st trimester. Receiving prenatal care. Similar symptoms previously. Patient has
 had similar symptoms occasionally. ( Has had similar vomiting issues in prior 
pregnancies). Recent medical care: The patient was seen recently at this 
facility in the emergency department. ( Has been here in).REVIEW OF SYSTEMSThe 
patient has had nausea. She has had vomiting. No diarrhea, black stools, 
headache, fever or chills.No anorexia, eye discomfort, sore throat, cough or d
ifficulty breathing. No chest pain, skin rash, enlargedlymph nodes or joint 
pain.PAST HISTORYPast history not negative. See nurses notes. Other disease. ( 
 Ab1 - Currently 8 weekspregnantPsoriasis). Surgeries:  has been 
performed twice. (Elbow wash-out).SOCIAL HISTORYNever smoker. No alcohol use or 
drug use. No recent travel.ADDITIONAL NOTESThe nursing notes have been reviewed 
with agreement regarding the chief complaint, HPI, ROS, PMH andpatient 
medications and allergies.                                                      
                                                   2                            
Clinical Report - Physicians/Mid Levels                                     
[facility]                                     Emergency Department 
                              [address] [place] [address]              
                   Phone #: [phone] ext- [phone]                            
             2020 08:08                       -----------------
-----------------------------------                                   Patient: 
ODILIA SANTIAGO                                 MRN: [id number]      Acct#: 
[id number]                               Sex: F : 1995 Age: 24yPHYSICAL 
EXAMVital Signs: 2020 08:10 BP: 117/70. MAP: 85. HR: 60. RR: 18. O2 
saturation: 100%. Temp: 98.4 F.Pain level now: 10. Have been reviewed and 
appear to be correct. Blood pressure normal. Heart ratenormal. Respiratory rate 
normal. Temperature normal. Oxygen saturation normal.Appearance: Alert. Oriented
 X3. Anxious. Appears to be in pain. Patient in moderate distress. 
Indistress.HEENT: Normal external inspection.ENT: Pharynx abnormal. (Dry 
mouth).Neck: Neck supple.CVS: Heart sounds normal.Respiratory: No respiratory 
distress. Painless inspiration. Breath sounds normal. Chest nontender.Abdomen: 
Soft. Mild tenderness in the upper abdomen and epigastric area. Bowel sounds 
normal. Noorganomegaly. No mass. Tenderness present.Back: Normal external 
inspection.: (Deferred).Skin: Skin warm and dry. Normal skin color. No rash. 
Normal skin turgor.Extremities: Extremities nontender. No lower extremity 
edema.Neuro: Oriented X 3. Mood/affect normal. No motor deficit. No sensory 
deficit.LABS, X-RAYS, AND EKGLaboratory Tests: Laboratory tests have been 
ordered, with results reviewed and considered in themedical decision making 
process. Urinalysis:    (GUERITA: 2020 08:13)       ( [facility] 2020 
10:47) Final results     **Test**                              **Result**    
**Flag**    **Units**      **(Reference)**     URINALYSIS         URINALYSIS    
 SOURCE                                R      COLOR                             
   yellow                                    (NORMAL: Yello      CLARITY        
                      clear                                     (NORMAL: Clear  
    SPEC GRAVITY                         1.025                                  
   (1.001 - 1.030      pH                                   5                   
                      (5 - 9)      GLUCOSE                              NORM    
                                  (NORMAL: Negat      BILIRUBIN                 
           NEG                                       (NORMAL: Negat      KETONE 
                              150           A                           (NORMAL:
 Negat      PROTEIN                              15                             
           (NORMAL: Negat      NITRITE                              NEG         
                              (NORMAL: Negat      BLOOD                         
       NEG                                       (NORMAL: Negat      LEUK EST   
                          25                                        (NORMAL: 
Negat      UROBILINOGEN                         4                               
          (less than 1.0      MICROSCOPIC                          See Below    
  WBC                                  1 - 3                                    
 (NORMAL:   NONE      RBC                                  0 - 1                
                     (NORMAL:   NONE      EPITHELIAL                           
FEW                                       (NORMAL:   NONE      BACTERIA         
                    Trace                                     (NORMAL:   NONE   
   MUCOUS                               1+                                      
  (NORMAL:   NONE CBC w Diff:    (GUERITA: 2020 08:46)       ( [facility]  09:07) Final results     **Test**                              
**Result**    **Flag**    **Units**      **(Reference)**     CBC W/AUTOMATED 
DIFF                                                                            
                                    3                        Clinical Report - 
Physicians/Mid Levels                                   [facility]  
                                 Emergency Department                           
  [address] [place] [address]                               Phone #: 
[phone] ext- [phone]                                       2020 08:08 
                    ----------------------------------------------------        
                         Patient: ODILIA SANTIAGO                              
 MRN: [id number]      Acct#: [id number]                             Sex: F : 
1995 Age: 24y      COMPLETE BLOOD COUNT WBC                               
        5.9                           10/uL         (4.2 - 11.0) RBC            
                           4.57                          10/uL         (4.20 - 
5.40) HEMOGLOBIN                                12.0                          
g/dL            (12.0 - 16.0) HEMATOCRIT                                37.3    
                      %               (37.0 - 47.0) MCV                         
              81.6                          fL              (81.0 - 101) MCH    
                                   26.3             L            pg             
 (27.0 - 34.0) MCHC                                      32.2                   
       g/dL            (31.0 - 36.0) RDW                                       
15.1             H            %               (11.5 - 14.5) PLATELETS           
                      233                           10/uL         (150 - 450) 
MPV                                       11.2             H            fL      
        (7.4 - 10.4) NEUT                                      68.8             
             %               (37.0 - 80.0) LYMPH                                
     20.8             L            %               (25.0 - 40.0) MONO           
                           7.3                           %               (3.0 - 
8.0) EOS                                       2.0                           %  
             (0.0 - 7.0) BASO                                      0.8          
                 %               (0.0 - 2.5) %IG                                
       0.3              H            %               (0.0 - 0.0) %NRBC          
                           0.0                           %               (0.0 - 
0.0) #NEUT                                     4.07                          
10/uL         (2.00 - 6.90) #LYMPH                                    1.23      
                    10/uL         (0.60 - 3.40) #MONO                           
          0.43                          10/uL         (0.00 - 0.90) #EOS        
                              0.12                          10/uL         (0.00 
- 0.70) #BASO                                     0.05                          
10/uL         (0.00 - 0.20) #IG                                       0.02      
                    10/uL         (0.00 - 0.10) #NRBC                           
          0.00                          10/uL         (0.00 - 0.00) MANUAL DIFF 
                              NOT INDICATED RBC MORPH                           
      NOT INDICATEDCMP:    (GUERITA: 2020 08:46)           ( MsgRcvd 
2020 09:27) Final results **Test**                                  
**Result**       **Flag**     **Units**      **(Reference)** COMPREHENSIVE METAB
OLIC PANEL     COMPREHENSIVE METABOLIC PANEL SODIUM                             
       139                          mEq/L             (134 - 153) POTASSIUM     
                            3.5              L           mEq/L             (3.6 
- 5.0) CHLORIDE                                  105                          
mEq/L             (98 - 107) CO2                                       21       
        L           MEQ/L             (22 - 30) GLUCOSE                         
          94                           MG/DL             (65 - 110) BUN         
                              6                L           MG/DL             (7 
- 21) CREATININE                                0.4              L           
MG/DL             (0.7 - 1.5) BUN/CREAT                                 15      
                                       (8 - 27) TOTAL PROTEIN                   
          6.6                          G/DL              (6.3 - 8.2) ALBUMIN    
                               4.2                          G/DL              
(3.9 - 5.0) GLOBULIN                                  2.4                       
   GM/DL             (2.4 - 3.2) A/G RATIO                                 1.8  
                                          (0.8 - 2.0) CALCIUM                   
                8.7                          MG/DL             (8.4 - 10.2) 
TOTAL BILI                                1.1                          MG/DL    
         (0.2 - 1.3) ALKALINE PHOS                             61               
            U/L               (38 - 126) SGOT/AST                               
   31                           U/L               (5 - 40) SGPT/ALT             
                     55                           U/L               (7 - 56) 
ANION GAP                                 13.0                         mmol/L   
         (8.0 - 16.0) AGE                                       24              
             yrs NON-AA GFR                                >60                  
        mL/min AFR AMER GFR                              >60                    
      mL/min                                Male GFR Interprentation            
       20-49 yrs       >60 mL/min    Uajjbe69-64 yrs     >56 mL/min   Normal    
               60-69 yrs     >49 mL/min    Normal                    70-79yrs>42
 mL/min   Normal                   80 and above >35 mL/min     Normal           
           Female GFRInterpretation                   20-39 yrs      >60 mL/min 
  Normal                    40-49 yrs      >58 mL/minNormal                   
50-59 yrs     >51 mL/min    Normal                   60-69 yrs      >45 mL/min  
  Coxrsn70-41 yrs     >39 mL/min    Normal                   80 and above >32 
mL/min     Normal                                                               
                                                  4                             
Clinical Report - Physicians/Mid Levels                                         
[facility]                                         Emergency 
Department                                   [address] [place] [address]                                     Phone #: [phone] ext- [phone]     
                                        2020 08:08                        
   ----------------------------------------------------                         
              Patient: ODILIA SANTIAGO                                     MRN:
 [id number]      Acct#: [id number]                                   Sex: F : 
1995 Age: 24y.PROGRESS AND PROCEDURESCourse of Care: 11:. 
Patient is stable. Symptoms better.  11:. Pt. is getting 2nd liter
 of saline now and will ultimately be discharged with an Rx for  Diclegis. Will 
run in fluid at 500cc/Hr and monitor progress.  Critical care performed (120 
minutes). Time is exclusive of separately billable procedures. Time includes:  
direct patient care, patient reassessment, interpretation of data (laboratory 
data and pulse oximetry),  review of patient's medical records and documentation
 of patient care- see progress notes. Procedures  included in critical care 
time: peripheral IV placement and phlebotomy- see progress notes.  Disposition: 
Discharged home in good and improved condition (12:48 2020). Condition: 
good.CLINICAL IMPRESSION Moderate hyperemesis gravidarum less than 21 weeks with
 volume depletion.INSTRUCTIONS Drink plenty of fluids.  Warnings: GENERAL 
WARNINGS: Return or contact your physician immediately if your condition  
worsens or changes unexpectedly, if not improving as expected, or if other 
problems arise.  Prescription Medications:  Diclegis 10 mg-10 mg tablet,delayed 
release Take 2 tablet at bedtime as needed for 10 days -- for  nausea / vomiting
 / hyperemesis. Dispense 20 tablet. Refills: 0. Substitution permitted.  
Pharmacy - [address] ; [place] [address]. 
Phone: [phone] FaxNumber: [phone].  Follow-up:  Follow up with a 
gynecologist and an obstetrician if not better. Call for an appointment. Reason 
for referral:  evaluation, treatment and Hyperemesis gravidarum.  Understanding 
of the discharge instructions verbalized by patient.                            
                                                    5                         
Clinical Report - Physicians/Mid Levels                                     
[facility]                                     Emergency Department 
                              [address] [place] [address]              
                   Phone #: [phone] ext- [phone]                            
             2020 08:08                       
----------------------------------------------------                            
       Patient: ODILIA SANTIAGO                                 MRN: [id number]    
  [id number]                               Sex: F : 1995 Age: 
24y(Electronically signed by Kalen Baxter, Physician 2020 08:54)  









          Name      Value     Range     Interpretation Code Description Data Karlie

rce(s) Supporting 

Document(s)

 

                                                                       









                    ID                  Date                Data Source

 

                    [id number]     2020 09:27:00 AM EDT [facility]









          Name      Value     Range     Interpretation Code Description Data Karlie

rce(s) Supporting 

Document(s)

 

          COMPREHENSIVE METABOLIC PANEL                                         

[facility]  

 

                                            COMPREHENSIVE METABOLIC PANEL 

 

           Sodium [Moles/volume] in Serum or Plasma 139 mEq/L  134 - 153        

                [facility]                                 

 

           Potassium [Moles/volume] in Serum or Plasma 3.5 mEq/L  3.6 - 5.0  L  

                   [facility]                            

 

           Chloride [Moles/volume] in Serum or Plasma 105 mEq/L  98 - 107       

                  [facility]                                 

 

           Carbon dioxide, total [Moles/volume] in Serum or Plasma 21 MEQ/L   22

 - 30    L                     

[facility]                   

 

           Glucose [Mass/volume] in Serum or Plasma 94 MG/DL   65 - 110         

                [facility]                                 

 

          BUN       6 MG/DL   7 - 21    L                   [facility]

al  

 

           Creatinine [Mass/volume] in Serum or Plasma 0.4 MG/DL  0.7 - 1.5  L  

                   [facility]                            

 

          BUN/CREAT 15        8 - 27                        [facility]

al  

 

           Protein [Mass/volume] in Serum or Plasma 6.6 G/DL   6.3 - 8.2        

                [facility]                                 

 

           Albumin [Mass/volume] in Serum or Plasma 4.2 G/DL   3.9 - 5.0        

                [facility]                                 

 

           Globulin [Mass/volume] in Serum by calculation 2.4 GM/DL  2.4 - 3.2  

                      [facility]                            

 

          A/G RATIO 1.8       0.8 - 2.0                     [facility]  

 

           Calcium [Mass/volume] in Serum or Plasma 8.7 MG/DL  8.4 - 10.2       

                [facility]                                 

 

           Bilirubin.total [Mass/volume] in Serum or Plasma 1.1 MG/DL  0.2 - 1.3

                        

[facility]                   

 

                    Alkaline phosphatase [Enzymatic activity/volume] in Serum or

 Plasma 61 U/L              38 - 

126                                             [facility]  

 

                          Aspartate aminotransferase [Enzymatic activity/volume]

 in Serum or Plasma 31 U/L

             5 - 40                                 [facility]  

 

                    Alanine aminotransferase [Enzymatic activity/volume] in Seru

m or Plasma 55 U/L              7

- 56                                            [facility]  

 

           Anion gap 3 in Serum or Plasma 13.0 mmol/L 8.0 - 16.0                

       [facility]

                                         

 

          AGE       24 yrs                                  [facility]

al  

 

          NON-AA GFR >60 mL/min                               [facility]

ital  

 

          AFR AMER GFR >60 mL/min                               [facility]

spital  

 

                                                                     Male GFR In

terprentation                  20-49 yrs

    >60 mL/min   Normal                  50-59 yrs     >56 mL/min   Normal      
           60-69 yrs     >49 mL/min   Normal                  70-79yrs      >42 
mL/min   Normal                  80 and above  >35 mL/min   Normal              
     Female GFR  Interpretation                  20-39 yrs     >60 mL/min   
Normal                  40-49 yrs     >58 mL/min   Normal                  50-59
yrs     >51 mL/min   Normal                  60-69 yrs     >45 mL/min   Normal  
               70-79 yrs     >39 mL/min   Normal                  80 and above  
>32 mL/min   Normal 









                    ID                  Date                Data Source

 

                    [id number]     2020 09:06:00 AM EDT [facility]









          Name      Value     Range     Interpretation Code Description Data Karlie

rce(s) Supporting 

Document(s)

 

          CBC W/AUTOMATED DIFF                                         [facility]  

 

                                            COMPLETE BLOOD COUNT 

 

           Leukocytes [#/volume] in Blood by Automated count 5.9 10^3/uL 4.2 - 1

1.0                       

[facility]                   

 

             Erythrocytes [#/volume] in Blood by Automated count 4.57 10^6/uL 4.

20 - 5.40                

                          [facility]     

 

           Hemoglobin [Mass/volume] in Blood 12.0 g/dL  12.0 - 16.0             

          [facility]                                 

 

           Hematocrit [Volume Fraction] of Blood by Automated count 37.3 %     3

7.0 - 47.0                       

[facility]                   

 

                    Erythrocyte mean corpuscular volume [Entitic volume] by Auto

mated count 81.6 fL             

81.0 - 101                                      [facility]  

 

                          Erythrocyte mean corpuscular hemoglobin [Entitic mass]

 by Automated count 26.3 

pg           27.0 - 34.0  L                         [facility]  

 

                                        Erythrocyte mean corpuscular hemoglobin 

concentration [Mass/volume] by Automated

 count     32.2 g/dL  31.0 - 36.0                       [facility]  

 

             Erythrocyte distribution width [Ratio] by Automated count 15.1 %   

    11.5 - 14.5  H             

                          [facility]     

 

           Platelets [#/volume] in Blood by Automated count 233 10^3/uL 150 - 45

0                        

[facility]                   

 

                    Platelet mean volume [Entitic volume] in Blood by Automated 

count 11.2 fL             7.4 - 

10.4            H                               [facility]  

 

           Neutrophils/100 leukocytes in Blood by Automated count 68.8 %     37.

0 - 80.0                       

[facility]                   

 

           Lymphocytes/100 leukocytes in Blood by Manual count 20.8 %     25.0 -

 40.0 L                     

[facility]                   

 

           Monocytes/100 leukocytes in Blood by Automated count 7.3 %      3.0 -

 8.0                        

[facility]                   

 

           Eosinophils/100 leukocytes in Blood by Automated count 2.0 %      0.0

 - 7.0                        

[facility]                   

 

           Basophils/100 leukocytes in Blood by Automated count 0.8 %      0.0 -

 2.5                        

[facility]                   

 

          %IG       0.3 %     0.0 - 0.0 H                   [facility]

al  

 

          %NRBC     0.0 %     0.0 - 0.0                     [facility]

al  

 

           Neutrophils [#/volume] in Blood by Automated count 4.07 10^3/uL 2.00 

- 6.90                       

[facility]                   

 

           Lymphocytes [#/volume] in Blood by Automated count 1.23 10^3/uL 0.60 

- 3.40                       

[facility]                   

 

           Monocytes [#/volume] in Blood by Automated count 0.43 10^3/uL 0.00 - 

0.90                       

[facility]                   

 

           Eosinophils [#/volume] in Blood by Automated count 0.12 10^3/uL 0.00 

- 0.70                       

[facility]                   

 

           Basophils [#/volume] in Blood by Automated count 0.05 10^3/uL 0.00 - 

0.20                       

[facility]                   

 

          #IG       0.02 10^3/uL 0.00 - 0.10                     [facility]

ospital  

 

          #NRBC     0.00 10^3/uL 0.00 - 0.00                     [facility]

ospital  

 

          MANUAL DIFF NOT INDICATED                               [facility]  

 

          RBC MORPH NOT INDICATED                               [facility]

spital  









                    ID                  Date                Data Source

 

                    [id number]     2020 10:46:00 AM EDT [facility]









          Name      Value     Range     Interpretation Code Description Data Karlie

rce(s) Supporting 

Document(s)

 

          [facility]                                         [facility]

[facility]  

 

                                            URINALYSIS 

 

          SOURCE    R                                       [facility]

[facility]  

 

          COLOR     yellow    NORMAL: Yellow                     [facility]

ospital  

 

          CLARITY   clear     NORMAL: Clear                     [facility]

spital  

 

           Specific gravity of Urine by Test strip 1.025      1.001 - 1.030     

                  [facility]                                 

 

          pH        5         5 - 9                         [facility]

al  

 

           Glucose [Mass/volume] in Urine by Test strip NORM       NORMAL: Negat

[facility]                            

 

           Bilirubin.total [Presence] in Urine by Test strip NEG        NORMAL: 

Negative                       

[facility]                   

 

           Ketones [Presence] in Urine by Test strip 150        NORMAL: Negative

 A                     [facility]                                

 

           Protein [Mass/volume] in Urine by Test strip 15         NORMAL: Negat

[facility]                            

 

           Nitrite [Presence] in Urine by Test strip NEG        NORMAL: Negative

                       [facility]                                

 

          BLOOD     NEG       NORMAL: Negative                     [facility]  

 

           Leukocyte esterase [Presence] in Urine by Test strip 25         CAITY

L: Negative                       

[facility]                   

 

           Urobilinogen [Mass/volume] in Urine by Test strip 4          less radha

n 1.0 mg/dL                       

[facility]                   

 

          MICROSCOPIC See Below                               [facility]

ital  

 

          WBC       1 - 3     NORMAL: NONE SEEN                     [facility]  

 

           Erythrocytes [#/volume] in Urine by Test strip 0 - 1      NORMAL: NON

E SEEN                       

[facility]                   

 

          EPITHELIAL FEW       NORMAL: NONE SEEN                     [facility]  

 

                    Bacteria [Presence] in Urine sediment by Light microscopy Tr

ace               NORMAL: NONE 

SEEN                                            [facility]  

 

           Mucus [Presence] in Urine sediment by Light microscopy 1+         NOR

MAL: NONE SEEN                       

[facility]                   









                    ID                  Date                Data Source

 

                    [id number]      2020 09:16:00 PM EDT [facility]

 

                                                                                

                                        

                    1                                          OrderSheet       
                            [facility]                              
     Emergency Department                             [address] [place] [address]                               Phone #: [phone]                                       2020 21:15                     
----------------------------------------------------                            
     Patient: ODILIA SANTIAGO                               MRN: [id number]      
Acct#: [id number]                             Sex: F : 1995 Age: 
24yWEIGHT:69.8 kg HEIGHT:66 inches BMI:24.8ALLERGIES: No Known Drug AllergyCHIEF
 COMPLAINT: vomitingDIAGNOSIS: Hyperemesis gravidarumLAB ORDERSOrder Description
     Priority      Entered              Acknowledged          InitialedCBC w 
Diff            STAT          21:46 2020                           22:56 
Zoss, April                                   Av Davis R.N., M.D.;CMP                   STAT 
         21:46 2020                           22:56 Zoss, April           
                        Av Davis R.N., M.D.;Urinalysis (Clean     STAT          21:46                            22:56 Zoss, AprilCatch)                         
    Av Davis R.N., M.D.;Lipase                STAT          21:46 2020     Cancelled: 
Physician Order 21:48 Lesa Davis Riccardo Riccardo M.D. M.D.;DIAGNOSTIC STUDY 
ORDERSOrder Description  Priority        Entered               Acknowledged     
     InitialedMEDICATION/IV/DRIP/FLUID ORDERSOrder Description    Priority      
 Entered              Acknowledged          InitialedNS IV 1000 mL              
         21:46 2020                           23:02 Connor,Bolus: : Bolus 
1000                Av Davis.N.mL 
(X1)                            M.D.;Reglan 10 mg IVP                    21:46 
2020                           23:04 Ryan,X1 dose: 10 mg                 
    Av Davis R.N.(NOW x1)              
             M.D.;Pepcid IVPB 20                      21:46 2020          
                 23:10 Ryan,mg/50mL (NOW x1,                   Av Davis R.N.Infuse over 30                     
M.D.;minutes.)Potassium Chloride                  23:09 2020     
Cancelled: Other 00:14 2020 Chris,Liquid PO 40 meq                   Av Campos R.N., M.D.;              
                                                                                
      2                                            OrderSheet                   
                  [facility]                                     
Emergency Department                               [address] [place] [address]                                 Phone #: [phone]      
                                   2020 21:15                       
----------------------------------------------------                            
       Patient: ODILIA SANTIAGO                                 MRN: [id number]    
  Acct#: [id number]                               Sex: F : 1995 Age: 
24yPhenergan 25 mg                        23:13 2020                      
  23:22 Connor,in 50 mL NS, give                     Av Davis R.N.wide open: 25 mg                      M.D.;(NOW x1, 
HIGHALERTMEDICATION)Potassium Chloride                     23:14 2020     
                   23:37 Chris, AprilIVPB 10                               
Av Davis R.N.meq/100mL                           
  M.D.;GENERAL ORDERSOrder Description        Priority      Entered             
   Acknowledged     InitialedNPO                                    21:46                         22:46 Lesa Ryan Riccardo Robert R.N. M.D.;[place]                            21:46 2020           
             22:56 Chris, April                                      Av Davis R.N., M.D.;[Electronically signed by Giuliana Perez R.N. (01:07 
2020)][Electronically signed by Av Davis M.D. (01:16 
2020)][Electronically locked by Giuliana Perez R.N. (01:2020)]  









          Name      Value     Range     Interpretation Code Description Data Karlie

rce(s) Supporting 

Document(s)

 

                                                                       









                    ID                  Date                Data Source

 

                    [id number]      2020 09:16:00 PM EDT [facility]

 

                                                                                

                                        

                    1                           Medication Reconciliation Report
                                  [facility]                        
          Emergency Department                             [address] [place] [address]                               Phone #: [phone]                                       2020 21:15                     
----------------------------------------------------                            
    Patient: ODILIA SANTIAGO                               MRN: [id number]      
Acct#: [id number]                             Sex: F : 1995 Age: 
24yWeight:      69.8 kgHeight/Length:      66 in.BMI:         24.8ALLERGIES: No 
Known Drug AllergyThe patient's Home Medications are listed below:CONTINUE 
TAKING THE FOLLOWING MEDICATIONS:   Promethazine HCl Oral, prnThe source(s) of 
the original Home Medication information:Not obtained.The following Medications 
were given to the patient in the Emergency Department:NS [IV] IV Fluids bolus 
1000 mL wide open, administered: 2020 11:01:00 PMReglan [IVP] IVP 10 mg, 
administered: 2020 11:04:00 PMPepcid [IVPB] IVPB bolus 0, then 20 mg 200 
mg/kg/hr, administered: 2020 11:10:00 PMPhenergan [IVPB] IVPB bolus 0, then
 25 mg 200 mL/hr, administered: 2020 11:22:00 PMPotassium Chloride [IVPB] 
IVPB bolus 0, then 10 meq 100 mL/hr, administered: 2020 11:30:00 PMThe 
following Medications were prescribed to the patient:None.  









          Name      Value     Range     Interpretation Code Description Data Karlie

rce(s) Supporting 

Document(s)

 

                                                                       









                    ID                  Date                Data Source

 

                    [id number]      2020 09:16:00 PM EDT [facility]

 

                                                                                

                                        

                    1                              Medication Administration 
Record                                      [facility]              
                       Emergency Department                                [address] [place] [address]                                  Phone #: [phone]                                          2020 21:15    
                   ----------------------------------------------------         
                          Patient: ODILIA SANTIAGO                             
    MRN: [id number]      Acct#: [id number]                                Sex: F : 
1995 Age: 24yWeight: 69.8 kgHeight/Length: 66 inBMI:    24.8ALLERGIES:    
  No Known Drug Allergy      Date/Time                    Medication 
Administered              Medication OrderedStart                         NS 
[IV]                                NS IV 1000 mL Bolus: : Bolus 540503:01 
2020              Dose: IV Fluids                        mL (X1)Ryan, 
Edgar, R.N.       Bolus: 1000 mL wide open----                          
Dispensed: 1000 mL bagStop                          Site: #1 left AC00:45 
2020Zobryce, April, RJEREMIGiven                         REGLAN [IVP] 
(METOCLOPRAMIDE           Reglan 10 mg IVP X1 dose: 10 mg23:04 2020       
       HCL)                                   (NOW x1)Edgar Ryan R.N.       
Dose: 10 mg IVP                          Site: #1 left ACStart                  
       PEPCID [IVPB]                          Pepcid IVPB 20 mg/50mL (NOW23:10 
2020              Dose: 20 mg IVPB                       x1, Infuse over 
30 minutes.)Edgar Ryan R.N.       Rate: 200 mg/kg/hr over 15 minute(s)----  
                        Dispensed: 50 mL bagStop                          Site: 
#1 left AC23:45 2020Zo, April, RJtNJtStart                         
PHENERGAN [IVPB]                       Phenergan 25 mg in 50 mL NS,23:22 
2020              Dose: 25 mg IVPB                       give wide open: 
25 mg (NOW x1,Edgar Ryan R.N.       Rate: 200 mL/hr over 15 minute(s)      
HIGH ALERT MEDICATION)----                          Dispensed: 50 mL bagStop    
                      Site: #1 left AC23:50 2020Zo, April, RJtNJtStart    
                     POTASSIUM CHLORIDE [IVPB]              Potassium Chloride 
IVPB 1023:30 2020              Dose: 10 meq IVPB                      
meq/100mLZoss, April, R.N.         Rate: 100 mL/hr over 1 hour(s)----           
               Dispensed: 100 mL bagStop                          Site: #1 left 
AC00:45 2020Zobryce, April, R.NJt  









          Name      Value     Range     Interpretation Code Description Data Karlie

rce(s) Supporting 

Document(s)

 

                                                                       









                    ID                  Date                Data Source

 

                    [id number]      2020 09:16:00 PM EDT [facility]

 

                                                                                

                                        

                              1                                      General 
Instructions                                     [facility]         
                            Emergency Department                               
[address] [place] [address]                                 Phone #: 
[phone]- [phone]                                         2020 
21:15                       ----------------------------------------------------
                                   Patient: ODILIA SANTIAGO                    
             MRN: [id number]      Acct#: [id number]                               Sex:
 F : 1995 Age: 24yModerate hyperemesis gravidarum less than 21 weeks 
with electrolyte imbalance. No volume depletion,dehydration or 
ketones.INSTRUCTIONSDrink plenty of fluids. Avoid alcohol and NSAIDS. NSAIDS 
include aspirin, ibuprofen (Advil) and naproxen(Aleve). Avoid fatty, fried/greas
y, lactose-containing (such as milk, cheese and ice cream), salty and 
spicyfoods. No alcohol. Do not smoke.(PLEASE FOLLOW UP WITH YOUR OB GYN TODAY 
REGARDING YOUR PERSISTENTHYPEREMESIS;PLEASE FILL THE DICLEGIS AND REGLAN SCRIPTS
 GIVEN TO YOU BY ME ON ).Warnings: Further evaluation is necessary. It is 
very important to follow up with a healthcare provider.GENERAL WARNINGS: Return 
or contact your physician immediately if your condition worsens orchanges 
unexpectedly, if not improving as expected, or if other problems arise. 
SPECIFICALLY, return ifyou develop pain in the abdomen or back, fever, vomiting,
 the inability to keep fluids down, blood invomitus, blood in diarrhea, 
fainting, lightheadedness or vaginal bleeding.Your Current Medications: Your 
current home medications have been reviewed.CONTINUE TAKING THE FOLLOWING 
MEDICATIONS:Promethazine HCl Oral : prn.Follow-up:Return to the emergency 
department as needed. Follow up with an obstetrician today even if well. Call 
[facility] appointment. Reason for referral: evaluation and treatment. Summary of 
care provided to patient viapaper.Understanding of the discharge instructions 
verbalized by patient. Expected course of illness, dischargeinstructions, 
activity level, diet, follow-up appointment and risks and benefits of treatment 
reviewed withpatient and understanding verbalized. Agrees to plan of care.      
                              ADDITIONAL INFORMATIONHyperemesis 
GravidarumHyperemesis gravidarum is a severe form of morning sickness that can 
affect some women duringpregnancy. It may develop around the 5th week and last 
until the 16th week of pregnancy. In some                                       
                                                                    2           
                         General Instructions                                   
[facility]                                   Emergency Department   
                          [address] [place] [address]                  
             Phone #: [phone]- [phone]                                  
     2020 21:15                     
----------------------------------------------------                            
     Patient: ODILIA SANTIAGO                               MRN: [id number]      
Acct#: [id number]                             Sex: F : 1995 Age: 
24ywomen, it may last longer. Symptoms include severe nausea and vomiting. This 
can lead to problemssuch as weight loss and dehydration.It's not clear what 
causes hyperemesis gravidarum. It may be from rising hormone levels early in 
thepregnancy. It can be a serious threat to mother and fetus if symptoms are 
severe. Follow the advicebelow carefully. If your symptoms don't get better with
 home care measures, you may need to stay inthe hospital. In the hospital, you 
may get IV (intravenous) fluids and medicines.Home careDiet   Keep a log of the 
foods you eat and how they affect your symptoms. Don't eat foods that    trigger
 your symptoms.    Eat small meals often throughout the day rather than 3 large 
meals. This can help keep your    stomach from being empty. An empty stomach can
 make nausea worse.    Choose foods that are high in carbohydrates. Eating foods
 high in protein may also help. Limit    greasy or spicy foods.    Before 
getting out of bed in the morning, try eating crackers or dry toast. This may 
help settle    your stomach.    Drink cold, clear liquids. Drinking small 
amounts of liquids with electrolytes, such as sports    drinks, may help as 
well.Medicine   If needed, your healthcare provider may prescribe certain 
medicines to help ease nausea and    vomiting. Your provider may suggest vitamin
 B6 and ginger. Don't try any over-the-counter    medicines or home remedies 
without talking with your provider first.Follow-up careFollow up with your 
healthcare provider, or as advised.When to seek medical adviceCall your 
healthcare provider right away if any of these occur:    Signs of dehydration. 
These include dry mouth, extreme thirst, dark urine or little urine output,    
dizziness, weakness, or fainting.    Vomiting that won't stop                   
                                                                                
                                            3                                   
                General Instructions                                            
 [facility]                                             Emergency 
Department                                       [address] [place] [address]                                         Phone #: [phone]- [phone] 
                                                2020 21:15                
               ----------------------------------------------------             
                              Patient: ODILIA SANTIAGO                         
                MRN: [id number]      Acct#: [id number]                                
       Sex: F : 1995 Age: 24y       Inability to keep down liquids     
  Frequent diarrhea       Weight loss or no weight gain over a 2-week period    
   Severe constant pain in the lower right abdomen       Fever of 100.4F (38C) 
or higher, or as directed by your healthcare provider 4643-2021 [employer]. [address] [place] [address], [place] [address]. All rights reserved. This 
information is not intended as asubstitute for professional medical care. Always
 follow your healthcare professional's instructions. You have been given the 
following additional information: Hyperemesis Gravidarum(Electronically signed 
by Av Davis M.D. 2020 01:16)  









          Name      Value     Range     Interpretation Code Description Data Karlie

rce(s) Supporting 

Document(s)

 

                                                                       









                    ID                  Date                Data Source

 

                    [id number]      2020 09:16:00 PM EDT [facility]

 

                                                                                

                                        

                              1                                    Clinical 
Report - Nurses                                    [facility]       
                             Emergency Department                              
[address] [place] [address]                                Phone #: 
[phone]- [phone]                                        2020 21:15
                      ----------------------------------------------------      
                            Patient: ODILIA SANTIAGO                           
     MRN: [id number]      Acct#: [id number]                              Sex: F : 
1995 Age: 24yTRIAGEArrived by private vehicle. Historian: patient. ( pt 
has been here for vomiting and nausea r/t pregnancy.pt is weeks pregnant now. pt
 saw OB this am was given promethazine and is not feeling any better 
ptrequesitng hydration. pt states he urine was bright orange, no clots p
resent.).Triage time: 21:17 2020. Acuity: LEVEL 4.Alert.Onset. (weeks). 
She has had vomiting. --21:20 Dacia Hamilton R.N.21:20. HR:
 68. RR: 16. O2 saturation: 97%. Temp: 98.5 F. Pain level now 10. --21:20Dacia Hamilton R.N.21:24 20. BP: 125/81.      --21:24 20 
Dacia Hamilton R.N.Chief Complaint: (hyperemesis). --01:06 20 Giuliana Perez R.N.Weight: 69.8 kg. Height/Length: 66 inches. BMI: 24.8. --21:20
 Dacia Hamilton R.N.MedicationsPromethazine HCl Oral, as needed. --21:20 Dacia Hamilton R.N.AllergiesNo Known Drug Allergy. --21:20 
Dacia Hamilton R.N.HistoryPAST MEDICAL HX: Immunizations: up-to-date. 
Currently pregnant: 8 weeks.SOCIAL HX: Never smoker. No alcohol use or drug use.
 She was offered HIV testing but declined. Graciehas not traveled outside the 
[place]Infectious disease exposure: No infectious disease exposure. The patient was
 not exposed to Coronavirus.Patient is not a known carrier of tuberculosis, 
hepatitis, HIV, MRSA or VRE. Patient is not a known carrierof CRE.SELF HARM 
ASSESSMENT: Self harm assessment was performed. The patient answered "no" to 
thequestion(s) "Have you recently felt down, depressed, or hopeless?", "Do you 
have thoughts of harming orkilling yourself?", "Do you have a plan for harming 
or killing yourself?", "Have you recently had thoughtsabout harming or killing 
others?", "Do you have any dangerous items in your possession?", "Have 
younoticed less interest or pleasure in doing things?", "Are you here because 
you tried to hurt yourself?" and"Have you ever tried to hurt yourself before 
today?".ABUSE ASSESSMENT: Abuse assessment. Abuse denied. No suspicion of abuse.
 No report of abuse.                                                            
                                                     2                          
           Clinical Report - Nurses                                     [facility]                                     Emergency Department         
                      [address] [place] [address]                      
           Phone #: [phone] ext- [phone]                                    
     2020 21:15                       
----------------------------------------------------                            
       Patient: ODILIA SANTIAGO                                 MRN: [id number]    
  Acct#: [id number]                               Sex: F : 1995 Age: 24y 
NUTRITIONAL RISK ASSESSMENT: The nutritional risk assessment revealed no 
deficiencies. FUNCTIONAL ASSESSMENT: Functional assessment: no impairments 
noted. LEARNING NEEDS ASSESSMENT: The learning needs assessment revealed no 
barriers. FALL RISK ASSESSMENT: Fall risk assessment completed. No risk factors 
identified. SKIN INTEGRITY ASSESSMENT: Skin integrity risk assessment completed.
 No skin integrity risk identified. --21:20 Dacia Hamilton R.N. 
FAMILY HX: No significant family medical history. --01:16 20 Av Davis M.D. Interventions Identification band on patient. To treatment room. 
--21:20 Dacia Hamilton R.N.PHYSICAL ASSESSMENTGENERAL / NEURO / 
PSYCH: Alert. Oriented X 4. Appears in no acute distress.HEENT: Mucous membranes
 are pink.RESPIRATORY: Respirations not labored.CVS: Capillary refill less than 
2 seconds.GI / : Emesis noted. (pta). Abdomen soft and nontender. Bowel sounds
 within normal limits.EXTREMITIES: No lower extremity edema.SKIN: Skin is warm 
and dry. --21:20 Giuliana Perez R.N.NURSING PROGRESS NOTESThree patient 
identifiers checked. --21:20 Dacia Hamilton R.N. Patient gowned. 
Head of bed elevated. Reassurance given. Two patient identifiers checked. Bed 
placed in lowest position. Brakes of bed on. Patient ready for evaluation- ED 
physician and PA notified. --21:20 Giuliana Perez R.N. 22:55 2020 
Site #2 started via IV in the left antecubital space with an 20g angiocath, with
 aseptic technique and good blood return; one attempt. Saline lock flushed with 
10 mL saline. --23:02 20 Edgar Ryan R.N. 22:57 2020 Site #1 
started via IV in the left antecubital space with an 20g angiocath, with aseptic
 technique and good blood return; one attempt. Saline lock flushed with 10 mL 
saline. --22:57 20 bryce, AprilAMY 23:01 2020 Started bag #1 1000 
mL IV Fluids NS; bolus of 1000 mL wide open via site #1 via IV pump. Allergies 
verified and confirmed 5 rights. IV patency established. IV site checked: no 
pain, redness, or swelling. IV flushed thoroughly pre- and post-medication 
administration. Information reviewed with                                       
                                                                        3       
                            Clinical Report - Nurses                            
       [facility]                                   Emergency 
Department                             [address] [place] [address]     
                          Phone #: [phone]- [phone]                     
                  2020 21:15                     
----------------------------------------------------                            
     Patient: ODILIA SANTIAGO                               MRN: [id number]      
Acct#: [id number]                             Sex: F : 1995 Age: 
24ypatient including reason for taking this medication, signs of allergic 
reaction and precautions. Verbalizesunderstanding. --23:02 20 Edgar Ryan R.N.23:04 2020 Reglan (Metoclopramide HCl) IVP 10 mg given over 2 
minute(s) via site #1. Allergiesverified and confirmed 5 rights. IV patency 
established. IV site checked: no pain, redness, or swelling. IVflushed 
thoroughly pre- and post-medication administration. IVP given by RN. Information
 reviewed withpatient including reason for taking this medication, signs of 
allergic reaction and precautions. Verbalizesunderstanding. --23:04 20 
Edgar Ryan R.N.23:10 2020 Started 20 mg of Pepcid IVPB in bag #1 50 
mL; at 200 mg/kg/hr over 15 minute(s) viasite #1. via IV pump. Allergies 
verified and confirmed 5 rights. IV patency established. IV site checked: 
nopain, redness, or swelling. IV flushed thoroughly pre- and post-medication 
administration. Informationreviewed with patient including reason for taking 
this medication, signs of allergic reaction and precautions.Verbalizes 
understanding. --23:10 20 Edgar Ryan R.N.23:22 2020 Started 25 mg
 of Phenergan IVPB in bag #1 50 mL; at 200 mL/hr over 15 minute(s) viasite #1. 
via IV pump. Allergies verified and confirmed 5 rights. IV patency established. 
IV site checked: nopain, redness, or swelling. IV flushed thoroughly pre- and 
post-medication administration. Informationreviewed with patient including 
reason for taking this medication, signs of allergic reaction and 
precautions.Verbalizes understanding. --23:22 20 Edgar Ryan R.N.23:30 
2020 Started 10 meq of Potassium Chloride IVPB in bag #1 100 mL; at 100 
mL/hr over 1hour(s) via site #1. via IV pump. Allergies verified and confirmed 5
 rights. IV patency established. IV sitechecked: no pain, redness, or swelling. 
IV flushed thoroughly pre- and post-medication administration.Information 
reviewed with patient including reason for taking this medication. Verbalizes 
understanding.--23:37 20 Giuliana Perez R.N.( pt resting, nad, phenergan 
complete, warm blankets given, lights dimmed, call light in reach. will cont 
tomonitor.). --23:37 20 Giuliana Perez R.N.00:11 20. BP: 102/64. MAP: 
76. HR: 54. RR: 18. O2 saturation: 99%. Pain level now: 0/10.--00:13 20 
Giuliana Perez R.N.( pt resting, nad, no vomiting since meds given. will cont to 
monitor.). --00:13 20 [facility], April, R.N.23:45 2020 Pepcid IVPB via IV 
site #1 Discontinued: bag #1 infused. Total amount infused: 50 mL.IV patency 
established. IV site checked: no pain, redness, or swelling. IV flushed 
thoroughly. --00:147 Zo, April, R.N.23:50 2020 Phenergan IVPB via 
IV site #1 Discontinued: bag #1 infused. Total amount infused: 50mL. IV patency 
established. IV site checked: no pain, redness, or swelling. IV flushed 
thoroughly. --00:157 , R.N.Reassessment acuity: LEVEL 3.       
                                                                                
                        4                                    Clinical Report - 
Nurses                                     [facility]               
                      Emergency Department                               [address] [place] [address]                                 Phone #: [phone] ext- [phone]                                         2020 21:15     
                  ----------------------------------------------------          
                         Patient: ODILIA SANTIAGO                              
   MRN: [id number]      Acct#: [id number]                               Sex: F : 
1995 Age: 24y GI / : The patient reports nausea. Abdomen soft and 
nontender. No vaginal bleeding. SKIN: Skin is warm and dry. Call light placed in
 reach. Side rails up x 2. Bed placed in lowest position. Brakes of bed on. 
--00:57 20 Edgar Ryan R.N. 00:55 20. BP: 122. HR: 83. RR: 16. O2 
saturation: 100%. Temp: 98.2 F. Pain level now: 0/10. --00:57 20 Edgar Ryan R.N. Correction. --00:58 20 Edgar Ryan R.N. 00:58 20. BP:
 122/83. MAP: 96. HR: 62. RR: 16. O2 saturation: 100%. Temp: 98.1 F. Pain level 
now: 0/10. --01:00 20 Edgar Ryan R.N.DISPOSITION / DISCHARGE 00:45 
2020 IV Fluids NS via IV site #1 Discontinued: bag #1 infused. Total 
amount infused: 1000 mL. IV patency established. IV site checked: no pain, 
redness, or swelling. IV flushed thoroughly. --01:06 20 Giuliana Perez R.N. 
00:45 2020 Potassium Chloride IVPB via IV site #1 Discontinued: bag #1 
infused. Total amount infused: 100 mL. IV patency established. IV site checked: 
no pain, redness, or swelling. IV flushed thoroughly. --01:06 20 Giuliana Perez R.NJt 01:05 2020 Site #1 removed upon discharge. Catheter intact. 
Bandaid applied. --01:05 20 Giuliana Peerz R.NJt 01:05 2020 Site #2 
removed upon discharge. Catheter intact. Bandaid applied (same iv as #1, charted
 twice.). --01:05 20 Giuliana Perez R.N. Condition at departure: improved and
 stable. No learning barriers present. Discharge instructions provided and 
reviewed with the patient. Follow up contact number OB. Patient verbalized 
understanding. Written instructions provided in English. The patient was 
discharged by the physician. She was discharged home. She left ambulatory and 
via private vehicle. Patient driving. --01:06 20 Giuliana Perez R.N. 01:04 
20. BP: 107/69. MAP: 81. HR: 53. RR: 18. O2 saturation: 99%. Temp: 98.2 F.
 Pain level now: 0/10. --01:06 20 Giuliana Perez R.N.Locked/Released at 
2020 01:07 by Giuliana Perez R.N.  









          Name      Value     Range     Interpretation Code Description Data Karlie

rce(s) Supporting 

Document(s)

 

                                                                       









                    ID                  Date                Data Source

 

                    [id number]      2020 09:16:00 PM EDT [facility]

 

                                                                                

                                        

                             1                           Clinical Report - 
Physicians/Mid Levels                                      [facility]                                      Emergency Department              
                  [address] [place] [address]                          
        Phone #: [phone] ext- [phone]                                       
   2020 21:15                        
----------------------------------------------------                            
        Patient: ODILIA SANTIAGO                                  MRN: [id number]  
    Acct#: [id number]                                Sex: F : 1995 Age: 
24y Time Seen: 21:37 2020; initial patient contact. Arrived- By private 
vehicle. Historian- patient. Disposition decision: 00:18 2020.HISTORY OF 
PRESENT ILLNESS Chief Complaint: VOMITING. No recent travel. She has had severe 
nausea and vomiting. The vomiting has occurred several times. No diarrhea, black
 stools, bloody stools, abdominal pain or constipation. No flank pain, history 
of possible bad food exposure, known contact with a sick individual or change in
 routine. Has not recently been camping or on antibiotics. This started 3 weeks 
ago and is still present. It has been intermittent. The illness is described as 
severe. (pt saw her OB this am and was given promethazine; did not get Diclegis 
and Reglan from -20). Similar symptoms previously. Patient has had similar 
symptoms many times. Recent medical care: The patient was seen recently at this 
facility. ( 20, 16 and 7-7 for same, hyperemesis gravidum; pt has US on 16
 showing IUP).REVIEW OF SYSTEMSNo fever, muscle aches, difficulty with 
urination, dark urine or headache. No dizziness, sore throat, cough,chest pain 
or difficulty breathing. No excessive urination, skin rash, jaundice, back pain 
or faintingepisodes. No blurred vision. Currently pregnant: LNMP: May 2020 EDC: 
2021 7w4d In 1sttrimester. Pregnancy confirmed with home test. Has had 
prenatal care in clinic. Recent sonogram showedintrauterine pregnancy. G 3. P 2.
 All other systems reviewed and are negative.PAST HISTORYSee nurses notes. 
Medications: Promethazine HCl Oral, as needed. Allergies: No Known Drug 
Allergy.SOCIAL HISTORYNever smoker. No alcohol use or drug use. No recent 
travel.FAMILY HISTORYNo significant family medical history.                     
                                                                                
        2                            Clinical Report - Physicians/Mid Levels    
                                   [facility]                       
                Emergency Department                                 [address] [place] [address]                                   Phone #: [phone]
[phone]                                           2020 21:15       
                  ----------------------------------------------------          
                           Patient: ODILIA SANTIAGO                            
       MRN: [id number]      Acct#: [id number]                                 Sex: F 
: 1995 Age: 24yADDITIONAL NOTESThe nursing notes have been reviewed 
with agreement regarding the chief complaint, HPI, ROS, PMH andpatient 
medications and allergies.PHYSICAL EXAMVital Signs: 2020 21:24 BP: 125/81.
 MAP: 95.2020 21:17 HR: 68. RR: 16. O2 saturation: 97%. Temp: 98.5 F. Have
 been reviewed. Oxygensaturation normal.Appearance: Alert. Oriented X3. No acute
 distress.Eyes: Pupils equal, round and reactive to light. Eyes normal 
inspection.ENT: Ears normal. Nose normal. Pharynx normal.Neck: Normal 
inspection. Neck supple.CVS: Normal heart rate and rhythm. Heart sounds normal. 
Pulses normal.Respiratory: No respiratory distress. Painless inspiration. Breath
 sounds normal.Abdomen: Soft and nontender. Bowel sounds normal. No 
organomegaly. No mass. Femoral pulsesequal.Back: Normal inspection.Skin: Skin 
warm and dry. Normal skin color. No rash. Normal skin turgor.Extremities: 
Extremities exhibit normal ROM. No lower extremity edema.Neuro: Oriented X 3. No
 motor deficit. No sensory deficit. Reflexes normal.LABS, X-RAYS, AND 
EKGLaboratory Tests: Laboratory tests have been ordered, with results reviewed 
and considered in themedical decision making process. CBC w Diff:      (GUERITA: 
2020 21:57)          ( MsgRcvd 2020 22:11) Final results     
**Test**                                **Result**       **Flag**    **Units**  
    **(Reference)**     CBC W/AUTOMATED DIFF         COMPLETE BLOOD COUNT     
WBC                                     7.5                          10/uL      
   (4.2 - 11.0)     RBC                                     4.81                
         10/uL         (4.20 - 5.40)     HEMOGLOBIN                             
 12.6                         g/dL            (12.0 - 16.0)     HEMATOCRIT      
                        39.1                         %               (37.0 - 
47.0)     MCV                                     81.3                         
fL              (81.0 - 101)     MCH                                     26.2   
          L           pg              (27.0 - 34.0)     MCHC                    
                32.2                         g/dL            (31.0 - 36.0)     
RDW                                     14.9             H           %          
     (11.5 - 14.5)     PLATELETS                               249              
            10/uL         (150 - 450)     MPV                                   
  10.7             H           fL              (7.4 - 10.4)     NEUT            
                        59.1                         %               (37.0 - 
80.0)     LYMPH                                   29.8                         %
               (25.0 - 40.0)     MONO                                    8.0    
                      %               (3.0 - 8.0)     EOS                       
              1.9                          %               (0.0 - 7.0)     BASO 
                                   0.9                          %               
(0.0 - 2.5)     %IG                                     0.3              H      
     %               (0.0 - 0.0)     %NRBC                                   0.0
                          %               (0.0 - 0.0)     #NEUT                 
                  4.45                         10/uL         (2.00 - 6.90)     
#LYMPH                                  2.24                         10/uL      
   (0.60 - 3.40)     #MONO                                   0.60               
          10/uL         (0.00 - 0.90)     #EOS                                  
  0.14                         10/uL         (0.00 - 0.70)                      
                                                                                
          3                         Clinical Report - Physicians/Mid Levels     
                               [facility]                           
         Emergency Department                              [address] [place] [address]                                Phone #: [phone]                                        2020 21:15                    
  ----------------------------------------------------                          
        Patient: ODILIA SANTIAGO                                MRN: [id number]    
  Acct#: [id number]                              Sex: F : 1995 Age: 24y 
#BASO                                      0.07                         10/uL   
      (0.00 - 0.20) #IG                                        0.02             
            10/uL         (0.00 - 0.10) #NRBC                                   
   0.00                         10/uL         (0.00 - 0.00) MANUAL DIFF         
                       NOT INDICATED RBC MORPH                                  
NOT INDICATEDCMP:    (GUERITA: 2020 21:57)            ( MsgRcvd 2020 
22:40) Final results **Test**                                   **Result**      
**Flag**     **Units**      **(Reference)** COMPREHENSIVE METABOLIC PANEL     
COMPREHENSIVE METABOLIC PANEL SODIUM                                    137     
                     mEq/L             (134 - 153) POTASSIUM                    
             3.3              L           mEq/L             (3.6 - 5.0) CHLORIDE
                                  102                          mEq/L            
 (98 - 107) CO2                                       22                        
   MEQ/L             (22 - 30) GLUCOSE                                   95     
                      MG/DL             (65 - 110) BUN                          
             7                            MG/DL             (7 - 21) CREATININE 
                               0.6              L           MG/DL             
(0.7 - 1.5) BUN/CREAT                                 12                        
                     (8 - 27) TOTAL PROTEIN                             7.4     
                     G/DL              (6.3 - 8.2) ALBUMIN                      
             4.3                          G/DL              (3.9 - 5.0) GLOBULIN
                                  3.1                          GM/DL            
 (2.4 - 3.2) A/G RATIO                                 1.4                      
                      (0.8 - 2.0) CALCIUM                                   9.9 
                         MG/DL             (8.4 - 10.2) TOTAL BILI              
                  1.1                          MG/DL             (0.2 - 1.3) 
ALKALINE PHOS                             62                           U/L      
         (38 - 126) SGOT/AST                                  43               H
           U/L               (5 - 40) SGPT/ALT                                  
61               H           U/L               (7 - 56) ANION GAP               
                  13.0                         mmol/L            (8.0 - 16.0) 
AGE                                       24                           yrs NON-
AA GFR                                >60                          mL/min AFR 
AMER GFR                              >60                          mL/min       
                         Male GFR Interprentation                   20-49 yrs   
    >60 mL/min    Uvtebu74-62 yrs     >56 mL/min   Normal                   60-
69 yrs     >49 mL/min    Normal                    70-79yrs>42 mL/min   Normal  
                 80 and above >35 mL/min     Normal                      Female 
GFRInterpretation                   20-39 yrs      >60 mL/min   Normal          
          40-49 yrs      >58 mL/minNormal                   50-59 yrs     >51 
mL/min    Normal                   60-69 yrs      >45 mL/min    Tltslr20-51 yrs 
    >39 mL/min    Normal                   80 and above >32 mL/min     
NormalUrinalysis:      (GUERITA: 2020 22:30)          ( MsgRcvd 2020 
22:50) Final results **Test**                                   **Result**      
**Flag**     **Units**      **(Reference)** URINALYSIS     URINALYSIS SOURCE    
                                 R  COLOR                                     
alonso                                        (NORMAL: Yello  CLARITY            
                       clear                                        (NORMAL: 
Clear  SPEC GRAVITY                              1.020                          
              (1.001 - 1.030  pH                                        6       
                                     (5 - 9)  GLUCOSE                           
        NORM                                         (NORMAL: Negat  BILIRUBIN  
                               1                                            
(NORMAL: Negat  KETONE                                    50              A     
                       (NORMAL: Negat  PROTEIN                                  
 15                                           (NORMAL: Negat  NITRITE           
                        NEG                                          (NORMAL: 
Negat  BLOOD                                     NEG                            
              (NORMAL: Negat  LEUK EST                                  25      
                                     (NORMAL: Negat  UROBILINOGEN               
               12                                           (less than 1.0  
MICROSCOPIC                               See Below  WBC                        
               1 - 3                                        (NORMAL: NONE       
                                                                                
                                  4                             Clinical Report 
- Physicians/Mid Levels                                        [facility]                                        Emergency Department               
                   [address] [place] [address]                         
           Phone #: [phone]                                    
        2020 21:15                          
----------------------------------------------------                            
          Patient: ODILIA SANTIAGO                                    MRN: 
[id number]      Acct#: [id number]                                  Sex: F : 
1995 Age: 24y       RBC                                      None Seen    
                                  (NORMAL: NONE       EPITHELIAL                
               FEW                                            (NORMAL: NONE     
  BACTERIA                                 Trace                                
          (NORMAL: NONE  Lipase:    (GUERITA: 2020 21:46)                 ( 
MsgRcvd 2020 21:49) Canceled.PROGRESS AND PROCEDURESCourse of Care: 23:15 
20. workup all in and reviewed, mild hypoK and mild transaminitis; pt 
stillvomiting post-reglan; will give phenergan iv and KCl iv  00:17 20. pt
 doing much better; no Vomiting; ready to go home; will d/c w instructions to 
f/u w OB  GYN in the am.  Patient counseled in person regarding the patient's 
stable condition, test results, diagnosis and need for  follow-up. Patient 
agrees with plan of care.  Disposition: Condition: good and stable.  Discharge 
decision based on the following: patient's condition is stable; patient's 
condition is improved;  patient is ambulatory; patient is active; patient drinki
ng fluids; patient's pain is controlled; patient's exam is  improved; no 
seriously abnormal test results; improving condition on repeat evaluation; 
social support is  good; transportation is available; follow-up is available; 
clinical impression is consistent with outpatient  treatment.CLINICAL IMPRESSION
 Moderate hyperemesis gravidarum less than 21 weeks with electrolyte imbalance. 
No volume depletion, dehydration or ketones.INSTRUCTIONS Drink plenty of fluids.
 Avoid alcohol and NSAIDS. NSAIDS include aspirin, ibuprofen (Advil) and 
naproxen (Aleve). Avoid fatty, fried/greasy, lactose-containing (such as milk, 
cheese and ice cream), salty and spicy foods. No alcohol. Do not smoke. (PLEASE 
FOLLOW UP WITH YOUR OB GYN TODAY REGARDING YOUR PERSISTENT HYPEREMESIS; PLEASE 
FILL THE DICLEGIS AND REGLAN SCRIPTS GIVEN TO YOU BY ME ON ).  Warnings: 
Further evaluation is necessary. It is very important to follow up with a 
healthcare provider.  GENERAL WARNINGS: Return or contact your physician immedia
tely if your condition worsens or  changes unexpectedly, if not improving as 
expected, or if other problems arise. SPECIFICALLY, return if  you develop pain 
in the abdomen or back, fever, vomiting, the inability to keep fluids down, 
blood in                                                                        
                                         5                          Clinical 
Report - Physicians/Mid Levels                                      [facility]                                      Emergency Department         
                       [address] [place] [address]                     
             Phone #: [phone]                                  
        2020 21:15                        
----------------------------------------------------                            
        Patient: ODILIA SANTIAGO                                  MRN: [id number]  
    Acct#: [id number]                                Sex: F : 1995 Age: 
24y vomitus, blood in diarrhea, fainting, lightheadedness or vaginal bleeding. 
Your Current Medications: Your current home medications have been reviewed. 
CONTINUE TAKING THE FOLLOWING MEDICATIONS: Promethazine HCl Oral : prn. Follow-
up: Return to the emergency department as needed. Follow up with an obstetrician
 today even if well. Call for an appointment. Reason for referral: evaluation 
and treatment. Summary of care provided to patient via paper. Understanding of 
the discharge instructions verbalized by patient. Expected course of illness, 
discharge instructions, activity level, diet, follow-up appointment and risks 
and benefits of treatment reviewed with patient and understanding verbalized. 
Agrees to plan of care.(Electronically signed by Av Davis M.D. 
2020 01:16)  









          Name      Value     Range     Interpretation Code Description Data Karlie

rce(s) Supporting 

Document(s)

 

                                                                       









                    ID                  Date                Data Source

 

                    [id number]     2020 10:40:00 PM EDT [facility]









          Name      Value     Range     Interpretation Code Description Data [facility]

rce(s) Supporting 

Document(s)

 

          [facility]YSIS                                         [facility]

te  

 

                                            URINALYSIS 

 

          SOURCE    R                                       [facility]

al  

 

          COLOR     alonso     NORMAL: Yellow                     [facility]

ospital  

 

          CLARITY   clear     NORMAL: Clear                     [facility]

spital  

 

           Specific gravity of Urine by Test strip 1.020      1.001 - 1.030     

                  [facility]                                 

 

          pH        6         5 - 9                         [facility]

al  

 

           Glucose [Mass/volume] in Urine by Test strip NORM       NORMAL: Negat

[facility]                            

 

           Bilirubin.total [Presence] in Urine by Test strip 1          NORMAL: 

Negative                       

[facility]                   

 

           Ketones [Presence] in Urine by Test strip 50         NORMAL: Negative

 A                     [facility]                                 

 

           Protein [Mass/volume] in Urine by Test strip 15         NORMAL: Negat

[facility]                            

 

           Nitrite [Presence] in Urine by Test strip NEG        NORMAL: Negative

                       [facility]                                

 

          BLOOD     NEG       NORMAL: Negative                     [facility]  

 

           Leukocyte esterase [Presence] in Urine by Test strip 25         CAITY

L: Negative                       

[facility]                   

 

           Urobilinogen [Mass/volume] in Urine by Test strip 12         less radha

n 1.0 mg/dL                       

[facility]                   

 

          MICROSCOPIC See Below                               [facility]

ital  

 

          WBC       1 - 3     NORMAL: NONE SEEN                     [facility]  

 

           Erythrocytes [#/volume] in Urine by Test strip None Seen  NORMAL: NON

E SEEN                       

[facility]                   

 

          EPITHELIAL FEW       NORMAL: NONE SEEN                     [facility]  

 

                    Bacteria [Presence] in Urine sediment by Light microscopy Tr

ace               NORMAL: NONE 

SEEN                                            [facility]  









                    ID                  Date                Data Source

 

                    [id number]     2020 10:40:00 PM EDT [facility]









          Name      Value     Range     Interpretation Code Description Data Karlie

rce(s) Supporting 

Document(s)

 

          COMPREHENSIVE METABOLIC PANEL                                         

[facility]  

 

                                            COMPREHENSIVE METABOLIC PANEL 

 

           Sodium [Moles/volume] in Serum or Plasma 137 mEq/L  134 - 153        

                [facility]                                 

 

           Potassium [Moles/volume] in Serum or Plasma 3.3 mEq/L  3.6 - 5.0  L  

                   [facility]                            

 

           Chloride [Moles/volume] in Serum or Plasma 102 mEq/L  98 - 107       

                  [facility]                                 

 

           Carbon dioxide, total [Moles/volume] in Serum or Plasma 22 MEQ/L   22

 - 30                          

[facility]                   

 

           Glucose [Mass/volume] in Serum or Plasma 95 MG/DL   65 - 110         

                [facility]                                 

 

          BUN       7 MG/DL   7 - 21                        [facility]  

 

           Creatinine [Mass/volume] in Serum or Plasma 0.6 MG/DL  0.7 - 1.5  L  

                   [facility]                            

 

          BUN/CREAT 12        8 - 27                        [facility]  

 

           Protein [Mass/volume] in Serum or Plasma 7.4 G/DL   6.3 - 8.2        

                [facility]                                 

 

           Albumin [Mass/volume] in Serum or Plasma 4.3 G/DL   3.9 - 5.0        

                [facility]                                 

 

           Globulin [Mass/volume] in Serum by calculation 3.1 GM/DL  2.4 - 3.2  

                      [facility]                            

 

          A/G RATIO 1.4       0.8 - 2.0                     [facility]  

 

           Calcium [Mass/volume] in Serum or Plasma 9.9 MG/DL  8.4 - 10.2       

                [facility]                                 

 

           Bilirubin.total [Mass/volume] in Serum or Plasma 1.1 MG/DL  0.2 - 1.3

                        

[facility]                   

 

                    Alkaline phosphatase [Enzymatic activity/volume] in Serum or

 Plasma 62 U/L              38 - 

126                                             [facility]  

 

                          Aspartate aminotransferase [Enzymatic activity/volume]

 in Serum or Plasma 43 U/L

             5 - 40       H                         [facility]  

 

                    Alanine aminotransferase [Enzymatic activity/volume] in Seru

m or Plasma 61 U/L              7

- 56            H                               [facility]  

 

           Anion gap 3 in Serum or Plasma 13.0 mmol/L 8.0 - 16.0                

       [facility]

                                         

 

          AGE       24 yrs                                  [facility]

al  

 

          NON-AA GFR >60 mL/min                               [facility]

ital  

 

          AFR AMER GFR >60 mL/min                               [facility]

spital  

 

                                                                     Male GFR In

terprentation                  20-49 yrs

    >60 mL/min   Normal                  50-59 yrs     >56 mL/min   Normal      
           60-69 yrs     >49 mL/min   Normal                  70-79yrs      >42 
mL/min   Normal                  80 and above  >35 mL/min   Normal              
     Female GFR  Interpretation                  20-39 yrs     >60 mL/min   
Normal                  40-49 yrs     >58 mL/min   Normal                  50-59
yrs     >51 mL/min   Normal                  60-69 yrs     >45 mL/min   Normal  
               70-79 yrs     >39 mL/min   Normal                  80 and above  
>32 mL/min   Normal 









                    ID                  Date                Data Source

 

                    [id number]     2020 10:11:00 PM EDT [facility]









          Name      Value     Range     Interpretation Code Description Data Karlie

rce(s) Supporting 

Document(s)

 

          CBC W/AUTOMATED DIFF                                         [facility]  

 

                                            COMPLETE BLOOD COUNT 

 

           Leukocytes [#/volume] in Blood by Automated count 7.5 10^3/uL 4.2 - 1

1.0                       

[facility]                   

 

             Erythrocytes [#/volume] in Blood by Automated count 4.81 10^6/uL 4.

20 - 5.40                

                          [facility]     

 

           Hemoglobin [Mass/volume] in Blood 12.6 g/dL  12.0 - 16.0             

          [facility]                                 

 

           Hematocrit [Volume Fraction] of Blood by Automated count 39.1 %     3

7.0 - 47.0                       

[facility]                   

 

                    Erythrocyte mean corpuscular volume [Entitic volume] by Auto

mated count 81.3 fL             

81.0 - 101                                      [facility]  

 

                          Erythrocyte mean corpuscular hemoglobin [Entitic mass]

 by Automated count 26.2 

pg           27.0 - 34.0  L                         [facility]  

 

                                        Erythrocyte mean corpuscular hemoglobin 

concentration [Mass/volume] by Automated

 count     32.2 g/dL  31.0 - 36.0                       [facility]  

 

             Erythrocyte distribution width [Ratio] by Automated count 14.9 %   

    11.5 - 14.5  H             

                          [facility]     

 

           Platelets [#/volume] in Blood by Automated count 249 10^3/uL 150 - 45

0                        

[facility]                   

 

                    Platelet mean volume [Entitic volume] in Blood by Automated 

count 10.7 fL             7.4 - 

10.4            H                               [facility]  

 

           Neutrophils/100 leukocytes in Blood by Automated count 59.1 %     37.

0 - 80.0                       

[facility]                   

 

           Lymphocytes/100 leukocytes in Blood by Manual count 29.8 %     25.0 -

 40.0                       

[facility]                   

 

           Monocytes/100 leukocytes in Blood by Automated count 8.0 %      3.0 -

 8.0                        

[facility]                   

 

           Eosinophils/100 leukocytes in Blood by Automated count 1.9 %      0.0

 - 7.0                        

[facility]                   

 

           Basophils/100 leukocytes in Blood by Automated count 0.9 %      0.0 -

 2.5                        

[facility]                   

 

          %IG       0.3 %     0.0 - 0.0 H                   [facility]

al  

 

          %NRBC     0.0 %     0.0 - 0.0                     [facility]

[facility]  

 

           Neutrophils [#/volume] in Blood by Automated count 4.45 10^3/uL 2.00 

- 6.90                       

[facility]                   

 

           Lymphocytes [#/volume] in Blood by Automated count 2.24 10^3/uL 0.60 

- 3.40                       

[facility]                   

 

           Monocytes [#/volume] in Blood by Automated count 0.60 10^3/uL 0.00 - 

0.90                       

[facility]                   

 

           Eosinophils [#/volume] in Blood by Automated count 0.14 10^3/uL 0.00 

- 0.70                       

[facility]                   

 

           Basophils [#/volume] in Blood by Automated count 0.07 10^3/uL 0.00 - 

0.20                       

[facility]                   

 

          #IG       0.02 10^3/uL 0.00 - 0.10                     [facility]

ospital  

 

          #NRBC     0.00 10^3/uL 0.00 - 0.00                     [facility]

ospital  

 

          MANUAL DIFF NOT INDICATED                               [facility]  

 

          RBC MORPH NOT INDICATED                               [facility]

spital  









                    ID                  Date                Data Source

 

                    [id number]      2020 06:58:00 PM EDT [facility]

 

                                                                                

                                        

              1                                         OrderSheet              
                      [facility]                                    
Emergency Department                              [address] [place] [address]                                Phone #: [phone]         
                               2020 18:40                      
----------------------------------------------------                            
      Patient: ODILIA SANTIAGO                                MRN: [id number]      
Acct#: [id number]                              Sex: F : 1995 Age: 
24yWEIGHT:73.4 kg (S) HEIGHT:66 inches (S) BMI:26.1ALLERGIES: No Known Drug 
AllergyCHIEF COMPLAINT: vomitingDIAGNOSIS: Hyperemesis gravidarumLAB ORDERSOrder
 Description      Priority      Entered              Acknowledged        
InitialedCBC w Diff             STAT          19:24 2020                  
       19:25 Lesa Montgomery Riccardo John R.N. M.D.;CMP          
          STAT          19:2020                         19:25 Lesa Montgomery Riccardo John R.N. M.D.;Lipase                 STAT        
  19:2020                         19:25 Lesa Montgomery Riccardo John R.N. M.D.;HCG Serum Quant        STAT          19:2020    
                     19:25 Lesa Montgomery Riccardo John R.N. M.D.;Magnesium              STAT          :2020                      
   19:41 Lesa Ryan Riccardo Robert R.N. M.D.;DIAGNOSTIC STUDY
 ORDERSOrder Description  Priority          Entered              Acknowledged   
    InitialedMEDICATION/IV/DRIP/FLUID ORDERSOrder Description    Priority       
 Entered              Acknowledged        InitialedNS IV 1000 mL                
        19:2020                         20:01 Valentina,Bolus: : Bolus 
1000                 Fideliarin, Av Denton R.N.mL (X1)  
                           M.D.;Reglan 10 mg IVP                     19:2020                         20:01 Valentina,X1 dose: 10 mg                  
    Turrin, Avdo Denton R.N.(NOW x1)                  
          M.D.;Pepcid IVPB 20                       19:2020            
             20:02 Valentina,mg/50mL (NOW x1,                    Turrin, Avdo Denton R.N.Infuse over 30                      
M.D.;minutes.)                                                                  
                                        2                                       
     OrderSheet                                     [facility]      
                               Emergency Department                             
  [address] [place] [address]                                 Phone #: 
[phone] ext- [phone]                                         2020 
18:40                       ----------------------------------------------------
                                   Patient: ODILIA SANTIAGO                    
             MRN: [id number]      Acct#: [id number]                               Sex:
 F : 1995 Age: 24yPotassium Chloride                     20:17 
2020       Cancelled: Physician Order 20:22 Lesa,Liquid PO 40 meq       
               Av Davis M.D., M.D.;Zofran IVP 8 mg                        20:22 2020           
                  21:31 Lesa Montgomery Riccardo John R.N. M.D.;GENERAL ORDERSOrder Description        Priority      Entered           
     Acknowledged          Initialed[Electronically signed by Bertin Montgomery R.N. 
(22:04 2020)][Electronically signed by Av Davis M.D. (22:30 
2020)][Electronically locked by Bertin Montgomery R.N. (22:2020)]  









          Name      Value     Range     Interpretation Code Description Data Karlie

rce(s) Supporting 

Document(s)

 

                                                                       









                    ID                  Date                Data Source

 

                    [id number]      2020 06:58:00 PM EDT [facility]

 

                                                                                

                                        

                       1                            Medication Reconciliation 
Report                                    [facility]                
                   Emergency Department                              [address] [place] [address]                                Phone #: [phone]
[phone]                                        2020 18:40          
           ----------------------------------------------------                 
                Patient: ODILIA SANTIAGO                                MRN: 
[id number]      Acct#: [id number]                              Sex: F : 1995 
Age: 24yWeight:      73.4 kgHeight/Length:      66 in.BMI:         
26.1ALLERGIES: No Known Drug AllergyThe patient's Home Medications are listed 
below:NONE.The source(s) of the original Home Medication information:patientThe 
following Medications were given to the patient in the Emergency Department:NS 
[IV] IV Fluids bolus 0, then 1000 mL/hr, administered: 2020 8:01:00 
PMReglan [IVP] IVP 10 mg, administered: 2020 8:01:00 PMPepcid [IVPB] IVPB 
bolus 0, then 20 mg 100 mL/hr, administered: 2020 8:02:00 PMZofran [IVP] 
IVP 8 mg, administered: 2020 9:06:00 PMThe following Medications were 
prescribed to the patient:Diclegis 10 mg-10 mg tablet,delayed release Take 2 
tablet every night for 10 days -- Dispense 20tablet. Refills: 2. Substitution 
permitted.Pharmacy - [address] ; [place] [address]. Phone: [phone] FaxNumber: [phone].Reglan 10 mg tablet Take 1
 tablet four times a day as needed for 7 days -- Dispense 28 tablet. Refills: 
2.Substitution permitted.[facility]  [address] [facility] 
; [place] [address]. Phone: [phone] FaxNumber: [phone]. -- Av Davis M.D.  









          Name      Value     Range     Interpretation Code Description Data Karlie

rce(s) Supporting 

Document(s)

 

                                                                       









                    ID                  Date                Data Source

 

                    [id number]      2020 06:58:00 PM EDT [facility]

 

                                                                                

                                        

                   1                              Medication Administration 
Record                                      [facility]              
                        Emergency Department                                [address] [place] [address]                                  Phone #: [phone]                                          2020 18:40   
                     ----------------------------------------------------       
                             Patient: ODILIA SANTIAGO                          
        MRN: [id number]      Acct#: [id number]                                Sex: F 
: 1995 Age: 24yWeight: 73.4 kgHeight/Length: 66 inBMI:    
26.1ALLERGIES:       No Known Drug Allergy      Date/Time                    
Medication Administered            Medication OrderedStart                      
   NS [IV]                              NS IV 1000 mL Bolus: : Bolus 126671:01 
2020              Dose: IV Fluids                      mL (X1)Bertin Montgomery R.N.        Rate: 1000 mL/hr over 60 minute(s)----                        
  Dispensed: 1000 mL bagStop                          Site: #1 left AC22:03 
2020Bertin Montgomery R.N.Given                         REGLAN [IVP] 
(METOCLOPRAMIDE         Reglan 10 mg IVP X1 dose: 10 mg20:01 2020         
     HCL)                                 (NOW x1)Bertin Montgomery R.N.        
Dose: 10 mg IVP                          Site: #1 left ACStart                  
       PEPCID [IVPB]                        Pepcid IVPB 20 mg/50mL (NOW20:02 
2020              Dose: 20 mg IVPB                     x1, Infuse over 30 
minutes.)Bertin Montgomery R.N.        Rate: 100 mL/hr over 30 minute(s)----        
                  Dispensed: 50 mL bagStop                          Site: #1 
left AC22:04 2020Bertin Montgomery R.N.Given                         ZOFRAN 
[IVP] (ONDANSETRON HCL)       Zofran IVP 8 mg21:06 2020              Dose:
 8 mg IVPGBertin rivera R.N.        Site: #1 left AC  









          Name      Value     Range     Interpretation Code Description Data Karlie

rce(s) Supporting 

Document(s)

 

                                                                       









                    ID                  Date                Data Source

 

                    [id number]      2020 06:58:00 PM EDT [facility]

 

                                                                                

                                        

                                  1                                       
General Instructions                                     [facility] 
                                    Emergency Department                        
       [address] [place] [address]                                 
Phone #: [phone]- [phone]                                         
2020 18:40                       
----------------------------------------------------                            
       Patient: ODILIA SANTIAGO                                 MRN: [id number]    
  Acct#: [id number]                               Sex: F : 1995 Age: 
24yMild hyperemesis gravidarum less than 21 weeks ( 7+ weeks).INS
TRUCTIONSDrink plenty of fluids. Avoid alcohol and NSAIDS. NSAIDS include 
aspirin, ibuprofen (Advil) and naproxen(Aleve). Avoid fatty, fried/greasy, 
lactose-containing (such as milk, cheese and ice cream), salty and spicyfoods. 
No alcohol. Do not smoke.Warnings: Further evaluation is necessary. It is very 
important to follow up with a healthcare provider.GENERAL WARNINGS: Return or 
contact your physician immediately if your condition worsens orchanges 
unexpectedly, if not improving as expected, or if other problems arise. 
SPECIFICALLY, return ifyou develop pain in the abdomen or back, fever, vomiting,
 the inability to keep fluids down, blood invomitus, blood in diarrhea, fainting
 or lightheadedness.Your Current Medications: .No home medication.Prescription 
Medications:Diclegis 10 mg-10 mg tablet,delayed release Take 2 tablet every 
night for 10 days -- Dispense 20tablet. Refills: 2. Substitution 
permitted.Pharmacy - [facility]  [employer] [facility] ; [place] [address]. Phone: [phone] FaxNumber: [phone].Reglan 10 mg tablet Take 1
 tablet four times a day as needed for 7 days -- Dispense 28 tablet. Refills: 
2.Substitution permitted.Pharmacy - [facility] [employer] [facility] 
; [place] [address]. Phone: [phone] FaxNumber: [phone].Follow-up:Return to the emergency department as needed. Follow up with 
an obstetrician in two days as scheduled.Reason for referral: evaluation and 
treatment. Summary of care provided to patient via paper.Understanding of the 
discharge instructions verbalized by patient. Expected course of illness, 
dischargeinstructions, activity level, diet, prescriptions x2, follow-up 
appointment and risks and benefits of treatmentreviewed with patient and 
understanding verbalized. Agrees to plan of care.                               
     ADDITIONAL INFORMATION                                                     
                                                      2                         
           General Instructions                                   [facility]                                   Emergency Department                 
            [address] [place] [address]                               
Phone #: [phone]- [phone]                                       
2020 18:40                     
----------------------------------------------------                            
     Patient: ODILIA SANTIAGO                               MRN: [id number]      
Acct#: [id number]                             Sex: F : 1995 Age: 
24yHyperemesis GravidarumHyperemesis gravidarum is a severe form of morning 
sickness that can affect some women duringpregnancy. It may develop around the 
5th week and last until the 16th week of pregnancy. In somewomen, it may last 
longer. Symptoms include severe nausea and vomiting. This can lead to 
problemssuch as weight loss and dehydration.It's not clear what causes 
hyperemesis gravidarum. It may be from rising hormone levels early in 
thepregnancy. It can be a serious threat to mother and fetus if symptoms are 
severe. Follow the advicebelow carefully. If your symptoms don't get better with
 home care measures, you may need to stay inthe hospital. In the hospital, you 
may get IV (intravenous) fluids and medicines.Home careDiet   Keep a log of the 
foods you eat and how they affect your symptoms. Don't eat foods that    trigger
 your symptoms.    Eat small meals often throughout the day rather than 3 large 
meals. This can help keep your    stomach from being empty. An empty stomach can
 make nausea worse.    Choose foods that are high in carbohydrates. Eating foods
 high in protein may also help. Limit    greasy or spicy foods.    Before 
getting out of bed in the morning, try eating crackers or dry toast. This may 
help settle    your stomach.    Drink cold, clear liquids. Drinking small 
amounts of liquids with electrolytes, such as sports    drinks, may help as 
well.Medicine   If needed, your healthcare provider may prescribe certain 
medicines to help ease nausea and    vomiting. Your provider may suggest vitamin
 B6 and ginger. Don't try any over-the-counter    medicines or home remedies 
without talking with your provider first.Follow-up careFollow up with your 
healthcare provider, or as advised.When to seek medical adviceCall your 
healthcare provider right away if any of these occur:    Signs of dehydration. 
These include dry mouth, extreme thirst, dark urine or little urine output,     
                                                                                
                                                          3                     
                              General Instructions                              
               [facility]                                           
  Emergency Department                                       [address] [place] [address]                                         Phone #: [phone]
[phone]- [phone]                                                 2020 18:40 
                              
----------------------------------------------------                            
               Patient: ODILIA SANTIAGO                                        
 MRN: [id number]      Acct#: [id number]                                       Sex: F 
: 1995 Age: 24y      dizziness, weakness, or fainting.       Vomiting 
that won't stop       Inability to keep down liquids       Frequent diarrhea    
   Weight loss or no weight gain over a 2-week period       Severe constant pain
 in the lower right abdomen       Fever of 100.4F (38C) or higher, or as 
directed by your healthcare provider [id number] [employer]. [address] [place] [address]. All rights reserved. This information is 
not intended as asubstitute for professional medical care. Always follow your 
healthcare professional's instructions. You have been given the following 
additional information: Hyperemesis Gravidarum(Electronically signed by Av Davis M.D. 2020 22:30)  









          Name      Value     Range     Interpretation Code Description Data Karlie

rce(s) Supporting 

Document(s)

 

                                                                       









                    ID                  Date                Data Source

 

                    [id number]      2020 06:58:00 PM EDT [facility]

 

                                                                                

                                        

                            1                                   Clinical Report 
- Nurses                                    [facility]              
                      Emergency Department                              [address] [place] [address]                                Phone #: [phone]- [phone]                                        2020 18:40      
                ----------------------------------------------------            
                      Patient: ODILIA SANTIAGO                                
MRN: [id number]      Acct#: [id number]                              Sex: F : 
1995 Age: 24yTRIAGEArrived by private vehicle. Historian: patient.Acuity: 
LEVEL 3.Chief Complaint: (nausea/vomitting).Alert. No acute distress.This 
started last night. ( patient reports she is 8 weeks gestation and feeling 
"dehydrated," and "cannotkeep anything down."). No fever or swelling. Last oral 
intake by patient was (PTA).Treatment PTA:None. --18:54 20 Kena Saldaña R.N.18:48 20. BP: 103/60. MAP: 74. HR: 90. RR: 16. O2 saturation: 99%. 
Temp: 97.3 F. Pain level now:3/10. --18:54 20 Kena Saldaña R.N.( denies 
fevers, cough, SOB or recent traveling.). --18:55 20 Kena Saldaña R.N.Weight: 73.4 kg stated. Height/Length: 66 inches Per Patient. BMI: 26.1. 
--18:47 20 Kena Saldaña R.N.MedicationsNone. --18:52 20 Kena Saldaña R.N.AllergiesNo Known Drug Allergy. --18:52 20 Kena Saldaña R.N.PROBLEMS:no known problems.Medication/allergy information source: the 
patient. --18:54 20 Kena Saldaña R.N.ADDITIONAL SURGERIES:C- 
sections.Cellulitus. --18:53 20 Kena Saldaña R.N.HistoryPAST MEDICAL HX:
 Last normal menstrual period- May 25. Confirmed pregnancy. In 1st 
trimester.Pregnancy confirmed with urine test, serum test and sonogram.SOCIAL 
HX: Never smoker. No alcohol use or drug use. The patient was offered HIV t
esting butdeclined and hepatitis C testing but declined.                        
                                                                                
         2                                     Clinical Report - Nurses         
                            [facility]                              
       Emergency Department                               [address] [place] [address]                                 Phone #: [phone] ext- [phone]                                         2020 18:40                   
    ----------------------------------------------------                        
           Patient: ODILIA SANTIAGO                                 MRN: [id number]
      Acct#: [id number]                               Sex: F : 1995 Age: 
24y Infectious disease exposure: No infectious disease exposure. The patient was
 not exposed to C-diff, MRSA, VRE or CRE. SELF HARM ASSESSMENT: Self harm 
assessment was performed. The patient answered "no&quot; to the question(s) 
"Have you recently felt down, depressed, or hopeless?", "Do you have thoughts of
 harming or killing yourself?", "Do you have a plan for harming or killing y
ourself?", "Have you recently had thoughts about harming or killing others?", 
"Do you have any dangerous items in your possession?", "Have you noticed less 
interest or pleasure in doing things?", "Are you here because you tried to hurt 
yourself?" and "Have you ever tried to hurt yourself before today?". ABUSE 
ASSESSMENT: No report of abuse. NUTRITIONAL RISK ASSESSMENT: The nutritional 
risk assessment revealed no deficiencies. FUNCTIONAL ASSESSMENT: Functional 
assessment: no impairments noted. LEARNING NEEDS ASSESSMENT: The learning needs 
assessment revealed no barriers. FALL RISK ASSESSMENT: Fall risk assessment 
completed. No risk factors identified. SKIN INTEGRITY ASSESSMENT: Skin integrity
 risk assessment completed. No skin integrity risk identified. --18:54 20 
Kena Saldaña R.N. SOCIAL HX: The patient has not traveled outside the [place] 
Infectious disease exposure: No infectious disease exposure. --18:55 20 
Kena Saldaña R.N.PHYSICAL ASSESSMENTGENERAL / NEURO / PSYCH: Alert. Oriented 
X 4. Appears anxious.RESPIRATORY: Respirations not labored.GI / : Emesis 
noted.SKIN: Skin is warm and dry. --20:02 20 Bertin Montgomery R.N.NURSING 
PROGRESS NOTESPatient gowned. Head of bed elevated. Reassurance given. Call 
light placed in reach. Bed placed inlowest position. Brakes of bed on. Patient 
ready for evaluation. --19:13 20 Kena Saldaña R.N. 19:42 2020 Site
 #1 started via IV in the left antecubital space with an 20g angiocath, with 
aseptic technique and good blood return; one attempt. Blood drawn: rainbow set 
and green tube(s). Saline lock flushed with 10 mL saline. --19:42 20 Edgar Ryan R.N. 20:01 2020 Started bag #1 1000 mL IV Fluids NS; at 1000 
mL/hr over 60 minute(s) via site #1 --20:01 20 Bertin Montgomery R.N. 20:01 
2020 Reglan (Metoclopramide HCl) IVP 10 mg given over 3 minute(s) via site
 #1. --20:01                                                                    
                                        3                                    
Clinical Report - Nurses                                     [facility]                                     Emergency Department               
                [address] [place] [address]                            
     Phone #: [phone] ext- [phone]                                         
2020 18:40                       
----------------------------------------------------                            
       Patient: ODILIA SANTIAGO                                 MRN: [id number]    
  Acct#: [id number]                               Sex: F : 1995 Age: 24y 
20 Bertin Montgomery R.N. 20:02 2020 Started 20 mg of Pepcid IVPB in bag 
#1 50 mL; at 100 mL/hr over 30 minute(s) via site #1. --20:02 20 Bertin Montgomery R.N. ( pt called RN to room to inform her that she was vomiting despite 
medications given, info relayed to physician.). --20:23 20 Giuliana Perez R.N. 21:06 2020 Zofran (Ondansetron HCl) IVP 8 mg given over 3 minute(s) 
via site #1. --21:31 20 Bertin Montgomery R.N. Reassessment after medication 
administered. Nausea still present but improving. --21:34 20 Bertin Montgomery R.N. 21:35 20. BP: 108/60. MAP: 76. HR: 76. RR: 16. O2 saturation: 
deferred. Temp: deferred. Pain level now: 0/10. --21:35 20 Bertin Montgomery R.N. 22:03 2020 IV Fluids NS via IV site #1 Discontinued: bag #1 infused. 
Total amount infused: 1000 mL. --22:03 7/20/20 Bertin Montgomery R.N. 22:04 
2020 Pepcid IVPB via IV site #1 Discontinued: bag #1. Total amount 
infused: 50 mL. --22:04 20 Bertin Montgomery R.N.DISPOSITION / DISCHARGE No 
learning barriers present. The patient was discharged by the physician. She was 
discharged home. She left ambulatory and via private vehicle. Patient driving. 
--22:02 20 Bertin Montgomery R.N. 22:01 20. BP: 116/66. MAP: 82. HR: 74. 
RR: 18. O2 saturation: deferred. Temp: deferred. Pain level now: 0/10. --22:02 
20 Bertin Montgomery R.N. Departure time: 22:03 2020. --22:03 20 
Bertin Montgomery R.N.Locked/Released at 2020 22:04 by Bertin Montgomery R.N.  









          Name      Value     Range     Interpretation Code Description Data Karlie

rce(s) Supporting 

Document(s)

 

                                                                       









                    ID                  Date                Data Source

 

                    [id number]      2020 06:58:00 PM EDT [facility]

 

                                                                                

                                        

                               1                           Clinical Report - 
Physicians/Mid Levels                                       [facility]                                       Emergency Department             
                    [address] [place] [address]                        
           Phone #: [phone]- [phone]                                    
       2020 18:40                         
----------------------------------------------------                            
         Patient: ODILIA SANTIAGO                                   MRN: [id number]
      Acct#: [id number]                                 Sex: F : 1995 
Age: 24y Time Seen: 19:18 2020; initial patient contact. Arrived- By 
private vehicle. Historian- patient. Disposition decision: 21:49 
2020.HISTORY OF PRESENT ILLNESS Chief Complaint: VOMITING. No recent 
travel. She has had severe nausea. She has had severe vomiting. The vomiting has
 occurred several times. No blood-tinged emesis, coffee-grounds emesis, frankly 
bloody emesis or unusually dark emesis. No diarrhea, black stools, bloody stool
s, abdominal pain or constipation. No flank pain, history of possible bad food 
exposure, known contact with a sick individual or change in routine. Has not 
recently been camping or on antibiotics. This started today and is still present
 and worsening. It was gradual in onset and has been constant. The illness is 
described as severe. (pt is pregnant  7w2d). Similar symptoms previously. 
Patient has had similar symptoms occasionally. Recent medical care: The patient 
was seen recently at this facility. ( 20 for same; workup was done; UA nml;
 pelvic US showed IUP of 6w5d).REVIEW OF SYSTEMSNo fever, difficulty with 
urination, dark urine, headache or dizziness. No sore throat, cough, chest 
pain,difficulty breathing or excessive urination. No skin rash, jaundice, back 
pain, fainting episodes or blurredvision. Currently pregnant: LNMP:  EDC:
 2021 7w2d In 1st trimester. Recentlydiagnosed. Recent sonogram showed 
intrauterine pregnancy. G 3. P 2. All other systems reviewed andare negat
piedad.PAST HISTORYSee nurses notes. Problems: Discomfort of Pregnancy. Psoriasis. 
Pregnant. Additional Surgeries: C- sections. Cellulitus. . Medications:
                                                                                
                             2                             Clinical Report - 
Physicians/Mid Levels                                        [facility]                                        Emergency Department            
                      [address] [place] [address]                      
              Phone #: [phone]                                 
           2020 18:40                          
----------------------------------------------------                            
          Patient: ODILIA SANTIAGO                                    MRN: 
[id number]      Acct#: [id number]                                  Sex: F : 
1995 Age: 24y None. Allergies: No Known Drug Allergy.SOCIAL HISTORYNever 
smoker. No alcohol use or drug use.ADDITIONAL NOTESThe nursing notes have been 
reviewed with agreement regarding the chief complaint, HPI, ROS, PMH andpatient 
medications and allergies.PHYSICAL EXAMVital Signs: 2020 18:48 BP: 103/60.
 MAP: 74. HR: 90. RR: 16. O2 saturation: 99%. Temp: 97.3 F.Pain level now: 3/10.
 Have been reviewed. Oxygen saturation normal.Appearance: Alert. Oriented X3. No
 acute distress. (uncomfortable).Eyes: Pupils equal, round and reactive to 
light. Eyes normal inspection.ENT: Ears normal. Nose normal. Pharynx 
normal.Neck: Normal inspection. Neck supple.CVS: Normal heart rate and rhythm. H
eart sounds normal. Pulses normal.Respiratory: No respiratory distress. Painless
 inspiration. Breath sounds normal.Abdomen: Soft and nontender. Bowel sounds 
normal. No organomegaly. No mass. Femoral pulsesequal.Back: Normal 
inspection.Skin: Skin warm and dry. Normal skin color. No rash. Normal skin 
turgor.Extremities: Extremities exhibit normal ROM. No lower extremity 
edema.Neuro: Oriented X 3. No motor deficit. No sensory deficit. Reflexes 
normal.LABS, X-RAYS, AND EKGLaboratory Tests: Laboratory tests have been 
ordered, with results reviewed and considered in themedical decision making 
process. Magnesium:       (GUERITA: 2020 19:37)          ( [facility]
 20:09) Final results     **Test**                                **Result**    
   **Flag**    **Units**      **(Reference)**     MAGNESIUM                     
          1.9                          MG/DL           (1.7 - 2.2) CBC w Diff:  
    (GUERITA: 2020 19:37)          ( [facility] 2020 20:13) Final results 
    **Test**                                **Result**       **Flag**    
**Units**      **(Reference)**     CBC W/AUTOMATED DIFF         COMPLETE BLOOD 
COUNT     WBC                                     8.6                          
10/uL         (4.2 - 11.0)     RBC                                     4.91     
                    10/uL         (4.20 - 5.40)     HEMOGLOBIN                  
            12.9                         g/dL            (12.0 - 16.0)     
HEMATOCRIT                              39.8                         %          
     (37.0 - 47.0)     MCV                                     81.1             
            fL              (81.0 - 101)     MCH                                
     26.3             L           pg              (27.0 - 34.0)     MCHC        
                            32.4                         g/dL            (31.0 -
 36.0)     RDW                                     15.1             H           
%               (11.5 - 14.5)     PLATELETS                               256   
                       10/uL         (150 - 450)                                
                                                                                
3                         Clinical Report - Physicians/Mid Levels               
                      [facility]                                    
 Emergency Department                               [address] [place] [address]                                 Phone #: [phone]      
                                   2020 18:40                       
----------------------------------------------------                            
       Patient: ODILIA SANTIAGO                                 MRN: [id number]    
  Acct#: [id number]                               Sex: F : 1995 Age: 24y 
MPV                                       11.0             H            fL      
        (7.4 - 10.4) NEUT                                      69.4             
             %               (37.0 - 80.0) LYMPH                                
     21.1             L            %               (25.0 - 40.0) MONO           
                           6.3                           %               (3.0 - 
8.0) EOS                                       2.0                           %  
             (0.0 - 7.0) BASO                                      0.9          
                 %               (0.0 - 2.5) %IG                                
       0.3              H            %               (0.0 - 0.0) %NRBC          
                           0.0                           %               (0.0 - 
0.0) #NEUT                                     5.95                          
10/uL         (2.00 - 6.90) #LYMPH                                    1.81      
                    10/uL         (0.60 - 3.40) #MONO                           
          0.54                          10/uL         (0.00 - 0.90) #EOS        
                              0.17                          10/uL         (0.00 
- 0.70) #BASO                                     0.08                          
10/uL         (0.00 - 0.20) #IG                                       0.03      
                    10/uL         (0.00 - 0.10) #NRBC                           
          0.00                          10/uL         (0.00 - 0.00) MANUAL DIFF 
                              NOT INDICATED RBC MORPH                           
      NOT INDICATEDCMP:      (GUERITA: 2020 19:37)         ( MsgRcvd 
2020 20:14) Final results **Test**                                  
**Result**       **Flag**     **Units**      **(Reference)** COMPREHENSIVE 
METABOLIC PANEL     COMPREHENSIVE METABOLIC PANEL SODIUM                        
            138                          mEq/L             (134 - 153) POTASSIUM
                                 3.5              L           mEq/L             
(3.6 - 5.0) CHLORIDE                                  103                       
   mEq/L             (98 - 107) CO2                                       23    
                       MEQ/L             (22 - 30) GLUCOSE                      
             99                           MG/DL             (65 - 110) BUN      
                                 6                L           MG/DL             
(7 - 21) CREATININE                                0.6              L           
MG/DL             (0.7 - 1.5) BUN/CREAT                                 10      
                                       (8 - 27) TOTAL PROTEIN                   
          7.2                          G/DL              (6.3 - 8.2) ALBUMIN    
                               4.5                          G/DL              
(3.9 - 5.0) GLOBULIN                                  2.7                       
   GM/DL             (2.4 - 3.2) A/G RATIO                                 1.7  
                                          (0.8 - 2.0) CALCIUM                   
                9.6                          MG/DL             (8.4 - 10.2) 
TOTAL BILI                                <0.7                         MG/DL    
         (0.2 - 1.3) ALKALINE PHOS                             61               
            U/L               (38 - 126) SGOT/AST                               
   16                           U/L               (5 - 40) SGPT/ALT             
                     36                           U/L               (7 - 56) 
ANION GAP                                 12.0                         mmol/L   
         (8.0 - 16.0) AGE                                       24              
             yrs NON-AA GFR                                >60                  
        mL/min AFR AMER GFR                              >60                    
      mL/min                                Male GFR Interprentation            
       20-49 yrs       >60 mL/min    Hwszef05-25 yrs     >56 mL/min   Normal    
               60-69 yrs     >49 mL/min    Normal                    70-79yrs>42
 mL/min   Normal                   80 and above >35 mL/min     Normal           
           Female GFRInterpretation                   20-39 yrs      >60 mL/min 
  Normal                    40-49 yrs      >58 mL/minNormal                   
50-59 yrs     >51 mL/min    Normal                   60-69 yrs      >45 mL/min  
  Lillhf85-60 yrs     >39 mL/min    Normal                   80 and above >32 
mL/min     NormalLipase:     (GUERITA: 2020 19:37)               ( MsgRcvd 
2020 20:08) Final results **Test**                                  
**Result**       **Flag**     **Units**      **(Reference)** LIPASE             
                       13                            U/L             (13 - 
60)Beta-HCG, Quant Serum:     (GUERITA: 2020 19:37)               ( MsgRcvd 
2020 20:35) Final results **Test**                                  
**Result**       **Flag**     **Units**      **(Reference)** HCG QUANT          
                       431484.0                      mIU/mL                     
                                                                                
                4                            Clinical Report - Physicians/Mid 
Levels                                       [facility]             
                          Emergency Department                                 
[address] [place] [address]                                   Phone #: 
[phone]                                           2020 
18:40                         
----------------------------------------------------                            
         Patient: ODILIA SANTIAGO                                   MRN: [id number]
      Acct#: [id number]                                 Sex: F : 1995 
Age: 24y                                       Interpretation:                  
          Less than 5 mU/mL: Negative  6-10 mU/mL: Borderline (suggest repeat in
 48 hours)                                  >10: Positive  Approx HCG range 
(mU/mL) Weeks post LMP                            5.4-708 mU/mL 3-4 Weeks  217-
83537 mU/mL 5-6 Weeks                            4059-707531 mU/mL 7-8 Weeks  
24850-530353 mU/mL 9-10 Weeks                            61360-31513 mU/mL 12-14
 Weeks  35391-57350 mU/mL 15-16 Weeks                            8240-03195 
mU/mL 17-18 Weeks.PROGRESS AND PROCEDURESCourse of Care: 19:34 20. UA on 
20 was nml; pelvic US showed IUP 6w5d on same day  20:23 20. workup 
all in and reviewed and pretty much nml, mild hypoK at 3.5; pt started to vomit 
 again  21:47 20. pt doing much better, no more vomiting in the ER, 
abdomen soft, no pain; py has 1st  pre- visit in 2 days; will Rx Diclegis 
and Reglan; d/c instructions given.  Patient counseled in person regarding the 
patient's stable condition, test results, diagnosis and need for  follow-up. 
Patient agrees with plan of care.  Disposition: Condition: good and stable.  
Discharge decision based on the following: patient's condition is stable; 
patient's condition is improved;  patient is ambulatory; patient is active; 
patient drinking fluids; patient's pain is controlled; patient's exam is  
improved; no abnormal test results; improving condition on repeat evaluation; 
social support is good;  transportation is available; follow-up is available; 
clinical impression is consistent with outpatient treatment.CLINICAL IMPRESSION 
Mild hyperemesis gravidarum less than 21 weeks ( 7+ weeks).INSTRUCTIONS 
Drink plenty of fluids. Avoid alcohol and NSAIDS. NSAIDS include aspirin, 
ibuprofen (Advil) and naproxen (Aleve). Avoid fatty, fried/greasy, lactose-
containing (such as milk, cheese and ice cream), salty and spicy foods. No 
alcohol. Do not smoke.  Warnings: Further evaluation is necessary. It is very 
important to follow up with a healthcare provider.  GENERAL WARNINGS: Return or 
contact your physician immediately if your condition worsens or  changes 
unexpectedly, if not improving as expected, or if other problems arise. 
SPECIFICALLY, return if  you develop pain in the abdomen or back, fever, 
vomiting, the inability to keep fluids down, blood in  vomitus, blood in 
diarrhea, fainting or lightheadedness.                                          
                                                                           5    
                       Clinical Report - Physicians/Mid Levels                  
                    [facility]                                      
Emergency Department                                [address] [place] [address]                                  Phone #: [phone]     
                                     2020 18:40                        
----------------------------------------------------                            
        Patient: ODILIA SANTIAGO                                  MRN: [id number]  
    Acct#: [id number]                                Sex: F : 1995 Age: 
24y Your Current Medications: . No home medication. Prescription Medications: 
Diclegis 10 mg-10 mg tablet,delayed release Take 2 tablet every night for 10 
days -- Dispense 20 tablet. Refills: 2. [facility] permitted. [facility] [id number] ; [place] [address]. Phone: [phone]
[phone] FaxNumber: [phone]. Reglan 10 mg tablet Take 1 tablet four times a 
day as needed for 7 days -- Dispense 28 tablet. Refills: 2. Substitution 
permitted. Pharmacy - [facility] - [address] ; [place] [address]. Phone: [phone] FaxNumber: [phone]. Follow-up: Return to the
 emergency department as needed. Follow up with an obstetrician in two days as 
scheduled. Reason for referral: evaluation and treatment. Summary of care 
provided to patient via paper. Understanding of the discharge instructions 
verbalized by patient. Expected course of illness, discharge instructions, 
activity level, diet, prescriptions x2, follow-up appointment and risks and 
benefits of treatment reviewed with patient and understanding verbalized. Agrees
 to plan of care.(Electronically signed by Av Davis M.D. 2020 
22:30)  









          Name      Value     Range     Interpretation Code Description Data Karlie

rce(s) Supporting 

Document(s)

 

                                                                       









                    ID                  Date                Data Source

 

                    [id number]     2020 08:34:00 PM EDT [facility]









          Name      Value     Range     Interpretation Code Description Data Karlie

rce(s) Supporting 

Document(s)

 

             Choriogonadotropin.intact [Units/volume] in Serum or Plasma 167826.

0 mIU/mL                           

                          [facility]     

 

                                                                         Interpr

etation:                           Less 

than 5 mU/mL: Negative                 6-10 mU/mL: Borderline (suggest repeat in
 48 hours)                                 >10: Positive                     
Approx HCG range (mU/mL) Weeks post LMP                           5.4-708 mU/mL 
 3-4 Weeks                           217-64634 mU/mL 5-6 Weeks                  
         4059-902089 mU/mL 7-8 Weeks                           99096-116368 
mU/mL 9-10 Weeks                           64458-31442 mU/mL 12-14 Weeks        
                   06657-51566 mU/mL 15-16 Weeks                           8240-
77063 mU/mL 17-18 Weeks 









                    ID                  Date                Data Source

 

                    [id number]     2020 08:14:00 PM EDT [facility]









          Name      Value     Range     Interpretation Code Description Data [facility]

rce(s) Supporting 

Document(s)

 

          COMPREHENSIVE METABOLIC PANEL                                         

[facility]  

 

                                            COMPREHENSIVE METABOLIC PANEL 

 

           Sodium [Moles/volume] in Serum or Plasma 138 mEq/L  134 - 153        

                [facility]                                 

 

           Potassium [Moles/volume] in Serum or Plasma 3.5 mEq/L  3.6 - 5.0  L  

                   [facility]                            

 

           Chloride [Moles/volume] in Serum or Plasma 103 mEq/L  98 - 107       

                  [facility]                                 

 

           Carbon dioxide, total [Moles/volume] in Serum or Plasma 23 MEQ/L   22

 - 30                          

[facility]                   

 

           Glucose [Mass/volume] in Serum or Plasma 99 MG/DL   65 - 110         

                [facility]                                 

 

          BUN       6 MG/DL   7 - 21    L                   [facility]  

 

           Creatinine [Mass/volume] in Serum or Plasma 0.6 MG/DL  0.7 - 1.5  L  

                   [facility]                            

 

          BUN/CREAT 10        8 - 27                        [facility]  

 

           Protein [Mass/volume] in Serum or Plasma 7.2 G/DL   6.3 - 8.2        

                [facility]                                 

 

           Albumin [Mass/volume] in Serum or Plasma 4.5 G/DL   3.9 - 5.0        

                [facility]                                 

 

           Globulin [Mass/volume] in Serum by calculation 2.7 GM/DL  2.4 - 3.2  

                      [facility]                            

 

          A/G RATIO 1.7       0.8 - 2.0                     [facility]  

 

           Calcium [Mass/volume] in Serum or Plasma 9.6 MG/DL  8.4 - 10.2       

                [facility]                                 

 

           Bilirubin.total [Mass/volume] in Serum or Plasma <0.7 MG/DL 0.2 - 1.3

                        

[facility]                   

 

                    Alkaline phosphatase [Enzymatic activity/volume] in Serum or

 Plasma 61 U/L              38 - 

126                                             [facility]  

 

                          Aspartate aminotransferase [Enzymatic activity/volume]

 in Serum or Plasma 16 U/L

             5 - 40                                 [facility]  

 

                    Alanine aminotransferase [Enzymatic activity/volume] in Seru

m or Plasma 36 U/L              7

- 56                                            [facility]  

 

           Anion gap 3 in Serum or Plasma 12.0 mmol/L 8.0 - 16.0                

       [facility]

                                         

 

          AGE       24 yrs                                  [facility]  

 

          NON-AA GFR >60 mL/min                               [facility]

ital  

 

          AFR AMER GFR >60 mL/min                               [facility]

spital  

 

                                                                     Male GFR In

terprentation                  20-49 yrs

    >60 mL/min   Normal                  50-59 yrs     >56 mL/min   Normal      
           60-69 yrs     >49 mL/min   Normal                  70-79yrs      >42 
mL/min   Normal                  80 and above  >35 mL/min   Normal              
     Female GFR  Interpretation                  20-39 yrs     >60 mL/min   
Normal                  40-49 yrs     >58 mL/min   Normal                  50-59
yrs     >51 mL/min   Normal                  60-69 yrs     >45 mL/min   Normal  
               70-79 yrs     >39 mL/min   Normal                  80 and above  
>32 mL/min   Normal 









                    ID                  Date                Data Source

 

                    [id number]     2020 08:10:00 PM EDT [facility]









          Name      Value     Range     Interpretation Code Description Data Karlie

rce(s) Supporting 

Document(s)

 

          CBC W/AUTOMATED DIFF                                         [facility]  

 

                                            COMPLETE BLOOD COUNT 

 

           Leukocytes [#/volume] in Blood by Automated count 8.6 10^3/uL 4.2 - 1

1.0                       

[facility]                   

 

             Erythrocytes [#/volume] in Blood by Automated count 4.91 10^6/uL 4.

20 - 5.40                

                          [facility]     

 

           Hemoglobin [Mass/volume] in Blood 12.9 g/dL  12.0 - 16.0             

          [facility]                                 

 

           Hematocrit [Volume Fraction] of Blood by Automated count 39.8 %     3

7.0 - 47.0                       

[facility]                   

 

                    Erythrocyte mean corpuscular volume [Entitic volume] by Auto

mated count 81.1 fL             

81.0 - 101                                      [facility]  

 

                          Erythrocyte mean corpuscular hemoglobin [Entitic mass]

 by Automated count 26.3 

pg           27.0 - 34.0  L                         [facility]  

 

                                        Erythrocyte mean corpuscular hemoglobin 

concentration [Mass/volume] by Automated

 count     32.4 g/dL  31.0 - 36.0                       [facility]  

 

             Erythrocyte distribution width [Ratio] by Automated count 15.1 %   

    11.5 - 14.5  H             

                          [facility]     

 

           Platelets [#/volume] in Blood by Automated count 256 10^3/uL 150 - 45

0                        

[facility]                   

 

                    Platelet mean volume [Entitic volume] in Blood by Automated 

count 11.0 fL             7.4 - 

10.4            H                               [facility]  

 

           Neutrophils/100 leukocytes in Blood by Automated count 69.4 %     37.

0 - 80.0                       

[facility]                   

 

           Lymphocytes/100 leukocytes in Blood by Manual count 21.1 %     25.0 -

 40.0 L                     

[facility]                   

 

           Monocytes/100 leukocytes in Blood by Automated count 6.3 %      3.0 -

 8.0                        

[facility]                   

 

           Eosinophils/100 leukocytes in Blood by Automated count 2.0 %      0.0

 - 7.0                        

[facility]                   

 

           Basophils/100 leukocytes in Blood by Automated count 0.9 %      0.0 -

 2.5                        

[facility]                   

 

          %IG       0.3 %     0.0 - 0.0 H                   [facility]

al  

 

          %NRBC     0.0 %     0.0 - 0.0                     [facility]

al  

 

           Neutrophils [#/volume] in Blood by Automated count 5.95 10^3/uL 2.00 

- 6.90                       

[facility]                   

 

           Lymphocytes [#/volume] in Blood by Automated count 1.81 10^3/uL 0.60 

- 3.40                       

[facility]                   

 

           Monocytes [#/volume] in Blood by Automated count 0.54 10^3/uL 0.00 - 

0.90                       

[facility]                   

 

           Eosinophils [#/volume] in Blood by Automated count 0.17 10^3/uL 0.00 

- 0.70                       

[facility]                   

 

           Basophils [#/volume] in Blood by Automated count 0.08 10^3/uL 0.00 - 

0.20                       

[facility]                   

 

          #IG       0.03 10^3/uL 0.00 - 0.10                     [facility]

ospital  

 

          #NRBC     0.00 10^3/uL 0.00 - 0.00                     [facility]

ospital  

 

          MANUAL DIFF NOT INDICATED                               [facility]  

 

          RBC MORPH NOT INDICATED                               [facility]

spital  









                    ID                  Date                Data Source

 

                    [id number]     2020 08:08:00 PM EDT [facility]









          Name      Value     Range     Interpretation Code Description Data Karlie

rce(s) Supporting 

Document(s)

 

           Magnesium [Mass/volume] in Serum or Plasma 1.9 MG/DL  1.7 - 2.2      

                  [facility]                                









                    ID                  Date                Data Source

 

                    [id number]     2020 08:08:00 PM EDT [facility]









          Name      Value     Range     Interpretation Code Description Data Karlie

rce(s) Supporting 

Document(s)

 

           Lipase [Enzymatic activity/volume] in Serum or Plasma 13 U/L     13 -

 60                          

[facility]                   









                    ID                  Date                Data Source

 

                    [id number]      2020 06:07:00 PM EDT [facility]

 

                                                                                

                                        

                1                                          OrderSheet           
                        [facility]                                  
 Emergency Department                              [address] [place] [address]                                Phone #: [phone] ext- [phone]       
                                2020 18:05                      
----------------------------------------------------                            
     Patient: ODILIA SANTIAGO                                MRN: [id number]      
Acct#: [id number]                              Sex: F : 1995 Age: 
24yWEIGHT:73.0 kg (S) HEIGHT:66 inches (S) BMI:26.0ALLERGIES: NoneCHIEF 
COMPLAINT: abd painDIAGNOSIS: O/E - dehydrated, HypokalemiaLAB ORDERSOrder 
Description      Priority      Entered              Acknowledged        
InitialedCBC w Diff             STAT          18:33 2020                  
      18:37 Caity Ocampo MD;        
               ED Tech, Eh ER                                                
                            [id number]                    STAT          18:33 
2020                         18:37 Caity Ocampo MD;                        ED Tech, Eh ER                   
                                                         [id number]            
    STAT          18:33 2020                         18:37 Caity Ocampo MD;                        ED Tech, 
Eh ER                                                                        
    [id number] (Clean      STAT          18:33 2020                  
      18:33 Alfonso,Catch)                              Caity Fair MD;      
                 Dacia R.N.Magnesium              STAT          18:40 
2020                         18:44 Nestor Hamilton Norma MD;                        Dacia R.N.DIAGNOSTIC STUDY 
ORDERSOrder Description  Priority     Entered                   Acknowledged    
   InitialedUS OB 1ST TRI UP   STAT         18:33 2020                    
         18:33 Alfonso,TO 14 WEEKS                    Caity Fair MD;        
                    Dacia R.N.(Oxygen?(No))(IV?(Yes))                  Reason 
for Study: abdominal painMEDICATION/IV/DRIP/FLUID ORDERSOrder Description    
Priority        Entered              Acknowledged        InitialedIV NS 1000 mL 
                      18:33 2020                         18:45 
Alfonso,Bolus : Bolus 1000                  Caity Fair MD;                  
     Dacia R.N.mL (X1)Zofran IVP 4 mg                      18:33 2020   
                     18:45 Nestor Hamilton Norma MD;                        Dacia R.N.IV NS 1000 mL                      
 19:53 2020                         20:23 Alfonso,Bolus : Bolus 1000     
            Caity Fair MD;                        Dacia R.N.               
                                                                                
 2                                           OrderSheet                         
           [facility]                                     Emergency 
Department                               [address] [place] [address]   
                             Phone #: [phone] ext- [phone]                  
                      2020 18:05                       ------------------
----------------------------------                                   Patient: 
ODILIA SANTIAGO                                 MRN: [id number]      Acct#: 
[id number]                               Sex: F : 1995 Age: 24ymL 
(X1)Potassium Chloride                     19:54 2020                     
  20:26 Alfonso,Liquid PO 40 meq                      Caity Fair MD;        
              Dacia REYES(NOW x1)GENERAL ORDERSOrder Description        
Priority      Entered                Acknowledged    Initialed[Electronically 
signed by Dacia Hamilton R.N. (2020)][Electronically signed by 
Caity Fair MD (01:18 2020)][Electronically locked by Dacia Hamilton R.N. (2020)]  









          Name      Value     Range     Interpretation Code Description Data Karlie

rce(s) Supporting 

Document(s)

 

                                                                       









                    ID                  Date                Data Source

 

                    [id number]      2020 06:07:00 PM EDT [facility]

 

                                                                                

                                        

1                           Medication Reconciliation Report                    
              [facility]                                   Emergency
Department                             [address] [place] [address]     
                         Phone #: [phone] ext- [phone]                      
                2020 18:05                     
----------------------------------------------------                            
    Patient: ODILIA SANTIAGO                               MRN: [id number]      
Acct#: [id number]                             Sex: F : 1995 Age: 
24yWeight:      73.0 kgHeight/Length:      66 in.BMI:         26.0ALLERGIES: 
NoneThe patient's Home Medications are listed below:NONE.The source(s) of the 
original Home Medication information:Not obtained.The following Medications were
given to the patient in the Emergency Department:NS [IV] IV Fluids bolus 1000 mL
 wide open, administered: 2020 6:45:00 PMZofran [IVP] IVP 4 mg, 
administered: 2020 6:45:00 PMNS [IV] IV Fluids bolus 1000 mL wide open, 
administered: 2020 8:23:00 PMPOTASSIUM CHLORIDE LIQUID PO PO 40 meq, 
administered: 2020 8:26:00 PMThe following Medications were prescribed to 
the patient:None.  









          Name      Value     Range     Interpretation Code Description Data Karlie

rce(s) Supporting 

Document(s)

 

                                                                       









                    ID                  Date                Data Source

 

                    [id number]      2020 06:07:00 PM EDT [facility]

 

                                                                                

                                        

                 1                               Medication Administration 
Record                                        [facility]            
                            Emergency Department                                
  [address] [place] [address]                                    Phone 
#: [phone] ext- [phone]                                            
2020 18:05                          
----------------------------------------------------                            
          Patient: ODILIA SANTIAGO                                    MRN: 
[id number]      Acct#: [id number]                                  Sex: F : 
1995 Age: 24yWeight: 73.0 kgHeight/Length: 66 inBMI:    26ALLERGIES:      
 None      Date/Time                     Medication Administered          
Medication OrderedStart                          NS [IV]                        
    IV NS 1000 mL Bolus : Bolus [id number]:45 2020               Dose: IV 
Fluids                    mL (X1)Dacia Hamilton RJEREMI    Bolus: 1000 mL wide 
open----                           Dispensed: 1000 mL bagStop                   
        Site: #1 left wrist20:26 2020Dacia Hamilton RJEREMIGiven         
                 ZOFRAN [IVP] (ONDANSETRON HCL)     Zofran IVP 4 mg18:45 
2020               Dose: 4 mg IVPMattDacia mcghee R.N.    Site: #1 left 
wristStart                          NS [IV]                            IV NS 
1000 mL Bolus : Bolus [id number]:23 2020               Dose: IV Fluids        
            mL (X1)Dacia Hamilton R.N.    Bolus: 1000 mL wide open----      
                     Dispensed: 1000 mL bagStop                           Site: 
#1 left wrist21:27 2020Dacia Hamilton R.N.Given                       
   POTASSIUM CHLORIDE LIQUID PO       Potassium Chloride Liquid PO 4020:26 
2020               Dose: 40 meq Oral Suspension PO    meq (NOW 
x1)Dacia Hamilton R.N.  









          Name      Value     Range     Interpretation Code Description Data Karlie

rce(s) Supporting 

Document(s)

 

                                                                       









                    ID                  Date                Data Source

 

                    [id number]      2020 06:07:00 PM EDT [facility]

 

                                                                                

                                        

                      1                                      General 
Instructions                                       [facility]       
                                Emergency Department                            
     [address] [place] [address]                                   
Phone #: [phone] ext- [phone]                                           
2020 18:05                         
----------------------------------------------------                            
         Patient: ODILIA SANTIAGO                                   MRN: [id number]
      Acct#: [id number]                                 Sex: F : 1995 
Age: 24yMild dehydrationHypokalemiaINSTRUCTIONSNo strenuous activity.Your 
Current Medications: .No home medication.Understanding of the discharge 
instructions verbalized by patient.                                    
ADDITIONAL INFORMATIONDehydration (Adult)Dehydration occurs when your body loses
 too much fluid. This may be the result of prolongedvomiting or diarrhea, 
excessive sweating, or a high fever. It may also happen if you don't drinkenough
 fluid when you're sick or out in the heat. Misuse of diuretics (water pills) 
can also be a cause.Symptoms include thirst and decreased urine output. You may 
also feel dizzy, weak, fatigued, or verydrowsy. The diet described below is 
usually enough to treat dehydration. In some cases, you mayneed medicine.Home 
care   Drink at least 12 8-ounce glasses of fluid every day to resolve the 
dehydration. Fluid may    include water; orange juice; lemonade; apple, grape, 
or cranberry juice; clear fruit drinks;    electrolyte replacement and sports 
drinks; and teas and coffee without caffeine. Don't drink    alcohol. If you 
have been diagnosed with a kidney disease, ask your doctor how much and    what 
types of fluids you should drink to prevent dehydration. If you have kidney 
disease, fluid    can build up in the body. This can be dangerous to your 
health.    If you have a fever, muscle aches, or a headache as a result of a 
cold or flu, you may take    acetaminophen or ibuprofen, unless another medicine
 was prescribed. If you have chronic liver    or kidney disease, or have ever 
had a stomach ulcer or gastrointestinal bleeding, talk with your    healthcare 
provider before using these medicines. Don't take aspirin if you are younger 
than 18                                                                         
                                                                      2         
                                          General Instructions                  
                           [facility]                               
              Emergency Department                                       [address] [place] [address]                                         Phone #:
 [phone] ext- [phone]                                                 
2020 18:05                               
----------------------------------------------------                            
               Patient: ODILIA SANTIAGO                                        
 MRN: [id number]      Acct#: [id number]                                       Sex: F 
: 1995 Age: 24y      and have a fever. Aspirin raises the chance for 
severe liver injury.Follow-up careFollow up with your healthcare provider, or as
 advised.When to seek medical adviceCall your healthcare provider right away if 
any of these occur:       Continued vomiting       Frequent diarrhea (more than 
5 times a day); blood (red or black color) or mucus in diarrhea       Blood in 
vomit or stool       Swollen abdomen or increasing abdominal pain       
Weakness, dizziness, or fainting       Unusual drowsiness or confusion       
Reduced urine output or extreme thirst       Fever of 100.4F (38C) or higher 
[id number] [employer]. [address] [place] [address]. 
All rights reserved. This information is not intended as asubstitute for 
professional medical care. Always follow your healthcare professional's 
instructions.HypokalemiaHypokalemia means a low level of potassium in the blood.
 This most often occurs in people who takewater pills (diuretics). It can also 
occur because of severe vomiting or diarrhea. You may also have itif you take 
laxatives for long periods of time. It sometimes happens if you have low 
magnesium(hypomagnesemia). If you have this, your healthcare provider will treat
 the low magnesium first.A mild case of hypokalemia usually causes no symptoms. 
It is only found with blood testing. Moresevere potassium loss causes overall 
weakness, muscle or abdominal cramps, rapid or irregularheartbeats (heart 
palpitations), low blood pressure, and muscle weakness.Home care     Take any 
potassium supplements as prescribed.      Eat foods rich in potassium. The 
highest amount is found in avocado, baked potatoes,      spinach, cantaloupe, 
cod, halibut, salmon, and scallops. White, red, or shane beans are also      ve
ry good sources. A modest amount of potassium is found in orange juice, bananas,
 carrots,                                                                       
                                                                        3       
                                            General Instructions                
                             [facility]                             
                Emergency Department                                       [address] [place] [address]                                         Phone #:
 [phone] ext- [phone]                                                 
2020 18:05                               
----------------------------------------------------                            
               Patient: ODILIA SANTIAGO                                        
 MRN: [id number]      Acct#: [id number]                                       Sex: F 
: 1995 Age: 24y      and tomato juice.      If you take certain types 
of diuretics, you will also need to take potassium supplements. If you      take
 a diuretic, discuss potassium supplements with your doctor.Follow-up careFollow
 up with your healthcare provider for a repeat blood test within the next week, 
or as advised byour staff.When to seek medical adviceCall your healthcare 
provider right away if any of the following occur:       Increased weakness, 
fatigue, or muscle cramps       DizzinessCall 911Call 911 if any of the 
following occur:       Irregular heartbeat, extra beats, or very fast heart rate
       Loss of consciousness [id number] 10[employer] [employer]. [address] [place] [address], [place] [address]. All rights reserved. This information is not 
intended as asubstitute for professional medical care. Always follow your 
healthcare professional's instructions. You have been given the following 
additional information: Dehydration (Adult) Hypokalemia No strenuous 
activity.(Electronically signed by Caity Fair MD 2020 01:18)  









          Name      Value     Range     Interpretation Code Description Data Karlie

rce(s) Supporting 

Document(s)

 

                                                                       









                    ID                  Date                Data Source

 

                    [id number]      2020 06:07:00 PM EDT [facility]

 

                                                                                

                                        

                           1                                   Clinical Report -
 Nurses                                    [facility]               
                     Emergency Department                              [address] [place] [address]                                Phone #: [phone] ext- [phone]                                        2020 18:05      
                ----------------------------------------------------            
                      Patient: ODILIA SANTIAGO                                
MRN: [id number]      Acct#: [id number]                              Sex: F : 
1995 Age: 24yTRIAGEArrived by private vehicle. Historian: patient. 
Unaccompanied. ( 7 weeks pregnant with vomit).Acuity: LEVEL 3.Chief Complaint: 
ABDOMINAL PAIN and (vomit).Alert. No acute distress.This started last night. 
Onset. (). She has had vomiting.Treatment PTA:None.SEPSIS SCREEN: SIRS 
Screen negative. Sepsis Screen negative. No suspected or confirmed signs 
ofinfection present. --18:24 20 Tameka Calix R.N.18:21 20. BP: 130/66.
 MAP: 87. HR: 64. RR: 18. O2 saturation: 100% on room air. Temp: 98.2 F.Pain 
level now: 510. --18:24 20 Tameka Calix R.N.Weight: 73 kg stated. 
Height/Length: 66 inches Per Patient. BMI: 26. --18:20 20 Tameka Calix R.N.MedicationsNone. --18:22 20 Tameka Calix R.N.AllergiesNone. --18:22 
20 Tameka Calix R.N.PROBLEMS:Psoriasis.Pregnant. --18:22 20 Tameka Calix R.N.ADDITIONAL SURGERIES: (X2).Elbow wash out. --18:22 20 Tameka Calix R.N.HistoryPAST MEDICAL HX: Immunizations: up-to-date. Last normal 
menstrual period- May 29.  4. Para2. OB history: G 4; P 2.SOCIAL HX: 
Never smoker. No alcohol use or drug use. She was offered HIV testing but 
declined andhepatitis C testing but declined. She has not traveled outside the 
[place]Infectious disease exposure: No infectious disease exposure. Patient is not 
a known carrier of tuberculosis,hepatitis, HIV, MRSA or VRE. Patient is not a 
known carrier of CRE.                                                           
                                                      2                         
            Clinical Report - Nurses                                     
[facility]                                     Emergency Department 
                              [address] [place] [address]              
                   Phone #: [phone] ext- [phone]                            
             2020 18:05                       -------------------
---------------------------------                                   Patient: 
ODILIA SANTIAGO                                 MRN: [id number]      Acct#: 
[id number]                               Sex: F : 1995 Age: 24y SELF HARM
 ASSESSMENT: Self harm assessment was performed. The patient answered "no" to 
the question(s) "Have you recently felt down, depressed, or hopeless?", &quot;Do
 you have thoughts of harming or killing yourself?", "Do you have a plan for 
harming or killing yourself?", "Have you recently had thoughts about harming or 
killing others?", "Do you have any dangerous items in your possession?", "Have 
you noticed less interest or pleasure in doing things?", "Are you here because 
you tried to hurt yourself?" and "Have you ever tried to hurt yourself before 
today?". ABUSE ASSESSMENT: Abuse assessment. Abuse denied. No suspicion of 
abuse. No report of abuse. NUTRITIONAL RISK ASSESSMENT: The nutritional risk 
assessment revealed no deficiencies. FUNCTIONAL ASSESSMENT: Functional 
assessment: no impairments noted. LEARNING NEEDS ASSESSMENT: The learning needs 
assessment revealed no barriers. FALL RISK ASSESSMENT: Fall risk assessment 
completed. No risk factors identified. SKIN INTEGRITY ASSESSMENT: Skin integrity
 risk assessment completed. No skin integrity risk identified. --18:24 20 
Tameka Calix R.N. Interventions Identification band on patient. To treatment 
room. --18:24 20 Tameka Calix R.N.PHYSICAL ASSESSMENTGENERAL / NEURO / 
PSYCH: Alert. Oriented X 4.RESPIRATORY: Respirations not labored.GI / : 
Abdomen soft and nontender. Bowel sounds within normal limits.EXTREMITIES: No 
lower extremity edema.SKIN: Skin is warm and dry. --18:49 20 Dacia Hamilton R.N.NURSING PROGRESS NOTESPatient gowned. Reassurance given. Two 
patient identifiers checked. Call light placed in reach. Siderails up x 2. Bed 
placed in lowest position. Patient ready for evaluation- ED physician and PA 
notified.--18:24 20 Tameka Calix R.N. 18:45 2020 Site #1 started via 
IV in the left wrist with an 20g angiocath, with aseptic technique and good 
blood return; one attempt. Saline lock flushed with 10 mL saline. --18:45 
20 Dacia Hamilton R.N. 18:45 2020 Started bag #1 1000 mL IV 
Fluids NS; bolus of 1000 mL wide open via site #1 via IV pump. Allergies 
verified and confirmed 5 rights. IV patency established. IV site checked: no 
pain, redness, or swelling. IV flushed thoroughly pre- and post-medication 
administration. Information reviewed with patient. Verbalizes understanding. 
--18:45 20 Dacia Hamilton R.N.                                         
                                                                        3       
                             Clinical Report - Nurses                           
          [facility]                                     Emergency 
Department                               [address] [place] [address]   
                              Phone #: [phone]- [phone]                 
                        2020 18:05                       
----------------------------------------------------                            
       Patient: ODILIA SANTIAGO                                 MRN: [id number]    
  Acct#: [id number]                               Sex: F : 1995 Age: 24y 
18:45 2020 Zofran (Ondansetron HCl) IVP 4 mg given over 5 minute(s) via 
site #1. Allergies verified and confirmed 5 rights. IV patency established. IV 
site checked: no pain, redness, or swelling. IV flushed thoroughly pre- and po
st-medication administration. Information reviewed with patient. Verbalizes 
understanding. --18:45 20 Dacia Hamilton R.N. 20:23 2020 Started 
bag #1 1000 mL IV Fluids NS; bolus of 1000 mL wide open via site #1 via IV pump.
 Allergies verified and confirmed 5 rights. IV patency established. IV site 
checked: no pain, redness, or swelling. IV flushed thoroughly pre- and post-
medication administration. Information reviewed with patient. Verbalizes 
understanding. --20:20 Dacia Hamilton R.N. 20:2020 
POTASSIUM CHLORIDE LIQUID PO PO Oral Suspension 40 meq given. Allergies verified
 and confirmed 5 rights. Information reviewed with patient. Verbalizes 
understanding. --20:20 Dacia Hamilton R.N. 20:2020 IV 
Fluids NS via IV site #1 Discontinued. Total amount infused: 1000 mL. IV patency
 established. IV site checked: no pain, redness, or swelling. IV flushed 
thoroughly. --20:20 Dacia Hamilton R.N. 21:2020 IV Fluids 
NS via IV site #1 Discontinued. Total amount infused: 1000 mL. IV patency 
established. IV site checked: no pain, redness, or swelling. IV flushed 
thoroughly. --:20 Dacia Hamilton R.N.DISPOSITION / DISCHARGE 
Departure time: :2020. No learning barriers present. Discharge 
instructions provided and reviewed with the patient. Reviewed diet. Patient 
verbalized understanding. Written instructions provided in English. The patient 
was discharged home. She left ambulatory and via private vehicle. Patient 
driving. --:20 Dacia Hamilton R.N. 21:20. BP: 109/76. HR:
 55. RR: 16. O2 saturation: 99%. Temp: 97.8 F. Pain level now 0/10. --21:20 Dacia Hamilton R.N.Locked/Released at 2020 21:28 by Dacia Hamilton R.N.  









          Name      Value     Range     Interpretation Code Description Data Karlie

rce(s) Supporting 

Document(s)

 

                                                                       









                    ID                  Date                Data Source

 

                    [id number]      2020 06:07:00 PM EDT [facility]

 

                                                                                

                                        

                             1                           Clinical Report - 
Physicians/Mid Levels                                       [facility]                                       Emergency Department             
                    [address] [place] [address]                        
           Phone #: [phone]                                    
       2020 18:05                         
----------------------------------------------------                            
         Patient: ODILIA SANTIAGO                                   MRN: [id number]
      Acct#: [id number]                                 Sex: F : 1995 
Age: 24y Arrived- By private vehicle. Historian- patient. Disposition decision: 
21:12 2020.HISTORY OF PRESENT ILLNESS Chief Complaint: vomiting. This 
started today and is still present. No contractions, pelvic pain, vaginal 
bleeding or discharge or complaint of leakage of fluid. (pt is a  who 
presents for evaluation of her nausea/vomiting. she states that she has mild 
abdominal cramps which are primarily during her vomiting episodes. she states 
she feels slightly dizzy. she denies any vaginal bleeding, diarrhea). Similar 
symptoms previously. Recent medical care: Not recently seen/assessed.REVIEW OF 
SYSTEMSThe patient has had nausea. She has had vomiting, nausea and vomiting. No
 diarrhea, black stools orstools, bloody stools or headache. No double vision or
 vision, fainting episodes, fever or eye discomfort.No eye discharge, sore 
throat or throat, cough or difficulty breathing. No chest pain or pain, skin 
rash,chills or joint pain. No chills, fever, ear drainage, nasal congestion or 
sinus pain. No calf pain, cough,difficulty breathing, constipation or diarrhea. 
No hematuria, back pain, neck pain, headache or diabeticsymptoms. No easy 
bruising, difficulty with urination or anxiety. She has had abdominal pain 
(crampsduring vomiting).PAST HISTORYSee nurses notes. Problems: Pregnant. 
Additional Surgeries: . Elbow wash out. Medications: None. Allergies: 
None.SOCIAL HISTORYNo alcohol use or drug use.                                  
                                                                               2
                            Clinical Report - Physicians/Mid Levels             
                          [facility]                                
       Emergency Department                                 [address] [place] [address]                                   Phone #: [phone] 
ext- [phone]                                           2020 18:05            
             ----------------------------------------------------               
                      Patient: ODILIA SANTIAGO                                 
  MRN: [id number]      Acct#: [id number]                                 Sex: F : 
1995 Age: 24yADDITIONAL NOTESThe nursing notes have been reviewed.PHYSICAL
 EXAMVital Signs: 2020 18:21 BP: 130/66. MAP: 87. HR: 64. RR: 18. O2 
saturation: 100% on room air.Temp: 98.2 F. Pain level now: 10. Have been 
reviewed and appear to be correct. Hypertensive.Mean arterial pressure- normal. 
Heart rate normal. Respiratory rate normal. Temperature normal.Oxygen saturation
 normal.Appearance: Alert. Oriented X3. No acute distress.HEENT: Normal external
 inspection.ENT: Pharynx normal.Neck: Neck supple.CVS: Heart sounds 
normal.Respiratory: No respiratory distress. Painless inspiration. Breath sounds
 normal. Chest nontender.Abdomen: Soft and nontender. Bowel sounds normal. No m
ass.Back: Normal external inspection.Skin: Skin warm and dry. Normal skin color.
 No rash. Normal skin turgor.Extremities: Extremities nontender. No pathologic 
edema.Neuro: Oriented X 3. Mood/affect normal. No motor deficit.LABS, X-RAYS, 
AND EKGLaboratory Tests: US OB TRANSVAGINAL PREGNANT [place]:     (GUERITA: 2020 
19:08)            ( MsgRcvd 2020 20:54) In Progress US TRANSVAGINAL 
PREGNANT [place] REASON FOR OBS: ABDOMINAL PAIN TRANSPORTATION: A                   
     IV? N      O2?   N PREGNANCY STATUS: YES                  ISOLATION   N 
Magnesium:      (GUERITA: 2020 18:47)           ( MsgRcvd 2020 19:16) 
Final results    **Test**                                 **Result**        
**Flag**   **Units**       **(Reference)**    MAGNESIUM                         
       1.8                          MG/DL            (1.7 - 2.2) CBC w Diff:    
 (GUERITA: 2020 18:47)           ( MsgRcvd 2020 18:57) Final results   
 **Test**                                 **Result**        **Flag**   **Units**
       **(Reference)**    CBC W/AUTOMATED DIFF        COMPLETE BLOOD COUNT    
WBC                                      9.2                          10/uL     
     (4.2 - 11.0)    RBC                                      4.62              
           10/uL          (4.20 - 5.40)    HEMOGLOBIN                           
    12.1                         g/dL             (12.0 - 16.0)    HEMATOCRIT   
                            37.7                         %                (37.0 
- 47.0)    MCV                                      81.6                        
 fL               (81.0 - 101)    MCH                                      26.2 
             L          pg               (27.0 - 34.0)    MCHC                  
                   32.1                         g/dL             (31.0 - 36.0)  
  RDW                                      15.5              H          %       
         (11.5 - 14.5)    PLATELETS                                220          
                10/uL          (150 - 450)    MPV                               
       11.0              H          fL               (7.4 - 10.4)    NEUT       
                              61.8                         %                
(37.0 - 80.0)                                                                   
                                             3                         Clinical 
Report - Physicians/Mid Levels                                    [facility]                                    Emergency Department                
              [address] [place] [address]                              
  Phone #: [phone]                                        
2020 18:05                      
----------------------------------------------------                            
      Patient: ODILIA SANTIAGO                                MRN: [id number]      
Acct#: [id number]                              Sex: F : 1995 Age: 24y 
LYMPH                                      20.5            L            %       
        (25.0 - 40.0) MONO                                       5.7            
              %               (3.0 - 8.0) EOS                                   
     11.0            H            %               (0.0 - 7.0) BASO              
                         0.7                          %               (0.0 - 
2.5) %IG                                        0.3             H            %  
             (0.0 - 0.0) %NRBC                                      0.0         
                 %               (0.0 - 0.0) #NEUT                              
        5.69                         10/uL         (2.00 - 6.90) #LYMPH         
                            1.89                         10/uL         (0.60 - 
3.40) #MONO                                      0.52                         
10/uL         (0.00 - 0.90) #EOS                                       1.01     
       H            10/uL         (0.00 - 0.70) #BASO                           
           0.06                         10/uL         (0.00 - 0.20) #IG         
                               0.03                         10/uL         (0.00 
- 0.10) #NRBC                                      0.00                         
10/uL         (0.00 - 0.00) MANUAL DIFF                                NOT 
INDICATED RBC MORPH                                  NOT INDICATEDCMP:      
(GUERITA: 2020 18:47)          ( MsgRcvd 2020 19:32) Final results 
**Test**                                   **Result**      **Flag**     
**Units**      **(Reference)** COMPREHENSIVE METABOLIC PANEL     COMPREHENSIVE 
METABOLIC PANEL SODIUM                                    136                   
       mEq/L             (134 - 153) POTASSIUM                                 
3.4              L           mEq/L             (3.6 - 5.0) CHLORIDE             
                     103                          mEq/L             (98 - 107) 
CO2                                       21               L           MEQ/L    
         (22 - 30) GLUCOSE                                   96                 
          MG/DL             (65 - 110) BUN                                      
 4                L           MG/DL             (7 - 21) CREATININE             
                   0.6              L           MG/DL             (0.7 - 1.5) 
BUN/CREAT                                 7                L                    
         (8 - 27) TOTAL PROTEIN                             6.5                 
         G/DL              (6.3 - 8.2) ALBUMIN                                  
 4.3                          G/DL              (3.9 - 5.0) GLOBULIN            
                      2.2              L           GM/DL             (2.4 - 3.2)
 A/G RATIO                                 2.0                                  
          (0.8 - 2.0) CALCIUM                                   9.0             
             MG/DL             (8.4 - 10.2) TOTAL BILI                          
      <0.7                         MG/DL             (0.2 - 1.3) ALKALINE PHOS  
                           54                           U/L               (38 - 
126) SGOT/AST                                  12                           U/L 
              (5 - 40) SGPT/ALT                                  11             
              U/L               (7 - 56) ANION GAP                              
   12.0                         mmol/L            (8.0 - 16.0) AGE              
                         24                           yrs NON-AA GFR            
                    >60                          mL/min AFR AMER GFR            
                  >60                          mL/min                           
     Male GFR Interprentation                   20-49 yrs       >60 mL/min    
Pbahpi43-10 yrs     >56 mL/min   Normal                   60-69 yrs     >49 
mL/min    Normal                    70-79yrs>42 mL/min   Normal                 
  80 and above >35 mL/min     Normal                      Female 
GFRInterpretation                   20-39 yrs      >60 mL/min   Normal          
          40-49 yrs      >58 mL/minNormal                   50-59 yrs     >51 
mL/min    Normal                   60-69 yrs      >45 mL/min    Nalmpx77-04 yrs 
    >39 mL/min    Normal                   80 and above >32 mL/min     
NormalLipase:     (GUERITA: 2020 18:47)               ( MsgRcvd 2020 
19:16) Final results **Test**                                   **Result**      
**Flag**     **Units**      **(Reference)** LIPASE                              
       13                           U/L             (13 - 60)Urinalysis:      
(GUERITA: 2020 19:30)          ( MsgRcvd 2020 19:59) Final results 
**Test**                                   **Result**      **Flag**     
**Units**      **(Reference)** URINALYSIS     URINALYSIS                        
                                                                                
                 4                            Clinical Report - Physicians/Mid 
Levels                                        [facility]            
                            Emergency Department                                
  [address] [place] [address]                                    Phone 
#: [phone]- [phone]                                            
2020 18:05                          
----------------------------------------------------                            
          Patient: ODILIA SANTIAGO                                    MRN: 
[id number]      Acct#: [id number]                                  Sex: F : 
1995 Age: 24y     SOURCE                                    R      COLOR  
                                  yellow                                        
      (NORMAL: Yello      CLARITY                                  clear        
                                       (NORMAL: Clear      SPEC GRAVITY         
                    1.010                                               (1.001 -
 1.030      pH                                       8                          
                         (5 - 9)      GLUCOSE                                  
NORM                                                (NORMAL: Negat      
BILIRUBIN                                NEG                                    
             (NORMAL: Negat      KETONE                                   15    
               A                              (NORMAL: Negat      PROTEIN       
                           NEG                                                 
(NORMAL: Negat      NITRITE                                  NEG                
                                 (NORMAL: Negat      BLOOD                      
              NEG                                                 (NORMAL: Negat
      LEUK EST                                 NEG                              
                   (NORMAL: Negat      UROBILINOGEN                             
NOR                                                 (less than 1.0      
MICROSCOPIC                              Not Indicate  US OB 1ST TRI UP TO 14 
WEEKS:   (GUERITA: 2020 18:33)                        ( MsgRcvd 2020 
20:54) In Progress  US OB 1ST TRI UP TO 14 WEEKS  Reason(s): abdominal pain  
TRANSPORTATION: S                      IV?    IV?(Yes)      O2?       Oxygen
?(No)    Ro.PROGRESS AND PROCEDURESCourse of Care: pt is approx 8 weeks 
pregnant. she has not seen her ob doctor yet. she has her firstappt on . she 
states she has been vomiting. she has mild abdominal cramps during emesis. 
labsshow dehydration. mild hypokalemia. pt given po potassium and 2 L NS. pt 
felt better. urine shows noevidence of infection but has ketones. US shows iup 6
 weeks 5 days. hr 130. I discussed results withpt. pt discharged. I encouraged 
pt to f/u with ob before . pt also encouraged to return if worse or anynew 
symptoms. Pt states she has prescription for zofran at the pharmacy that she 
needs to . theprescription is on  base.  Patient/family 
counseled.  Disposition: Discharged. Condition: good and stable.CLINICAL 
IMPRESSION Mild dehydration HypokalemiaINSTRUCTIONS No strenuous activity.  Your
 Current Medications: .  No home medication.                                    
                                            5                          Clinical 
Report - Physicians/Mid Levels                                     [facility]                                     Emergency Department              
                 [address] [place] [address]                           
      Phone #: [phone] ext- [phone]                                         
2020 18:05                       
----------------------------------------------------                            
       Patient: ODILIA SANTIAGO                                 MRN: [id number]    
  Acct#: [id number]                               Sex: F : 1995 Age: 24y 
Understanding of the discharge instructions verbalized by 
patient.(Electronically signed by Caity Fair MD 2020 01:18)  









          Name      Value     Range     Interpretation Code Description Data Karlie

rce(s) Supporting 

Document(s)

 

                                                                       









                    ID                  Date                Data Source

 

                    [id number]     2020 03:15:00 [place] [address] PHONE: [phone]

 FAX: [phone]  Name .................. : PAMELA MCKEON              Acct 
Number.................. : [id number] ROOM. ................. : VT             
           MR Number ................... : [id number] Stay type ............. : E/R 
                         Discharge Date......... ... : 20 Admit Date 
......... : 20                        Admit Phys .................... : 
COONEYNORM Date of Birth ....... : 1995                     Family Phys 
................... : UNKNOWN  Phone .................. : [phone]        
        Age ................................ : 24 Film# .................. 
.:[id number]                      Sex ................................. : F  
Unsigned transcriptions are preliminary reports and do not represent a medical 
or legal document      OB TRANSVAGINAL PREGNANT U [id number]  COMPLETE:20 
20:54 ADB [id number]                      (REASON FOR OBS: ABDOMINAL PAIN     TR
ANSVAGINAL OB ULTRASOUND:  HISTORY: Abdominal pain.  FINDINGS: There is a single
 viable intrauterine pregnancy with mean gestational age of 6 weeks 5 days. EDC 
is 3/6/21.  There are no abnormalities at the implantation site. There are 2 
small Nabothian cysts noted.  Fetal heart rate is 130 beats per minute.  
IMPRESSION: Single viable intrauterine pregnancy with mean gestational age of 6 
weeks 5 days.   ___________________________________ Electronically Reviewed and 
Signed By Andre Levine MD               , 20 15:15, MRA  Transcribe 
Initials: DZ , Transcribe Date: 20 01:28, Dictation Date:   Copy for: 
EMERGENCY DEPT                via modem Copy for: 710 MED REC DISCHARGED        
                                                                                
          Page 1 of 1   









          Name      Value     Range     Interpretation Code Description Data Karlie

rce(s) Supporting 

Document(s)

 

                                                                       









                    ID                  Date                Data Source

 

                    [id number]     2020 03:15:00 PM EDT [facility] [address] [place] [address] PHONE: [phone]

 FAX: [phone]  Name .................. : PAMELA ODILIA MCKEON              Acct 
Number.................. : [id number] ROOM. ................. : VT-02             
            Number ................... : [id number] Stay type ............. : E/R 
                         Discharge Date......... ... : 20 Admit Date 
......... : 20                        Admit Phys .................... : 
COONEYNORM Date of Birth ....... : 1995                     Family Phys 
................... : UNKNOWN  Phone .................. : [phone]        
        Age ................................ : 24 Film# .................. 
.:[id number]                      Sex ................................. : F  
Unsigned transcriptions are preliminary reports and do not represent a medical 
or legal document        US OB 1ST TRI UP TO 14 WEEKS [id number]      
COMPLETE:20 20:54 ADB [id number]                                 Reason(s): 
abdominal pain     TRANSABDOMINAL OB ULTRASOUND:  HISTORY: Abdominal pain.  
COMPARISON: 20  FINDINGS: There is a single viable intrauterine pregnancy 
with mean gestational age of 6 weeks 2 days. EDC is 3/9/21.  Fetal heart rate is
 130 beats per minute.  IMPRESSION: Single viable intrauterine pregnancy with 
mean gestational age of 6 weeks 2 days. Normal fetal growth has occurred.   
___________________________________ Electronically Reviewed and Signed By 
Andre Levine MD               , 20 15:15, MRA  Transcribe Initials: TELLY 
, Transcribe Date: 20 01:26, Dictation Date:   Copy for: EMERGENCY DEPT   
             via modem Copy for: 710 MED REC DISCHARGED                         
                                                                         Page 1 
of 1   









          Name      Value     Range     Interpretation Code Description Data Karlie

rce(s) Supporting 

Document(s)

 

                                                                       









                    ID                  Date                Data Source

 

                    [id number]     2020 07:59:00 PM EDT [facility]









          Name      Value     Range     Interpretation Code Description Data Karlie

rce(s) Supporting 

Document(s)

 

          URINALYSIS                                         [employer] [facility]  

 

                                            URINALYSIS 

 

          SOURCE    R                                       [facility]

al  

 

          COLOR     yellow    NORMAL: Yellow                     [facility]

ospital  

 

          CLARITY   clear     NORMAL: Clear                     [facility]

[facility]ital  

 

           Specific gravity of Urine by Test strip 1.010      1.001 - 1.030     

                  [facility]                                 

 

          pH        8         5 - 9                         [facility]

al  

 

           Glucose [Mass/volume] in Urine by Test strip NORM       NORMAL: Negat

[facility]                            

 

           Bilirubin.total [Presence] in Urine by Test strip NEG        NORMAL: 

Negative                       

[facility]                   

 

           Ketones [Presence] in Urine by Test strip 15         NORMAL: Negative

 A                     [facility]                                 

 

           Protein [Mass/volume] in Urine by Test strip NEG        NORMAL: Negat

[facility]                            

 

           Nitrite [Presence] in Urine by Test strip NEG        NORMAL: Negative

                       [facility]                                

 

          BLOOD     NEG       NORMAL: Negative                     [facility]  

 

           Leukocyte esterase [Presence] in Urine by Test strip NEG        CAITY

L: Negative                       

[facility]                   

 

           Urobilinogen [Mass/volume] in Urine by Test strip NOR        less radha

n 1.0 mg/dL                       

[facility]                   

 

          MICROSCOPIC Not Indicate                               [facility]

ospital  









                    ID                  Date                Data Source

 

                    [id number]     2020 07:32:00 PM EDT [facility]









          Name      Value     Range     Interpretation Code Description Data Karlie

rce(s) Supporting 

Document(s)

 

          COMPREHENSIVE METABOLIC PANEL                                         

[facility]  

 

                                            COMPREHENSIVE METABOLIC PANEL 

 

           Sodium [Moles/volume] in Serum or Plasma 136 mEq/L  134 - 153        

                [facility]                                 

 

           Potassium [Moles/volume] in Serum or Plasma 3.4 mEq/L  3.6 - 5.0  L  

                   [facility]                            

 

           Chloride [Moles/volume] in Serum or Plasma 103 mEq/L  98 - 107       

                  [facility]                                 

 

           Carbon dioxide, total [Moles/volume] in Serum or Plasma 21 MEQ/L   22

 - 30    L                     

[facility]                   

 

           Glucose [Mass/volume] in Serum or Plasma 96 MG/DL   65 - 110         

                [facility]                                 

 

          BUN       4 MG/DL   7 - 21    L                   [facility]

al  

 

           Creatinine [Mass/volume] in Serum or Plasma 0.6 MG/DL  0.7 - 1.5  L  

                   [facility]                            

 

          BUN/CREAT 7         8 - 27    L                   [facility]

al  

 

           Protein [Mass/volume] in Serum or Plasma 6.5 G/DL   6.3 - 8.2        

                [facility]                                 

 

           Albumin [Mass/volume] in Serum or Plasma 4.3 G/DL   3.9 - 5.0        

                [facility]                                 

 

           Globulin [Mass/volume] in Serum by calculation 2.2 GM/DL  2.4 - 3.2  

L                     [facility]                            

 

          A/G RATIO 2.0       0.8 - 2.0                     [facility]

al  

 

           Calcium [Mass/volume] in Serum or Plasma 9.0 MG/DL  8.4 - 10.2       

                [facility]                                 

 

           Bilirubin.total [Mass/volume] in Serum or Plasma <0.7 MG/DL 0.2 - 1.3

                        

[facility]                   

 

                    Alkaline phosphatase [Enzymatic activity/volume] in Serum or

 Plasma 54 U/L              38 - 

126                                             [facility]  

 

                          Aspartate aminotransferase [Enzymatic activity/volume]

 in Serum or Plasma 12 U/L

             5 - 40                                 [facility]  

 

                    Alanine aminotransferase [Enzymatic activity/volume] in Seru

m or Plasma 11 U/L              7

- 56                                            [facility]  

 

           Anion gap 3 in Serum or Plasma 12.0 mmol/L 8.0 - 16.0                

       [facility]

                                         

 

          AGE       24 yrs                                  [facility]

al  

 

          NON-AA GFR >60 mL/min                               [facility]

ital  

 

          AFR AMER GFR >60 mL/min                               [facility]

spital  

 

                                                                     Male GFR In

terprentation                  20-49 yrs

    >60 mL/min   Normal                  50-59 yrs     >56 mL/min   Normal      
           60-69 yrs     >49 mL/min   Normal                  70-79yrs      >42 
mL/min   Normal                  80 and above  >35 mL/min   Normal              
     Female GFR  Interpretation                  20-39 yrs     >60 mL/min   
Normal                  40-49 yrs     >58 mL/min   Normal                  50-59
yrs     >51 mL/min   Normal                  60-69 yrs     >45 mL/min   Normal  
               70-79 yrs     >39 mL/min   Normal                  80 and above  
>32 mL/min   Normal 









                    ID                  Date                Data Source

 

                    [id number]     2020 07:16:00 PM EDT [facility]









          Name      Value     Range     Interpretation Code Description Data Karlie

rce(s) Supporting 

Document(s)

 

           Magnesium [Mass/volume] in Serum or Plasma 1.8 MG/DL  1.7 - 2.2      

                  [facility]                                









                    ID                  Date                Data Source

 

                    [id number]     2020 07:16:00 PM T [facility]









          Name      Value     Range     Interpretation Code Description Data Karlie

rce(s) Supporting 

Document(s)

 

           Lipase [Enzymatic activity/volume] in Serum or Plasma 13 U/L     13 -

 60                          

[facility]                   









                    ID                  Date                Data Source

 

                    [id number]     2020 06:57:00 PM T [facility]









          Name      Value     Range     Interpretation Code Description Data Karlie

rce(s) Supporting 

Document(s)

 

          CBC W/AUTOMATED DIFF                                         [facility]  

 

                                            COMPLETE BLOOD COUNT 

 

           Leukocytes [#/volume] in Blood by Automated count 9.2 10^3/uL 4.2 - 1

1.0                       

[facility]                   

 

             Erythrocytes [#/volume] in Blood by Automated count 4.62 10^6/uL 4.

20 - 5.40                

                          [facility]     

 

           Hemoglobin [Mass/volume] in Blood 12.1 g/dL  12.0 - 16.0             

          [facility]                                 

 

           Hematocrit [Volume Fraction] of Blood by Automated count 37.7 %     3

7.0 - 47.0                       

[facility]                   

 

                    Erythrocyte mean corpuscular volume [Entitic volume] by Auto

mated count 81.6 fL             

81.0 - 101                                      [facility]  

 

                          Erythrocyte mean corpuscular hemoglobin [Entitic mass]

 by Automated count 26.2 

pg           27.0 - 34.0  L                         [facility]  

 

                                        Erythrocyte mean corpuscular hemoglobin 

concentration [Mass/volume] by Automated

 count     32.1 g/dL  31.0 - 36.0                       [facility]  

 

             Erythrocyte distribution width [Ratio] by Automated count 15.5 %   

    11.5 - 14.5  H             

                          [facility]     

 

           Platelets [#/volume] in Blood by Automated count 220 10^3/uL 150 - 45

0                        

[facility]                   

 

                    Platelet mean volume [Entitic volume] in Blood by Automated 

count 11.0 fL             7.4 - 

10.4            H                               [facility]  

 

           Neutrophils/100 leukocytes in Blood by Automated count 61.8 %     37.

0 - 80.0                       

[facility]                   

 

           Lymphocytes/100 leukocytes in Blood by Manual count 20.5 %     25.0 -

 40.0 L                     

[facility]                   

 

           Monocytes/100 leukocytes in Blood by Automated count 5.7 %      3.0 -

 8.0                        

[facility]                   

 

           Eosinophils/100 leukocytes in Blood by Automated count 11.0 %     0.0

 - 7.0  H                     

[facility]                   

 

           Basophils/100 leukocytes in Blood by Automated count 0.7 %      0.0 -

 2.5                        

[facility]                   

 

          %IG       0.3 %     0.0 - 0.0 H                   [facility]

al  

 

          %NRBC     0.0 %     0.0 - 0.0                     [facility]

al  

 

           Neutrophils [#/volume] in Blood by Automated count 5.69 10^3/uL 2.00 

- 6.90                       

[facility]                   

 

           Lymphocytes [#/volume] in Blood by Automated count 1.89 10^3/uL 0.60 

- 3.40                       

[facility]                   

 

           Monocytes [#/volume] in Blood by Automated count 0.52 10^3/uL 0.00 - 

0.90                       

[facility]                   

 

           Eosinophils [#/volume] in Blood by Automated count 1.01 10^3/uL 0.00 

- 0.70 H                     

[facility]                   

 

           Basophils [#/volume] in Blood by Automated count 0.06 10^3/uL 0.00 - 

0.20                       

[facility]                   

 

          #IG       0.03 10^3/uL 0.00 - 0.10                     [facility]

ospital  

 

          #NRBC     0.00 10^3/uL 0.00 - 0.00                     [facility]

ospital  

 

          MANUAL DIFF NOT INDICATED                               [facility]  

 

          RBC MORPH NOT INDICATED                               [facility]

spital  









                    ID                  Date                Data Source

 

                    [id number]     2020 09:15:00 AM EDT [facility] [address]

[place] [address] PHONE: [phone]

 FAX: [phone]  Name .................. : PAMELA MCKEON              Acct 
Number.................. : [id number] ROOM. ................. : TR-05             
            Number ................... : [id number] Stay type ............. : E/R 
                         Discharge Date......... ... : Admit Date ......... : 
20                        Admit Phys .................... : MARCIAL 
Date of Birth ....... : 1995                     Family Phys 
................... : UNKNOWN  Phone .................. : [phone]        
        Age ................................ : 24 Film# .................. 
.:[id number]                      Sex ................................. : F  
Unsigned transcriptions are preliminary reports and do not represent a medical 
or legal document         OB 1ST TRI UP TO 14 WEEKS 11985     
COMPLETE:20 22:46 [facility] [address]                                   Reason(s): 
Abd Pain     FIRST TRIMESTER OB ULTRASOUND:  INDICATION: Abdominal pain.  
FINDINGS: The uterus measures 4.3 x 5.8 x 7.9 cm.  There is an intrauterine 
gestational sac. Based on mean sac diameter, the estimated gestational age is 5 
weeks 4 days. No live pregnancy is visualized.  The bilateral ovaries are 
sonographically normal.  IMPRESSION: Intrauterine gestational sac visualized. 
There is no evidence of a live pregnancy at this time. This may be due to early 
pregnancy. Consider follow up beta hCG levels and follow up ultrasound for 
further evaluation. Transvaginal technique may be required.   
___________________________________ Electronically Reviewed and Signed By Edgardo Butler M.D.              , 20 09:15, [place]  Transcribe Initials: TELLY , 
Transcribe Date: 20 22:51, Dictation Date:   Copy for: MARCIAL COSTELLO    
              via fax Copy for: EMERGENCY DEPT                via modem Copy 
for: 710 MED REC DISCHARGED                                                     
                                             Page 1 of 1   









          Name      Value     Range     Interpretation Code Description Data Karlie

rce(s) Supporting 

Document(s)

 

                                                                       









                    ID                  Date                Data Source

 

                    [id number]      2020 09:32:00 PM EDT [facility]

 

                                                                                

                                        

                  1                                         OrderSheet          
                          [facility]                                
    Emergency Department                              [address] [place] [address]                                Phone #: [phone]- [phone]                                        2020 21:24                    
  ----------------------------------------------------                          
        Patient: ODILIA SANTIAGO                                MRN: [id number]    
  Acct#: [id number]                              Sex: F : 1995 Age: 
24yWEIGHT:72.5 kg (S) HEIGHT:66 inches (S) BMI:25.8ALLERGIES: No Known Drug 
AllergyCHIEF COMPLAINT: pelvic pain, abd painDIAGNOSIS: Discomfort of 
pregnancyLAB ORDERSOrder Description      Priority      Entered              
Acknowledged        InitialedCBC w Diff             STAT          22:
020                         22:37 Zo, April                                   
 Dwayne STAPLES;Chlamydia/GC           STAT          22:14 2020                      
   22:37 Zoss, April                                    Dwayne STAPLES;                    
  NOTES: urineCMP                    STAT         22:2020              
            22:37 Zo, April                                   Dwayne STAPLES;Culture, 
Urine         STAT         22:2020                          22:37 Zoss,
 April(Urine, Clean                      Dwayne Omer R.N.Catch)                             PA;Type Rh                STAT        
 22:2020                          22:37 Zo, April                    
               Dwayne STAPLES;Urinalysis (Clean      STAT         22:2020         
                 22:37 Zoss, AprilCatch)                             Dwayne STAPLES;HCG Serum Quant        STAT         22:2020                         
 22:37 Zo, April                                   Dwayne STAPLES;DIAGNOSTIC STUDY 
ORDERSOrder Description  Priority     Entered                   Acknowledged    
    InitialedUS OB 1ST TRI UP   STAT         22:14 2020                   
           22:37 Zo, AprilTO 14 WEEKS                    Dwayne Omer R.N.(Oxygen?(No))                  PA;(IV?(Yes))       
           Reason for Study: Abd Pain                                           
                                                        2                       
                   OrderSheet                                    [facility]                                    Emergency Department                
              [address] [place] [address]                              
  Phone #: [phone] ext- [phone]                                        
2020 21:24                      
----------------------------------------------------                            
      Patient: ODILIA SANTIAGO                                MRN: [id number]      
Acct#: [id number]                              Sex: F : 1995 Age: 
24yMEDICATION/IV/DRIP/FLUID ORDERSOrder Description    Priority   Entered       
              Acknowledged      InitialedIV NS : Bolus 1000              22:14 
2020                              22:59 Ryan,mL, then 150 mL/hr          
   Dwayne STAPLES;Zofran IVP 8 mg                 22:14 2020                  
            22:58 Dwayne Ryan R.N.;GENERAL 
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                                       2020 21:24                     
----------------------------------------------------                            
     Patient: ODILIA SANTIAGO                               MRN: [id number]      
Acct#: [id number]                             Sex: F : 1995 Age: 24y22:58
 2020 Zofran (Ondansetron HCl) IVP 8 mg given over 2 minute(s) via site #
1. Allergies verifiedand confirmed 5 rights. IV patency established. IV site 
checked: no pain, redness, or swelling. IV flushedthoroughly pre- and post-
medication administration. IVP given by RN. Information reviewed with 
patientincluding reason for taking this medication, signs of allergic reaction 
and precautions. Verbalizesunderstanding. --22:58 20 Edgar Ryan R.N.22:58 2020 Started bag #1 1000 mL IV Fluids IV NS; bolus of 1000 mL 
wide open via site #1 via IVpump. Allergies verified and confirmed 5 rights. IV 
patency established. IV site checked: no pain, redness,or swelling. IV flushed 
thoroughly pre- and post-medication administration. Information reviewed 
withpatient including reason for taking this medication, signs of allergic 
reaction and precautions. Verbalizesunderstanding. --22:59 20 Edgar Ryan R.N.( warm blankets provided, pt feeling better, no vomiting since arrival, ivf
 infusing per pump, spouse atbedside. waiting on lab results.). --23:22 20 
Giuliana Perez R.N.23:24 20. BP: 109/64. MAP: 79. HR: 52. RR: 16. O2 
saturation: 100%. Temp: 97.8 F. Pain levelnow: 0/10. --23:26 20 Edgar Ryan R.N.Head of bed elevated 30 degrees. Reassessment acuity: LEVEL 
3.Rounding: Pain: assessed pain level. Position: states comfortable. Personal 
care / toileting: denies toiletingneeds and mouth care. Proximity of possessions
 / care items: call light within easy reach. Plug ins: assuredIV pump plugged 
in; checked status of equipment in use; located all cords, tubes, and lines to 
prevent fallhazard. Set expectations: advised patient of rounding protocol 
timing and asked if they needed anythingelse at this time. Overall patient 
status is improved- she states feels better.GI / : Abdomen soft and nontender.
 Bowel sounds within normal limits.SKIN: Skin is warm and dry. Skin color within
 normal limits. Two patient identifiers checked. Call lightplaced in reach. Side
 rails up x 2. Bed placed in lowest position. Brakes of bed on. --23:26 
Edgar carrizales R.N.23:28 2020 Zofran IVP Response: no adverse 
reaction pain is improving. Symptoms have improvedthe patient feels better. 
--23:28 20 Edgar Ryan R.N.00:2020 IV Fluids IV NS via IV site #
1 Response: no adverse reaction pain is improving.Symptoms have improved the 
patient feels better. --00:20 Edgar Ryan R.N.00:10 2020 Site #1
 removed upon discharge. Pressure dressing and bandaid applied. --00: 
Edgar Ryan R.N.00:10 2020 IV Fluids IV NS via IV site #1 Discontinued:
 completed upon discharge. Total amountinfused: 1000 mL. IV patency established.
 IV site checked: no pain, redness, or swelling. IV flushedthoroughly. --00:10 
7/8/20 Edagr Ryan R.N.                                                      
                                                     4                          
         Clinical Report - Nurses                                     [facility]                                     Emergency Department          
                     [address] [place] [address]                       
          Phone #: [phone] ext- [phone]                                     
    2020 21:24                       --------------
--------------------------------------                                   
Patient: ODILIA SANTIAGO                                 MRN: [id number]      
Acct#: [id number]                               Sex: F : 1995 Age: 
24yDISPOSITION / DISCHARGE No learning barriers present. Discharge instructions 
provided and reviewed with the patient. Reviewed warnings. Reviewed 
medication(s). Treatments reviewed. Reviewed referrals. Patient and spouse 
verbalized understanding. Written instructions provided in English. The patient 
was discharged by the physician assistant. She was discharged home and accompan
ied by spouse. She left ambulatory and via private vehicle. Spouse driving. 
Patient has no belongings. --00:11 20 Edgar Ryan R.N. 00:10 20. 
BP: 113/72. MAP: 85. HR: 60. RR: 16. O2 saturation: 100%. Temp: 98 F. Pain level
 now: 0/10. --00:11 20 Edgar Ryan R.N. Departure time: 00:11 2020.
 --00:11 20 Edgar Ryan R.N.Locked/Released at 2020 00:11 by Edgar Ryan R.N.  









          Name      Value     Range     Interpretation Code Description Data Karlie

rce(s) Supporting 

Document(s)

 

                                                                       









                    ID                  Date                Data Source

 

                    [id number]      2020 09:32:00 PM EDT [facility]

 

                                                                                

                                        

                         1                           Clinical Report - 
Physicians/Mid Levels                                       [facility]                                       Emergency Department             
                    [address] [place] [address]                        
           Phone #: [phone] ext- [phone]                                    
       2020 21:24                         
----------------------------------------------------                            
         Patient: ODILIA SANTIAGO                                   MRN: [id number]
      Acct#: [id number]                                 Sex: F : 1995 
Age: 24y Time Seen: 22:00 2020. Arrived- By private vehicle. Historian- 
patient and family.HISTORY OF PRESENT ILLNESS Chief Complaint: PELVIC PAIN. 
ABDOMINAL PAIN Nausea and vomiting. This started today. She has had pelvic pain.
 Is still present. It was abrupt in onset. Last normal menstrual period- May 25.
 EDC is March days. Gestational age is 6 weeks. (pt present with n/v today, 
states early in pregnancy. pt c/o dizziness with standing.). Similar symptoms 
previously. Patient has had similar symptoms several times. Recent medical care:
 Not recently seen/assessed.REVIEW OF SYSTEMSThe patient has had nausea. She has
 had vomiting. No diarrhea, black stools, bloody stools, headacheor double 
vision. No fainting episodes, fever, eye discomfort, eye discharge or sore 
throat. No cough,difficulty breathing, chest pain, skin rash or chills. No joint
 pain.PAST HISTORYG 4; P 2; Ab 1. Problems: Pregnant. Psoriasis. Additional 
Surgeries: . Elbow wash out. Medications: None. Allergies: No Known 
Drug Allergy.SOCIAL HISTORYNever smoker. No alcohol use or drug use.PHYSICAL 
EXAMVital Signs: 2020 21:24 BP: 124/74. MAP: 90. HR: 80. RR: 16. O2 
saturation: 100%. Temp: 98.7 F.                                                 
                                                             2                  
           Clinical Report - Physicians/Mid Levels                              
          [facility]                                        
Emergency Department                                  [address] [place] [address]                                    Phone #: [phone] 
ext- [phone]                                            2020 21:24           
               ----------------------------------------------------             
                         Patient: ODILIA SANTIAGO                              
      MRN: [id number]      Acct#: [id number]                                  Sex: F 
: 1995 Age: 24y Pain level now: 0/10. Have been reviewed as normal. 
Oxygen saturation normal. Appearance: Alert. Oriented X3. No acute distress. 
HEENT: Normal external inspection. ENT: Pharynx normal. Neck: Neck supple. CVS: 
Heart sounds normal. Respiratory: No respiratory distress. Breath sounds normal.
 Chest nontender. Abdomen: Soft. Mild tenderness diffusely. Bowel sounds normal.
 No mass. Back: Normal external inspection. Skin: Skin warm and dry. Normal skin
 color. No rash. Normal skin turgor. Extremities: Extremities nontender. No 
pathologic edema. Neuro: Oriented X 3.LABS, X-RAYS, AND EKGLaboratory Tests: 
Laboratory tests have been ordered, with results reviewed and considered in 
themedical decision making process. CBC w Diff:       (GUERITA: 2020 22:55)  
        ( MsgRcvd 2020 23:00) Final results     **Test**                  
                 **Result**      **Flag**    **Units**     **(Reference)**     C
BC W/AUTOMATED DIFF         COMPLETE BLOOD COUNT     WBC                        
                7.7                         10/uL         (4.2 - 11.0)     RBC  
                                      5.04                        10/uL         
(4.20 - 5.40)     HEMOGLOBIN                                 13.2               
         g/dL            (12.0 - 16.0)     HEMATOCRIT                           
      41.0                        %               (37.0 - 47.0)     MCV         
                               81.3                        fL              (81.0
 - 101)     MCH                                        26.2            L        
   pg              (27.0 - 34.0)     MCHC                                       
32.2                        g/dL            (31.0 - 36.0)     RDW               
                         15.4            H           %               (11.5 - 
14.5)     PLATELETS                                  245                        
 10/uL         (150 - 450)     MPV                                        11.3  
          H           fL              (7.4 - 10.4)     NEUT                     
                  56.1                        %               (37.0 - 80.0)     
LYMPH                                      34.0                        %        
       (25.0 - 40.0)     MONO                                       5.8         
                %               (3.0 - 8.0)     EOS                             
           3.0                         %               (0.0 - 7.0)     BASO     
                                  1.0                         %               
(0.0 - 2.5)     %IG                                        0.1             H    
       %               (0.0 - 0.0)     %NRBC                                    
  0.0                         %               (0.0 - 0.0)     #NEUT             
                         4.34                        10/uL         (2.00 - 6.90)
     #LYMPH                                     2.63                        
10/uL         (0.60 - 3.40)     #MONO                                      0.45 
                       10/uL         (0.00 - 0.90)     #EOS                     
                  0.23                        10/uL         (0.00 - 0.70)     
#BASO                                      0.08                        10/uL    
     (0.00 - 0.20)     #IG                                        0.01          
              10/uL         (0.00 - 0.10)     #NRBC                             
         0.00                        10/uL         (0.00 - 0.00)     MANUAL DIFF
                                NOT INDICATED     RBC MORPH                     
             NOT INDICATED CMP:    (GUERITA: 2020 22:55)             ( 
MsgRcvd 2020 23:17) Final results     **Test**                            
       **Result**      **Flag**    **Units**     **(Reference)**     
COMPREHENSIVE METABOLIC PANEL         COMPREHENSIVE METABOLIC PANEL             
                                                                                
                   3                         Clinical Report - Physicians/Mid 
Levels                                     [facility]               
                      Emergency Department                               [address] [place] [address]                                 Phone #: [phone]                                         2020 21:24     
                  ----------------------------------------------------          
                         Patient: ODILIA SANTIAGO                              
   MRN: [id number]      Acct#: [id number]                               Sex: F : 
1995 Age: 24y SODIUM                                    136               
           mEq/L             (134 - 153) POTASSIUM                              
   3.7                          mEq/L             (3.6 - 5.0) CHLORIDE          
                        103                          mEq/L             (98 - 
107) CO2                                       22                           ME
Q/L             (22 - 30) GLUCOSE                                   99          
                 MG/DL             (65 - 110) BUN                               
        6                L           MG/DL             (7 - 21) CREATININE      
                          0.6              L           MG/DL             (0.7 - 
1.5) BUN/CREAT                                 10                               
              (8 - 27) TOTAL PROTEIN                             7.1            
              G/DL              (6.3 - 8.2) ALBUMIN                             
      4.6                          G/DL              (3.9 - 5.0) GLOBULIN       
                           2.5                          GM/DL             (2.4 -
 3.2) A/G RATIO                                 1.8                             
               (0.8 - 2.0) CALCIUM                                   9.4        
                  MG/DL             (8.4 - 10.2) TOTAL BILI                     
           <0.7                         MG/DL             (0.2 - 1.3) ALKALINE 
PHOS                             57                           U/L               
(38 - 126) SGOT/AST                                  18                         
  U/L               (5 - 40) SGPT/ALT                                  17       
                    U/L               (7 - 56) ANION GAP                        
         11.0                         mmol/L            (8.0 - 16.0) AGE        
                               24                           yrs NON-AA GFR      
                          >60                          mL/min AFR AMER GFR      
                        >60                          mL/min                     
           Male GFR Interprentation                   20-49 yrs       >60 mL/min
    Rosupg06-43 yrs     >56 mL/min   Normal                   60-69 yrs     >49 
mL/min    Normal                    70-79yrs>42 mL/min   Normal                 
  80 and above >35 mL/min     Normal                      Female 
GFRInterpretation                   20-39 yrs      >60 mL/min   Normal          
          40-49 yrs      >58 mL/minNormal                   50-59 yrs     >51 
mL/min    Normal                   60-69 yrs      >45 mL/min    Ijiozq83-23 yrs 
    >39 mL/min    Normal                   80 and above >32 mL/min     
NormalType Rh:      (GUERITA: 2020 22:55)             ( [facility] 2020 
23:23) Final results **Test**                                    **Result**     
**Flag**     **Units**      **(Reference)** ABO GROUP                           
        A RH TYPE                                     POSITIVE {      ABO/RH 
REENTER          A POSITIVEUrinalysis:     (GUERITA: 2020 22:21)           ( 
[facility] 2020 22:38) Final results **Test**                                
    **Result**     **Flag**     **Units**      **(Reference)** URINALYSIS     
URINALYSIS SOURCE                                      R  COLOR                 
                     yellow                                      (NORMAL: Yello 
 CLARITY                                    hazy                                
        (NORMAL: Clear  SPEC GRAVITY                               1.020        
                               (1.001 - 1.030  pH                               
          6                                           (5 - 9)  GLUCOSE          
                          NORM                                        (NORMAL: 
Negat  BILIRUBIN                                  NEG                           
              (NORMAL: Negat  KETONE                                     50     
        A                            (NORMAL: Negat  PROTEIN                    
                NEG                                         (NORMAL: Negat  
NITRITE                                    NEG                                  
       (NORMAL: Negat  BLOOD                                      NEG           
                              (NORMAL: Negat  LEUK EST                          
         NEG                                         (NORMAL: Negat  
UROBILINOGEN                               NOR                                  
       (less than 1.0  MICROSCOPIC                                Not 
IndicateBeta-HCG, Quant Serum:     (GUERITA: 2020 22:55)               ( 
[facility] 2020 23:31) Final results **Test**                                
    **Result**     **Flag**     **Units**      **(Reference)** HCG QUANT        
                           25091.0                     mIU/mL                   
                                                                                
            4                       Clinical Report - Physicians/Mid Levels     
                             [facility]                             
     Emergency Department                            [address] [place] [address]                              Phone #: [phone] ext- [phone]        
                              2020 21:24                    
----------------------------------------------------                            
    Patient: ODILIA SANTIAGO                              MRN: [id number]      
Acct#: [id number]                            Sex: F : 1995 Age: 24y      
                             Interpretation:                            Less 
than 5 mU/mL: Negative6-10 mU/mL: Borderline (suggest repeat in 48 hours)       
                           >10: PositiveApprox HCG range (mU/mL) Weeks post LMP 
                           5.4-708 mU/mL 3-4 Jrbny461-52646 mU/mL 5-6 Weeks     
                       4059-947076 mU/mL 7-8 Evuru25829-196107 mU/mL 9-10 Weeks 
                           02732-91857 mU/mL 12-14 Xjvci40262-67051 mU/mL 15-16 
Weeks                            8240-78535 mU/mL 17-18 WeeksUS OB 1ST TRI UP TO
 14 WEEKS:      (GUERITA: 2020 22:14)               ( MsgRcvd 2020 
22:53) In ProgressUS OB 1ST TRI UP TO 14 WEEKSReason(s): Abd PainTRANSPORTATION:
 WC                       IV?   IV?(Yes)   O2?   Oxygen?(No)     Ro **Exam** US 
OB 1ST TRI UP TO 14 WEEKS   [facility]   [address] [place] [address]   PHONE: [phone] FAX: [phone]   Name 
.................. : PAMELA MCKEON                  Acct 
Number.................. : [id number]   ROOM. ................. : TR-05           
                  Number ................... : [id number]   Stay type 
............. : E/R                              Discharge Date......... ... :  
 Admit Date ......... : 20                            Admit Phys ......
.............. : MARCIAL   Date of Birth ....... : 1995                
         Family Phys ................... : UNKNOWN    Phone ..................
 : [phone]                    Age ................................ : 24   
Film# .................. .:[id number]                          Sex 
................................. : F   Unsigned transcriptions are preliminary 
reports and do not represent a medical or legal document           OB 1ST TRI 
UP TO 14 WEEKS [id number]     COMPLETE:20 22:46 [facility] [address]                      
               Reason(s): Abd Pain   FIRST TRIMESTER OB ULTRASOUND:   
INDICATION: Abdominal pain.   FINDINGS:   The uterus measures 4.3 x 5.8 x 7.9 
cm.   There is an intrauterine gestational sac. Based on mean sac diameter, the 
estimated gestational   age is 5 weeks 4 days. No live pregnancy is visualized. 
  The bilateral ovaries are sonographically normal.   IMPRESSION:   Intrauterine
 gestational sac visualized. There is no evidence of a live pregnancy at this ti
me. This   may be due to early pregnancy. Consider follow up beta hCG levels and
 follow up ultrasound for   further evaluation. Transvaginal technique may be 
required.   ___________________________________   Electronically Reviewed and 
Signed By   DCTNAME , SIGNDATE, [facility]   Transcribe Initials: TELLY , Transcribe Date:
 20 22:51, Dictation Date:   <<REPDIST>>                                  
                                                                              5 
                            Clinical Report - Physicians/Mid Levels             
                            [facility]                              
           Emergency Department                                   [address] [place] [address]                                     Phone #: [phone] [phone]                                             2020 21:24 
                          ----------------------------------------------------  
                                     Patient: ODILIA SANTIAGO                  
                   MRN: [id number]      Acct#: [id number]                             
      Sex: F : 1995 Age: 24y                                           
                                                        Page 1 of 1 . Note - 
Tests: (OB US- Intrauterine gestational sac visualized. There is no evidence of 
a live pregnancy at this time. 5W4D. This   may be due to early pregnancy. 
Consider follow up beta hCG levels and follow up ultrasound for   further 
evaluation.).PROGRESS AND PROCEDURESCourse of Care: 23:55 2020. Evaluat
ion after observation. (Discussed risks, benefits, options andpt is agreeable 
with dx and tx plan.). Patient and spouse counseled in person regarding the 
patient's stable condition, test results, diagnosis and need for follow-up. 
Patient and spouse agrees with plan of care. 23:55 2020. Disposition: 
Discharged home in good and improved condition (23:56 2020).CLINICAL 
IMPRESSION First trimester discomforts of pregnancy- nausea and vomiting. 
Positive pregnancy test in the emergency department.INSTRUCTIONS Drink plenty of
 fluids. Warnings: GENERAL WARNINGS: Return or contact your physician 
immediately if your condition worsens or changes unexpectedly, if not improving 
as expected, or if other problems arise. Specifically return if pain, bleeding 
or fever. Your Current Medications: . No home medication. Follow-up: Follow up 
with doctor OB. Understanding of the discharge instructions verbalized by 
patient. [facility] with: [facility] Respective Team Kisha Analialma LIM, , 
[phone], [address] [address]                                                         
                                                 6                         
Clinical Report - Physicians/Mid Levels                                     
[facility]                                     Emergency Department 
                              [address] [place] [address]              
                   Phone #: [phone] ext- [phone]                            
             2020 21:24                       
----------------------------------------------------                            
       Patient: ODILIA SANTIAGO                                 MRN: [id number]    
  Acct#: [id number]                               Sex: F : 1995 Age: 24y 
[address] [place], , [place], [address]   Follow up. Call for an 
appointment. Reason for referral: evaluation and treatment. Summary of care 
provided to patient.(Electronically signed by JHOAN Villagomez 2020 
06:58)  









          Name      Value     Range     Interpretation Code Description Data Karlie

rce(s) Supporting 

Document(s)

 

                                                                       









                    ID                  Date                Data Source

 

                    [id number]     2020 11:31:00 PM EDT [facility]









          Name      Value     Range     Interpretation Code Description Data Karlie

rce(s) Supporting 

Document(s)

 

             Choriogonadotropin.intact [Units/volume] in Serum or Plasma 35013.0

 mIU/mL                            

                          [facility]     

 

                                                                         Interpr

etation:                           Less 

than 5 mU/mL: Negative                 6-10 mU/mL: Borderline (suggest repeat in
 48 hours)                                 >10: Positive                     
Approx HCG range (mU/mL) Weeks post LMP                           5.4-708 mU/mL 
 3-4 Weeks                           217-12975 mU/mL 5-6 Weeks                  
         4059-588339 mU/mL 7-8 Weeks                           58263-197032 
mU/mL 9-10 Weeks                           56112-48736 mU/mL 12-14 Weeks        
                   18914-21142 mU/mL 15-16 Weeks                           8240-
77599 mU/mL 17-18 Weeks 









                    ID                  Date                Data Source

 

                    [id number]     2020 11:23:00 PM EDT [facility]









          Name      Value     Range     Interpretation Code Description Data Karlie

rce(s) Supporting 

Document(s)

 

          ABO group [Type] in Blood A                                       [facility]  

 

          Rh [Type] in Blood POSITIVE                                [facility]  

 

                                        {      ABO/RH REENTER        A POSITIVE 









                    ID                  Date                Data Source

 

                    [id number]     2020 11:17:00 PM EDT [facility]









          Name      Value     Range     Interpretation Code Description Data Karlie

rce(s) Supporting 

Document(s)

 

          COMPREHENSIVE METABOLIC PANEL                                         

[facility]  

 

                                            COMPREHENSIVE METABOLIC PANEL 

 

           Sodium [Moles/volume] in Serum or Plasma 136 mEq/L  134 - 153        

                [facility]                                 

 

           Potassium [Moles/volume] in Serum or Plasma 3.7 mEq/L  3.6 - 5.0     

                   [facility]                            

 

           Chloride [Moles/volume] in Serum or Plasma 103 mEq/L  98 - 107       

                  [facility]                                 

 

           Carbon dioxide, total [Moles/volume] in Serum or Plasma 22 MEQ/L   22

 - 30                          

[facility]                   

 

           Glucose [Mass/volume] in Serum or Plasma 99 MG/DL   65 - 110         

                [facility]                                 

 

          BUN       6 MG/DL   7 - 21    L                   [facility]  

 

           Creatinine [Mass/volume] in Serum or Plasma 0.6 MG/DL  0.7 - 1.5  L  

                   [facility]                            

 

          BUN/CREAT 10        8 - 27                        [facility]  

 

           Protein [Mass/volume] in Serum or Plasma 7.1 G/DL   6.3 - 8.2        

                [facility]                                 

 

           Albumin [Mass/volume] in Serum or Plasma 4.6 G/DL   3.9 - 5.0        

                [facility]                                 

 

           Globulin [Mass/volume] in Serum by calculation 2.5 GM/DL  2.4 - 3.2  

                      [facility]                            

 

          A/G RATIO 1.8       0.8 - 2.0                     [facility]

al  

 

           Calcium [Mass/volume] in Serum or Plasma 9.4 MG/DL  8.4 - 10.2       

                [facility]                                 

 

           Bilirubin.total [Mass/volume] in Serum or Plasma <0.7 MG/DL 0.2 - 1.3

                        

[facility]                   

 

                    Alkaline phosphatase [Enzymatic activity/volume] in Serum or

 Plasma 57 U/L              38 - 

126                                             [facility]  

 

                          Aspartate aminotransferase [Enzymatic activity/volume]

 in Serum or Plasma 18 U/L

             5 - 40                                 [facility]  

 

                    Alanine aminotransferase [Enzymatic activity/volume] in Seru

m or Plasma 17 U/L              7

- 56                                            [facility]  

 

           Anion gap 3 in Serum or Plasma 11.0 mmol/L 8.0 - 16.0                

       [facility]

                                         

 

          AGE       24 yrs                                  [facility]

al  

 

          NON-AA GFR >60 mL/min                               [facility]

ital  

 

          AFR AMER GFR >60 mL/min                               [facility]

spital  

 

                                                                     Male GFR In

terprentation                  20-49 yrs

    >60 mL/min   Normal                  50-59 yrs     >56 mL/min   Normal      
           60-69 yrs     >49 mL/min   Normal                  70-79yrs      >42 
mL/min   Normal                  80 and above  >35 mL/min   Normal              
     Female GFR  Interpretation                  20-39 yrs     >60 mL/min   
Normal                  40-49 yrs     >58 mL/min   Normal                  50-59
yrs     >51 mL/min   Normal                  60-69 yrs     >45 mL/min   Normal  
               70-79 yrs     >39 mL/min   Normal                  80 and above  
>32 mL/min   Normal 









                    ID                  Date                Data Source

 

                    [id number]     2020 11:00:00 PM EDT [facility]









          Name      Value     Range     Interpretation Code Description Data Karlie

rce(s) Supporting 

Document(s)

 

          CBC W/AUTOMATED DIFF                                         [facility]  

 

                                            COMPLETE BLOOD COUNT 

 

           Leukocytes [#/volume] in Blood by Automated count 7.7 10^3/uL 4.2 - 1

1.0                       

[facility]                   

 

             Erythrocytes [#/volume] in Blood by Automated count 5.04 10^6/uL 4.

20 - 5.40                

                          [facility]     

 

           Hemoglobin [Mass/volume] in Blood 13.2 g/dL  12.0 - 16.0             

          [facility]                                 

 

           Hematocrit [Volume Fraction] of Blood by Automated count 41.0 %     3

7.0 - 47.0                       

[facility]                   

 

                    Erythrocyte mean corpuscular volume [Entitic volume] by Auto

mated count 81.3 fL             

81.0 - 101                                      [facility]  

 

                          Erythrocyte mean corpuscular hemoglobin [Entitic mass]

 by Automated count 26.2 

pg           27.0 - 34.0  L                         [facility]  

 

                                        Erythrocyte mean corpuscular hemoglobin 

concentration [Mass/volume] by Automated

 count     32.2 g/dL  31.0 - 36.0                       [facility]  

 

             Erythrocyte distribution width [Ratio] by Automated count 15.4 %   

    11.5 - 14.5  H             

                          [facility]     

 

           Platelets [#/volume] in Blood by Automated count 245 10^3/uL 150 - 45

0                        

[facility]                   

 

                    Platelet mean volume [Entitic volume] in Blood by Automated 

count 11.3 fL             7.4 - 

10.4            H                               [facility]  

 

           Neutrophils/100 leukocytes in Blood by Automated count 56.1 %     37.

0 - 80.0                       

[facility]                   

 

           Lymphocytes/100 leukocytes in Blood by Manual count 34.0 %     25.0 -

 40.0                       

[facility]                   

 

           Monocytes/100 leukocytes in Blood by Automated count 5.8 %      3.0 -

 8.0                        

[facility]                   

 

           Eosinophils/100 leukocytes in Blood by Automated count 3.0 %      0.0

 - 7.0                        

[facility]                   

 

           Basophils/100 leukocytes in Blood by Automated count 1.0 %      0.0 -

 2.5                        

[facility]                   

 

          %IG       0.1 %     0.0 - 0.0 H                   [facility]

[facility]  

 

          %NRBC     0.0 %     0.0 - 0.0                     [facility]

al  

 

           Neutrophils [#/volume] in Blood by Automated count 4.34 10^3/uL 2.00 

- 6.90                       

[facility]                   

 

           Lymphocytes [#/volume] in Blood by Automated count 2.63 10^3/uL 0.60 

- 3.40                       

[facility]                   

 

           Monocytes [#/volume] in Blood by Automated count 0.45 10^3/uL 0.00 - 

0.90                       

[facility]                   

 

           Eosinophils [#/volume] in Blood by Automated count 0.23 10^3/uL 0.00 

- 0.70                       

[facility]                   

 

           Basophils [#/volume] in Blood by Automated count 0.08 10^3/uL 0.00 - 

0.20                       

[facility]                   

 

          #IG       0.01 10^3/uL 0.00 - 0.10                     [facility]

ospital  

 

          #NRBC     0.00 10^3/uL 0.00 - 0.00                     [facility]

ospital  

 

          MANUAL DIFF NOT INDICATED                               [facility]  

 

          RBC MORPH NOT INDICATED                               [facility]

spital  









                    ID                  Date                Data Source

 

                    [id number]     2020 06:08:00 AM EDT [facility]









          Name      Value     Range     Interpretation Code Description Data Karlie

rce(s) Supporting 

Document(s)

 

                                        Chlamydia trachomatis rRNA [Presence] in

 Unspecified specimen by Probe and 

target amplification method Negative   Negative                         [facility]  

 

                                        Neisseria gonorrhoeae rRNA [Presence] in

 Unspecified specimen by Probe and 

target amplification method Negative   Negative                         [facility]  









                    ID                  Date                Data Source

 

                    [id number]     2020 11:33:00 AM EDT [facility]









          Name      Value     Range     Interpretation Code Description Data Karlie

rce(s) Supporting 

Document(s)

 

          CULTURE URINE                                         [facility]

spital  

 

                                            _CULTURE 

URINE_$$[id number]$$[id number]$$[id number]$$[id number]$$763055$$498751$$647409$$971630$$832229$$

[id number]$$043481$$637036$$963442$$019729$$776822$$279741$$519029$$806113$$195342$$

[id number]$$[id number]$$670606$$943461$$477865$$066928$$325823$$[id number]                  
       -- Continued on next page --Patient: PAMELA MCKEON                
Order: [id number]                   Page 2Culture: CULTURE URINE                     
                      Status: 
Final===========================================================================

====                         -- Continued on next page --Patient: PAMELA MCKEON                Order: [id number]                   Page 2Culture: CULTURE URINE    
                                      Status: 
Prelim==========================================================================

=====$$[id number]$$[id number] DATE/TIME: 2020 11:05Culture: CULTURE URINE 
                                          Status: FinalUrine 
Culture,Comprehensive:                                                  P1No 
growth in 36 - 48 hours.    **** Previous result entered on 07/10/2020 02:56 ET 
****No growth after 18-24 hours.P1 Test performed by: [facility] #: [id number]                      [address] [place] [address]           
           [id number]                      [facility] [id number]-
[id number] Director  :   Reyes Araceli B MD                       NPI #:Lab Di
larissa      :                                                                   
                 07/10/[email or web address].SENT REF                                     
                                               [id number].XMT.SENT REF 









                    ID                  Date                Data Source

 

                    [id number]     2020 10:38:00 PM EDT [facility]









          Name      Value     Range     Interpretation Code Description Data Karlie

rce(s) Supporting 

Document(s)

 

          URINALYSIS                                         [facility]

te  

 

                                            URINALYSIS 

 

          SOURCE    R                                       [facility]

al  

 

          COLOR     yellow    NORMAL: Yellow                     [facility]

ospital  

 

          CLARITY   hazy      NORMAL: Clear                     [facility]

spital  

 

           Specific gravity of Urine by Test strip 1.020      1.001 - 1.030     

                  [facility]                                 

 

          pH        6         5 - 9                         [facility]

al  

 

           Glucose [Mass/volume] in Urine by Test strip NORM       NORMAL: Negat

[facility]                            

 

           Bilirubin.total [Presence] in Urine by Test strip NEG        NORMAL: 

Negative                       

[facility]                   

 

           Ketones [Presence] in Urine by Test strip 50         NORMAL: Negative

 A                     [facility]                                 

 

           Protein [Mass/volume] in Urine by Test strip NEG        NORMAL: Negat

piedad                       [facility]                            

 

           Nitrite [Presence] in Urine by Test strip NEG        NORMAL: Negative

                       [facility]                                

 

          BLOOD     NEG       NORMAL: Negative                     [facility]  

 

           Leukocyte esterase [Presence] in Urine by Test strip NEG        CA[facility]

L: Negative                       

[facility]                   

 

           Urobilinogen [Mass/volume] in Urine by Test strip NOR        less radha

n 1.0 mg/dL                       

[facility]                   

 

          MICROSCOPIC Not Indicate                               [facility]

ospital  







                                        Procedure

 

                                          



                                                                                
                                                                                
                                                                                
                                                                                
                                                                                
                                                                                
                                                                                
                                                                                
                                                                                
                                                                                
                                                                                
                                                                                
                                                                                
                  



Vital Signs

          



                    ID                  Date                Data Source

 

                    [id number]            2021 02:38:49 PM EST [facility]









           Name       Value      Range      Interpretation Code Description Data

 Source(s)

 

           WEIGHT RECORDED 190.00 pounds                       190.00 pounds [facility]

 

           Height     66 Inches                        066 Inches [facility]









                    ID                  Date                Data Source

 

                    [id number]            2020 07:40:09 PM EDT [facility]









           Name       Value      Range      Interpretation Code Description Data

 Source(s)

 

           WEIGHT RECORDED 160.70 pounds                       160.70 pounds [facility]

 

           Height     66 Inches                        066 Inches [facility]

## 2021-02-13 VITALS — SYSTOLIC BLOOD PRESSURE: 116 MMHG | DIASTOLIC BLOOD PRESSURE: 55 MMHG

## 2021-02-13 VITALS — DIASTOLIC BLOOD PRESSURE: 51 MMHG | SYSTOLIC BLOOD PRESSURE: 102 MMHG

## 2021-02-13 VITALS — DIASTOLIC BLOOD PRESSURE: 74 MMHG | SYSTOLIC BLOOD PRESSURE: 130 MMHG

## 2021-02-13 VITALS — SYSTOLIC BLOOD PRESSURE: 131 MMHG | DIASTOLIC BLOOD PRESSURE: 61 MMHG

## 2021-02-13 VITALS — DIASTOLIC BLOOD PRESSURE: 66 MMHG | SYSTOLIC BLOOD PRESSURE: 124 MMHG

## 2021-02-13 VITALS — SYSTOLIC BLOOD PRESSURE: 128 MMHG | DIASTOLIC BLOOD PRESSURE: 63 MMHG

## 2021-02-13 LAB
HCT VFR BLD AUTO: 28.4 % (ref 36–47)
HGB BLD-MCNC: 8.3 G/DL (ref 12–15.5)
MCH RBC QN AUTO: 22.3 PG (ref 27–33)
MCHC RBC AUTO-ENTMCNC: 29.2 G/DL (ref 32–36.5)
MCV RBC AUTO: 76.1 FL (ref 80–96)
PLATELET # BLD AUTO: 231 10^3/UL (ref 150–450)
RBC # BLD AUTO: 3.73 10^6/UL (ref 4–5.4)
WBC # BLD AUTO: 12.7 10^3/UL (ref 4–10)

## 2021-02-13 RX ADMIN — KETOROLAC TROMETHAMINE SCH MG: 30 INJECTION, SOLUTION INTRAMUSCULAR at 03:51

## 2021-02-13 RX ADMIN — IBUPROFEN SCH MG: 800 TABLET, FILM COATED ORAL at 18:19

## 2021-02-13 RX ADMIN — IBUPROFEN SCH MG: 800 TABLET, FILM COATED ORAL at 10:12

## 2021-02-13 RX ADMIN — Medication SCH TAB: at 09:00

## 2021-02-13 NOTE — IPNPDOC
Text Note


Date of Service


The patient was seen on 2/13/21.





NOTE











Item Value  Date Time


 


White Blood Count 12.7 10^3/uL H 2/13/21 0635


 


Red Blood Count 3.73 10^6/uL L 2/13/21 0635


 


Hemoglobin 8.3 g/dl L 2/13/21 0635


 


Hematocrit 28.4 % L 2/13/21 0635


 


Mean Corpuscular Volume 76.1 fl L 2/13/21 0635


 


Mean Corpuscular Hemoglobin 22.3 pg L 2/13/21 0635


 


Mean Corpuscular Hemoglobin Concent 29.2 g/dl L 2/13/21 0635


 


Red Cell Distribution Width 15.0 % H 2/13/21 0635


 


Platelet Count 231 10^3/uL 2/13/21 0635


 


Nucleated Red Blood Cells % (auto) 0.0 % 2/13/21 0635


 


White Blood Count 12.6 10^3/uL H 2/12/21 0550


 


Red Blood Count 4.31 10^6/uL 2/12/21 0550


 


Hemoglobin 9.4 g/dl L 2/12/21 0550


 


Hematocrit 32.1 % L 2/12/21 0550


 


Mean Corpuscular Volume 74.5 fl L 2/12/21 0550


 


Mean Corpuscular Hemoglobin 21.8 pg L 2/12/21 0550


 


Mean Corpuscular Hemoglobin Concent 29.3 g/dl L 2/12/21 0550


 


Red Cell Distribution Width 14.9 % H 2/12/21 0550


 


Platelet Count 276 10^3/uL 2/12/21 0550


 


Nucleated Red Blood Cells % (auto) 0.0 % 2/12/21 0550








02/13/21  0700 POST OP DAY 1 FOR REPEAT CS X 3 AND CHOLESTASIS IN PREGNANCY. 

POST PARTUM UNABLE TO VOID SCANNED 600 ML BARNES REPLACED NOW 0700 REMOVED. 

FEELING WELL MOBILIZING BREAST FEEDING . PLAN TO VOID  HOPEFULLY DISCHARGE 

TOMORROW





Manoj NEWBERRY, I+O


VSManoj I+O


Laboratory Tests


2/13/21 06:35











Vital Signs








  Date Time  Temp Pulse Resp B/P (MAP) Pulse Ox O2 Delivery O2 Flow Rate FiO2


 


2/13/21 06:00 97.4 45 18 116/55 (75) 99 Room Air  














I&O- Last 24 Hours up to 6 AM 


 


 2/13/21





 06:00


 


Intake Total 2405 ml


 


Output Total 1900 ml


 


Balance 505 ml

















Wil Lindsay MD             Feb 13, 2021 07:51

## 2021-02-14 VITALS — SYSTOLIC BLOOD PRESSURE: 119 MMHG | DIASTOLIC BLOOD PRESSURE: 65 MMHG

## 2021-02-14 VITALS — SYSTOLIC BLOOD PRESSURE: 122 MMHG | DIASTOLIC BLOOD PRESSURE: 64 MMHG

## 2021-02-14 VITALS — DIASTOLIC BLOOD PRESSURE: 74 MMHG | SYSTOLIC BLOOD PRESSURE: 132 MMHG

## 2021-02-14 RX ADMIN — Medication SCH TAB: at 08:03

## 2021-02-14 RX ADMIN — IBUPROFEN SCH MG: 800 TABLET, FILM COATED ORAL at 10:43

## 2021-02-14 RX ADMIN — IBUPROFEN SCH MG: 800 TABLET, FILM COATED ORAL at 03:35

## 2021-02-14 NOTE — IPNPDOC
Postpartum Progress Note


Date of Service:  2021


Postpartum Day#:  2


Postpartum Progress Note


SUBJECT: Harley is a 25 -year-old  4 now Para 2-1-1-3  POD2 S/P RCD at 36-

6/7 weeks of 7 pounds 0 ounces, 3170 gm, Apgars of 8 and 9 at 1 and 5 minute 

respectively, cord x1 around the neck and around the ankle. Arterial pH 7.27,

base excess -4.5, venous pH 7.33, base excess -3.3. 





She has been ambulating, voiding spontaneously without issue and tolerating 

regular diet. Breast feeding without issue. Reports lochia is like a normal 

period. 





OBJECTIVE: 


VITAL SIGNS: Within normal limits, afebrile.


Alert and oriented times three.


normal work of breathing.


Heart rate: Regular rate and rhythm


Abdomen: Fundus firm at U-2. 





ASSESSMENT: Harley is a 25 -year-old  4 now Para 2-1-1-3  POD2 S/P RCD at 

36-6/7 weeks of 7 pounds 0 ounces, 3170 gm, Apgars of 8 and 9 at 1 and 5 minute 

respectively, cord x1 around the neck and around the ankle. Arterial pH 7.27,

base excess -4.5, venous pH 7.33, base excess -3.3. Vitals within normal limits,

afebrile, hemodynamically stable with no evidence of infection.





PLAN:


1. Discharge to home today.


2. Has pain meds at home.


3. Encourage breast feeding and ambulation.


4. Wants the patch  for contraception- start after 4 weeks


5. Routine PP visit in 2 and 6 weeks in clinic.


6. Discussed return precautions at length.





VS, I&O, 24H, Fishbone


Vital Signs/I&O





Vital Signs








  Date Time  Temp Pulse Resp B/P (MAP) Pulse Ox O2 Delivery O2 Flow Rate FiO2


 


21 06:00 98.4 76 18 132/74 (93) 99 Room Air  














I&O- Last 24 Hours up to 6 AM 


 


 21





 06:00


 


Output Total 675 ml


 


Balance -675 ml

















VALERIE DAO MD           2021 09:55